# Patient Record
Sex: MALE | Race: BLACK OR AFRICAN AMERICAN | Employment: OTHER | URBAN - METROPOLITAN AREA
[De-identification: names, ages, dates, MRNs, and addresses within clinical notes are randomized per-mention and may not be internally consistent; named-entity substitution may affect disease eponyms.]

---

## 2017-12-05 ENCOUNTER — HOSPITAL ENCOUNTER (EMERGENCY)
Age: 77
Discharge: HOME | End: 2017-12-05
Attending: EMERGENCY MEDICINE
Payer: COMMERCIAL

## 2017-12-05 VITALS
RESPIRATION RATE: 16 BRPM | OXYGEN SATURATION: 98 % | HEIGHT: 69 IN | WEIGHT: 172 LBS | SYSTOLIC BLOOD PRESSURE: 157 MMHG | TEMPERATURE: 98 F | HEART RATE: 90 BPM | DIASTOLIC BLOOD PRESSURE: 75 MMHG | BODY MASS INDEX: 25.48 KG/M2
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
## 2017-12-05 DIAGNOSIS — S39.012A LOW BACK STRAIN, INITIAL ENCOUNTER: Primary | ICD-10-CM

## 2017-12-05 PROCEDURE — 99283 EMERGENCY DEPT VISIT LOW MDM: CPT

## 2017-12-05 RX ORDER — LISINOPRIL 20 MG/1
TABLET ORAL DAILY
Status: ON HOLD | COMMUNITY

## 2017-12-05 RX ORDER — ASPIRIN 81 MG/1
TABLET ORAL DAILY
Status: ON HOLD | COMMUNITY

## 2017-12-05 RX ORDER — METFORMIN HYDROCHLORIDE 1000 MG/1
1000 TABLET ORAL 2 TIMES DAILY WITH MEALS
Status: ON HOLD | COMMUNITY

## 2017-12-05 RX ORDER — SIMVASTATIN 20 MG/1
TABLET, FILM COATED ORAL
Status: ON HOLD | COMMUNITY
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
## 2017-12-05 NOTE — ED NOTES
Pt discharged home stable and ambulatory. Pain level at discharge 0  . Pt discharged with son at bedside  Reviewed discharged instructions with verbalized understanding.    Patient armband removed and shredded
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
## 2017-12-05 NOTE — LETTER
NOTIFICATION RETURN TO WORK / SCHOOL 
 
12/5/2017 11:38 AM 
 
Mr. Terence Kemp [address] [address] To Whom It May Concern: 
 
Terence Kemp is currently under the care of [facility] EMERGENCY DEPT. He will return to work/school on: 12/7/17 If there are questions or concerns please have the patient contact our office.  
 
 
 
Sincerely,

## 2017-12-05 NOTE — DISCHARGE INSTRUCTIONS
Back Strain: Care Instructions  Your Care Instructions    Back strain happens when you overstretch, or pull, a muscle in your back. You may hurt your back in an accident or when you exercise or lift something. Most back pain will get better with rest and time. You can take care of yourself at home to help your back heal.  Follow-up care is a key part of your treatment and safety. Be sure to make and go to all appointments, and call your doctor if you are having problems. It's also a good idea to know your test results and keep a list of the medicines you take. How can you care for yourself at home? · Try to stay as active as you can, but stop or reduce any activity that causes pain. · Put ice or a cold pack on the sore muscle for 10 to 20 minutes at a time to stop swelling. Try this every 1 to 2 hours for 3 days (when you are awake) or until the swelling goes down. Put a thin cloth between the ice pack and your skin. · After 2 or 3 days, apply a heating pad on low or a warm cloth to your back. Some doctors suggest that you go back and forth between hot and cold treatments. · Take pain medicines exactly as directed. ¨ If the doctor gave you a prescription medicine for pain, take it as prescribed. ¨ If you are not taking a prescription pain medicine, ask your doctor if you can take an over-the-counter medicine. · Try sleeping on your side with a pillow between your legs. Or put a pillow under your knees when you lie on your back. These measures can ease pain in your lower back. · Return to your usual level of activity slowly. When should you call for help? Call 911 anytime you think you may need emergency care. For example, call if:  ? · You are unable to move a leg at all. ?Call your doctor now or seek immediate medical care if:  ? · You have new or worse symptoms in your legs, belly, or buttocks. Symptoms may include:  ¨ Numbness or tingling. ¨ Weakness. ¨ Pain.    ? · You lose bladder or bowel control. ? Watch closely for changes in your health, and be sure to contact your doctor if you are not getting better as expected. Where can you learn more? Go to http://eric-lana.info/. Enter C815 in the search box to learn more about \"Back Strain: Care Instructions. \"  Current as of: March 21, 2017  Content Version: 11.4  © 3345-7075 [employer]. Care instructions adapted under license by [employer] (which disclaims liability or warranty for this information). If you have questions about a medical condition or this instruction, always ask your healthcare professional. [place] [address] any warranty or liability for your use of this information.

## 2022-01-01 ENCOUNTER — ANESTHESIA (OUTPATIENT)
Dept: ICU | Age: 82
DRG: 871 | End: 2022-01-01
Payer: MEDICARE

## 2022-01-01 ENCOUNTER — APPOINTMENT (OUTPATIENT)
Dept: GENERAL RADIOLOGY | Age: 82
DRG: 871 | End: 2022-01-01
Attending: INTERNAL MEDICINE
Payer: MEDICARE

## 2022-01-01 ENCOUNTER — APPOINTMENT (OUTPATIENT)
Dept: VASCULAR SURGERY | Age: 82
DRG: 871 | End: 2022-01-01
Attending: INTERNAL MEDICINE
Payer: MEDICARE

## 2022-01-01 ENCOUNTER — APPOINTMENT (OUTPATIENT)
Dept: CT IMAGING | Age: 82
DRG: 871 | End: 2022-01-01
Attending: INTERNAL MEDICINE
Payer: MEDICARE

## 2022-01-01 ENCOUNTER — APPOINTMENT (OUTPATIENT)
Dept: GENERAL RADIOLOGY | Age: 82
DRG: 871 | End: 2022-01-01
Attending: EMERGENCY MEDICINE
Payer: MEDICARE

## 2022-01-01 ENCOUNTER — APPOINTMENT (OUTPATIENT)
Dept: NUCLEAR MEDICINE | Age: 82
DRG: 871 | End: 2022-01-01
Attending: INTERNAL MEDICINE
Payer: MEDICARE

## 2022-01-01 ENCOUNTER — APPOINTMENT (OUTPATIENT)
Dept: NON INVASIVE DIAGNOSTICS | Age: 82
DRG: 871 | End: 2022-01-01
Attending: INTERNAL MEDICINE
Payer: MEDICARE

## 2022-01-01 ENCOUNTER — ANESTHESIA EVENT (OUTPATIENT)
Dept: ICU | Age: 82
DRG: 871 | End: 2022-01-01
Payer: MEDICARE

## 2022-01-01 ENCOUNTER — HOSPITAL ENCOUNTER (INPATIENT)
Age: 82
LOS: 5 days | DRG: 871 | End: 2022-11-07
Attending: EMERGENCY MEDICINE | Admitting: INTERNAL MEDICINE
Payer: MEDICARE

## 2022-01-01 ENCOUNTER — APPOINTMENT (OUTPATIENT)
Dept: GENERAL RADIOLOGY | Age: 82
DRG: 871 | End: 2022-01-01
Attending: FAMILY MEDICINE
Payer: MEDICARE

## 2022-01-01 VITALS
BODY MASS INDEX: 22.53 KG/M2 | WEIGHT: 143.52 LBS | SYSTOLIC BLOOD PRESSURE: 120 MMHG | TEMPERATURE: 97.3 F | OXYGEN SATURATION: 42 % | DIASTOLIC BLOOD PRESSURE: 72 MMHG | HEIGHT: 67 IN

## 2022-01-01 DIAGNOSIS — N17.9 AKI (ACUTE KIDNEY INJURY) (HCC): ICD-10-CM

## 2022-01-01 DIAGNOSIS — N17.9 SEPSIS WITH ACUTE RENAL FAILURE AND SEPTIC SHOCK, DUE TO UNSPECIFIED ORGANISM, UNSPECIFIED ACUTE RENAL FAILURE TYPE (HCC): ICD-10-CM

## 2022-01-01 DIAGNOSIS — U09.9 COVID-19 LONG HAULER: ICD-10-CM

## 2022-01-01 DIAGNOSIS — R09.02 HYPOXIA: Primary | ICD-10-CM

## 2022-01-01 DIAGNOSIS — E87.6 HYPOKALEMIA: ICD-10-CM

## 2022-01-01 DIAGNOSIS — I95.9 ACUTE HYPOTENSION: ICD-10-CM

## 2022-01-01 DIAGNOSIS — R65.21 SEPSIS WITH ACUTE RENAL FAILURE AND SEPTIC SHOCK, DUE TO UNSPECIFIED ORGANISM, UNSPECIFIED ACUTE RENAL FAILURE TYPE (HCC): ICD-10-CM

## 2022-01-01 DIAGNOSIS — A41.9 SEPSIS WITH ACUTE RENAL FAILURE AND SEPTIC SHOCK, DUE TO UNSPECIFIED ORGANISM, UNSPECIFIED ACUTE RENAL FAILURE TYPE (HCC): ICD-10-CM

## 2022-01-01 DIAGNOSIS — J18.9 PNEUMONIA OF BOTH LUNGS DUE TO INFECTIOUS ORGANISM, UNSPECIFIED PART OF LUNG: ICD-10-CM

## 2022-01-01 LAB
ABO + RH BLD: NORMAL
ALBUMIN SERPL-MCNC: 1.3 G/DL (ref 3.4–5)
ALBUMIN SERPL-MCNC: 1.5 G/DL (ref 3.4–5)
ALBUMIN SERPL-MCNC: 1.6 G/DL (ref 3.4–5)
ALBUMIN/GLOB SERPL: 0.3 {RATIO} (ref 0.8–1.7)
ALBUMIN/GLOB SERPL: 0.4 {RATIO} (ref 0.8–1.7)
ALP SERPL-CCNC: 131 U/L (ref 45–117)
ALP SERPL-CCNC: 152 U/L (ref 45–117)
ALP SERPL-CCNC: 163 U/L (ref 45–117)
ALP SERPL-CCNC: 171 U/L (ref 45–117)
ALP SERPL-CCNC: 175 U/L (ref 45–117)
ALP SERPL-CCNC: 250 U/L (ref 45–117)
ALT SERPL-CCNC: 32 U/L (ref 16–61)
ALT SERPL-CCNC: 35 U/L (ref 16–61)
ALT SERPL-CCNC: 36 U/L (ref 16–61)
ALT SERPL-CCNC: 37 U/L (ref 16–61)
ALT SERPL-CCNC: 46 U/L (ref 16–61)
ALT SERPL-CCNC: 51 U/L (ref 16–61)
ANION GAP SERPL CALC-SCNC: 11 MMOL/L (ref 3–18)
ANION GAP SERPL CALC-SCNC: 13 MMOL/L (ref 3–18)
ANION GAP SERPL CALC-SCNC: 15 MMOL/L (ref 3–18)
ANION GAP SERPL CALC-SCNC: 9 MMOL/L (ref 3–18)
APPEARANCE UR: CLEAR
ARTERIAL PATENCY WRIST A: POSITIVE
AST SERPL-CCNC: 151 U/L (ref 10–38)
AST SERPL-CCNC: 59 U/L (ref 10–38)
AST SERPL-CCNC: 60 U/L (ref 10–38)
AST SERPL-CCNC: 61 U/L (ref 10–38)
AST SERPL-CCNC: 74 U/L (ref 10–38)
AST SERPL-CCNC: 78 U/L (ref 10–38)
ATRIAL RATE: 92 BPM
BACTERIA SPEC CULT: NORMAL
BACTERIA SPEC CULT: NORMAL
BACTERIA URNS QL MICRO: ABNORMAL /HPF
BASE DEFICIT BLD-SCNC: 1.1 MMOL/L
BASE DEFICIT BLD-SCNC: 11.1 MMOL/L
BASE DEFICIT BLD-SCNC: 12.4 MMOL/L
BASE DEFICIT BLD-SCNC: 20.5 MMOL/L
BASOPHILS # BLD: 0 K/UL (ref 0–0.1)
BASOPHILS # BLD: 0.1 K/UL (ref 0–0.1)
BASOPHILS NFR BLD: 0 % (ref 0–2)
BASOPHILS NFR BLD: 1 % (ref 0–2)
BDY SITE: ABNORMAL
BILIRUB SERPL-MCNC: 0.6 MG/DL (ref 0.2–1)
BILIRUB SERPL-MCNC: 0.7 MG/DL (ref 0.2–1)
BILIRUB SERPL-MCNC: 0.9 MG/DL (ref 0.2–1)
BILIRUB SERPL-MCNC: 1.2 MG/DL (ref 0.2–1)
BILIRUB SERPL-MCNC: 1.6 MG/DL (ref 0.2–1)
BILIRUB SERPL-MCNC: 2.5 MG/DL (ref 0.2–1)
BILIRUB UR QL: NEGATIVE
BLD PROD TYP BPU: NORMAL
BLOOD GROUP ANTIBODIES SERPL: NORMAL
BODY TEMPERATURE: 96.4
BODY TEMPERATURE: 97.4
BODY TEMPERATURE: 98
BODY TEMPERATURE: 98.1
BPU ID: NORMAL
BUN SERPL-MCNC: 28 MG/DL (ref 7–18)
BUN SERPL-MCNC: 32 MG/DL (ref 7–18)
BUN SERPL-MCNC: 38 MG/DL (ref 7–18)
BUN SERPL-MCNC: 42 MG/DL (ref 7–18)
BUN SERPL-MCNC: 61 MG/DL (ref 7–18)
BUN SERPL-MCNC: 82 MG/DL (ref 7–18)
BUN/CREAT SERPL: 17 (ref 12–20)
BUN/CREAT SERPL: 19 (ref 12–20)
BUN/CREAT SERPL: 21 (ref 12–20)
BUN/CREAT SERPL: 22 (ref 12–20)
BUN/CREAT SERPL: 24 (ref 12–20)
BUN/CREAT SERPL: 24 (ref 12–20)
CA-I SERPL-SCNC: 1.04 MMOL/L (ref 1.12–1.32)
CA-I SERPL-SCNC: 1.05 MMOL/L (ref 1.12–1.32)
CA-I SERPL-SCNC: 1.08 MMOL/L (ref 1.12–1.32)
CA-I SERPL-SCNC: 1.17 MMOL/L (ref 1.12–1.32)
CA-I SERPL-SCNC: 1.18 MMOL/L (ref 1.12–1.32)
CA-I SERPL-SCNC: NORMAL MMOL/L (ref 1.12–1.32)
CALCIUM SERPL-MCNC: 7.1 MG/DL (ref 8.5–10.1)
CALCIUM SERPL-MCNC: 7.5 MG/DL (ref 8.5–10.1)
CALCIUM SERPL-MCNC: 7.6 MG/DL (ref 8.5–10.1)
CALCIUM SERPL-MCNC: 7.7 MG/DL (ref 8.5–10.1)
CALCIUM SERPL-MCNC: 7.7 MG/DL (ref 8.5–10.1)
CALCIUM SERPL-MCNC: 8 MG/DL (ref 8.5–10.1)
CALCULATED P AXIS, ECG09: 65 DEGREES
CALCULATED R AXIS, ECG10: 41 DEGREES
CALCULATED T AXIS, ECG11: 77 DEGREES
CALLED TO:,BCALL1: NORMAL
CHLORIDE SERPL-SCNC: 112 MMOL/L (ref 100–111)
CHLORIDE SERPL-SCNC: 116 MMOL/L (ref 100–111)
CHLORIDE SERPL-SCNC: 121 MMOL/L (ref 100–111)
CHLORIDE SERPL-SCNC: 122 MMOL/L (ref 100–111)
CHLORIDE SERPL-SCNC: 123 MMOL/L (ref 100–111)
CHLORIDE SERPL-SCNC: 124 MMOL/L (ref 100–111)
CO2 SERPL-SCNC: 12 MMOL/L (ref 21–32)
CO2 SERPL-SCNC: 13 MMOL/L (ref 21–32)
CO2 SERPL-SCNC: 13 MMOL/L (ref 21–32)
CO2 SERPL-SCNC: 15 MMOL/L (ref 21–32)
CO2 SERPL-SCNC: 23 MMOL/L (ref 21–32)
CO2 SERPL-SCNC: 8 MMOL/L (ref 21–32)
COLOR UR: YELLOW
CREAT SERPL-MCNC: 1.63 MG/DL (ref 0.6–1.3)
CREAT SERPL-MCNC: 1.67 MG/DL (ref 0.6–1.3)
CREAT SERPL-MCNC: 1.79 MG/DL (ref 0.6–1.3)
CREAT SERPL-MCNC: 1.95 MG/DL (ref 0.6–1.3)
CREAT SERPL-MCNC: 2.58 MG/DL (ref 0.6–1.3)
CREAT SERPL-MCNC: 3.42 MG/DL (ref 0.6–1.3)
CROSSMATCH RESULT,%XM: NORMAL
D DIMER PPP FEU-MCNC: 14.9 UG/ML(FEU)
DIAGNOSIS, 93000: NORMAL
DIFFERENTIAL METHOD BLD: ABNORMAL
ECHO AO ROOT DIAM: 2.8 CM
ECHO AO ROOT INDEX: 1.64 CM/M2
ECHO AV AREA PEAK VELOCITY: 2.6 CM2
ECHO AV AREA VTI: 2.8 CM2
ECHO AV AREA/BSA PEAK VELOCITY: 1.5 CM2/M2
ECHO AV AREA/BSA VTI: 1.6 CM2/M2
ECHO AV MEAN GRADIENT: 5 MMHG
ECHO AV MEAN VELOCITY: 1.1 M/S
ECHO AV PEAK GRADIENT: 7 MMHG
ECHO AV PEAK VELOCITY: 1.3 M/S
ECHO AV VELOCITY RATIO: 0.92
ECHO AV VTI: 23 CM
ECHO LA VOL 4C: 26 ML (ref 18–58)
ECHO LA VOLUME INDEX A4C: 15 ML/M2 (ref 16–34)
ECHO LV E' LATERAL VELOCITY: 8 CM/S
ECHO LV E' SEPTAL VELOCITY: 6 CM/S
ECHO LV EDV A2C: 26 ML
ECHO LV EDV A4C: 30 ML
ECHO LV EDV BP: 29 ML (ref 67–155)
ECHO LV EDV INDEX A4C: 18 ML/M2
ECHO LV EDV INDEX BP: 17 ML/M2
ECHO LV EDV NDEX A2C: 15 ML/M2
ECHO LV EJECTION FRACTION A2C: 62 %
ECHO LV EJECTION FRACTION A4C: 65 %
ECHO LV EJECTION FRACTION BIPLANE: 65 % (ref 55–100)
ECHO LV ESV A2C: 10 ML
ECHO LV ESV A4C: 10 ML
ECHO LV ESV BP: 10 ML (ref 22–58)
ECHO LV ESV INDEX A2C: 6 ML/M2
ECHO LV ESV INDEX A4C: 6 ML/M2
ECHO LV ESV INDEX BP: 6 ML/M2
ECHO LV FRACTIONAL SHORTENING: 28 % (ref 28–44)
ECHO LV INTERNAL DIMENSION DIASTOLE INDEX: 2.28 CM/M2
ECHO LV INTERNAL DIMENSION DIASTOLIC: 3.9 CM (ref 4.2–5.9)
ECHO LV INTERNAL DIMENSION SYSTOLIC INDEX: 1.64 CM/M2
ECHO LV INTERNAL DIMENSION SYSTOLIC: 2.8 CM
ECHO LV IVSD: 0.9 CM (ref 0.6–1)
ECHO LV MASS 2D: 113.6 G (ref 88–224)
ECHO LV MASS INDEX 2D: 66.4 G/M2 (ref 49–115)
ECHO LV POSTERIOR WALL DIASTOLIC: 1 CM (ref 0.6–1)
ECHO LV RELATIVE WALL THICKNESS RATIO: 0.51
ECHO LVOT AREA: 2.8 CM2
ECHO LVOT AV VTI INDEX: 1.01
ECHO LVOT DIAM: 1.9 CM
ECHO LVOT MEAN GRADIENT: 4 MMHG
ECHO LVOT PEAK GRADIENT: 6 MMHG
ECHO LVOT PEAK VELOCITY: 1.2 M/S
ECHO LVOT STROKE VOLUME INDEX: 38.4 ML/M2
ECHO LVOT SV: 65.7 ML
ECHO LVOT VTI: 23.2 CM
ECHO MV A VELOCITY: 0.94 M/S
ECHO MV E DECELERATION TIME (DT): 177.5 MS
ECHO MV E VELOCITY: 0.57 M/S
ECHO MV E/A RATIO: 0.61
ECHO MV E/E' LATERAL: 7.13
ECHO MV E/E' RATIO (AVERAGED): 8.31
ECHO MV E/E' SEPTAL: 9.5
ECHO PV ACCELERATION TIME (AT): 91.3 MS
ECHO PV MAX VELOCITY: 0.9 M/S
ECHO PV PEAK GRADIENT: 3 MMHG
ECHO RV TAPSE: 1.6 CM (ref 1.7–?)
EOSINOPHIL # BLD: 0 K/UL (ref 0–0.4)
EOSINOPHIL # BLD: 0.1 K/UL (ref 0–0.4)
EOSINOPHIL NFR BLD: 0 % (ref 0–5)
EOSINOPHIL NFR BLD: 1 % (ref 0–5)
EPITH CASTS URNS QL MICRO: ABNORMAL /LPF (ref 0–5)
ERYTHROCYTE [DISTWIDTH] IN BLOOD BY AUTOMATED COUNT: 19.9 % (ref 11.6–14.5)
ERYTHROCYTE [DISTWIDTH] IN BLOOD BY AUTOMATED COUNT: 20.7 % (ref 11.6–14.5)
ERYTHROCYTE [DISTWIDTH] IN BLOOD BY AUTOMATED COUNT: 20.7 % (ref 11.6–14.5)
ERYTHROCYTE [DISTWIDTH] IN BLOOD BY AUTOMATED COUNT: 20.9 % (ref 11.6–14.5)
ERYTHROCYTE [DISTWIDTH] IN BLOOD BY AUTOMATED COUNT: 26.1 % (ref 11.6–14.5)
ERYTHROCYTE [DISTWIDTH] IN BLOOD BY AUTOMATED COUNT: 27 % (ref 11.6–14.5)
EST. AVERAGE GLUCOSE BLD GHB EST-MCNC: 171 MG/DL
EST. AVERAGE GLUCOSE BLD GHB EST-MCNC: 177 MG/DL
FERRITIN SERPL-MCNC: 3753 NG/ML (ref 8–388)
FIBRINOGEN PPP-MCNC: 757 MG/DL (ref 210–451)
FLUAV RNA SPEC QL NAA+PROBE: NOT DETECTED
FLUBV RNA SPEC QL NAA+PROBE: NOT DETECTED
FOLATE SERPL-MCNC: 7.1 NG/ML (ref 3.1–17.5)
GAS FLOW.O2 O2 DELIVERY SYS: ABNORMAL L/MIN
GAS FLOW.O2 SETTING OXYMISER: 24 BPM
GAS FLOW.O2 SETTING OXYMISER: 24 BPM
GAS FLOW.O2 SETTING OXYMISER: 38 BPM
GLOBULIN SER CALC-MCNC: 3.8 G/DL (ref 2–4)
GLOBULIN SER CALC-MCNC: 4.2 G/DL (ref 2–4)
GLOBULIN SER CALC-MCNC: 4.2 G/DL (ref 2–4)
GLOBULIN SER CALC-MCNC: 4.5 G/DL (ref 2–4)
GLOBULIN SER CALC-MCNC: 4.5 G/DL (ref 2–4)
GLOBULIN SER CALC-MCNC: 5.3 G/DL (ref 2–4)
GLUCOSE BLD STRIP.AUTO-MCNC: 104 MG/DL (ref 70–110)
GLUCOSE BLD STRIP.AUTO-MCNC: 114 MG/DL (ref 70–110)
GLUCOSE BLD STRIP.AUTO-MCNC: 122 MG/DL (ref 70–110)
GLUCOSE BLD STRIP.AUTO-MCNC: 131 MG/DL (ref 70–110)
GLUCOSE BLD STRIP.AUTO-MCNC: 145 MG/DL (ref 70–110)
GLUCOSE BLD STRIP.AUTO-MCNC: 147 MG/DL (ref 70–110)
GLUCOSE BLD STRIP.AUTO-MCNC: 158 MG/DL (ref 70–110)
GLUCOSE BLD STRIP.AUTO-MCNC: 160 MG/DL (ref 70–110)
GLUCOSE BLD STRIP.AUTO-MCNC: 161 MG/DL (ref 70–110)
GLUCOSE BLD STRIP.AUTO-MCNC: 164 MG/DL (ref 70–110)
GLUCOSE BLD STRIP.AUTO-MCNC: 165 MG/DL (ref 70–110)
GLUCOSE BLD STRIP.AUTO-MCNC: 178 MG/DL (ref 70–110)
GLUCOSE BLD STRIP.AUTO-MCNC: 178 MG/DL (ref 70–110)
GLUCOSE BLD STRIP.AUTO-MCNC: 185 MG/DL (ref 70–110)
GLUCOSE BLD STRIP.AUTO-MCNC: 190 MG/DL (ref 70–110)
GLUCOSE BLD STRIP.AUTO-MCNC: 190 MG/DL (ref 70–110)
GLUCOSE BLD STRIP.AUTO-MCNC: 195 MG/DL (ref 70–110)
GLUCOSE BLD STRIP.AUTO-MCNC: 212 MG/DL (ref 70–110)
GLUCOSE BLD STRIP.AUTO-MCNC: 220 MG/DL (ref 70–110)
GLUCOSE BLD STRIP.AUTO-MCNC: 229 MG/DL (ref 70–110)
GLUCOSE BLD STRIP.AUTO-MCNC: 232 MG/DL (ref 70–110)
GLUCOSE BLD STRIP.AUTO-MCNC: 233 MG/DL (ref 70–110)
GLUCOSE BLD STRIP.AUTO-MCNC: 241 MG/DL (ref 70–110)
GLUCOSE BLD STRIP.AUTO-MCNC: 244 MG/DL (ref 70–110)
GLUCOSE BLD STRIP.AUTO-MCNC: 251 MG/DL (ref 70–110)
GLUCOSE BLD STRIP.AUTO-MCNC: 256 MG/DL (ref 70–110)
GLUCOSE BLD STRIP.AUTO-MCNC: 310 MG/DL (ref 70–110)
GLUCOSE BLD STRIP.AUTO-MCNC: 314 MG/DL (ref 70–110)
GLUCOSE BLD STRIP.AUTO-MCNC: 316 MG/DL (ref 70–110)
GLUCOSE BLD STRIP.AUTO-MCNC: 332 MG/DL (ref 70–110)
GLUCOSE BLD STRIP.AUTO-MCNC: 356 MG/DL (ref 70–110)
GLUCOSE BLD STRIP.AUTO-MCNC: 368 MG/DL (ref 70–110)
GLUCOSE BLD STRIP.AUTO-MCNC: 401 MG/DL (ref 70–110)
GLUCOSE BLD STRIP.AUTO-MCNC: 56 MG/DL (ref 70–110)
GLUCOSE BLD STRIP.AUTO-MCNC: 59 MG/DL (ref 70–110)
GLUCOSE BLD STRIP.AUTO-MCNC: 60 MG/DL (ref 70–110)
GLUCOSE BLD STRIP.AUTO-MCNC: 64 MG/DL (ref 70–110)
GLUCOSE BLD STRIP.AUTO-MCNC: 65 MG/DL (ref 70–110)
GLUCOSE BLD STRIP.AUTO-MCNC: 78 MG/DL (ref 70–110)
GLUCOSE BLD STRIP.AUTO-MCNC: 87 MG/DL (ref 70–110)
GLUCOSE BLD STRIP.AUTO-MCNC: 97 MG/DL (ref 70–110)
GLUCOSE SERPL-MCNC: 140 MG/DL (ref 74–99)
GLUCOSE SERPL-MCNC: 194 MG/DL (ref 74–99)
GLUCOSE SERPL-MCNC: 262 MG/DL (ref 74–99)
GLUCOSE SERPL-MCNC: 300 MG/DL (ref 74–99)
GLUCOSE SERPL-MCNC: 73 MG/DL (ref 74–99)
GLUCOSE SERPL-MCNC: 83 MG/DL (ref 74–99)
GLUCOSE UR STRIP.AUTO-MCNC: 250 MG/DL
GRAM STN SPEC: NORMAL
GRAM STN SPEC: NORMAL
GRAN CASTS URNS QL MICRO: ABNORMAL /LPF
HAPTOGLOB SERPL-MCNC: 301 MG/DL (ref 30–200)
HBA1C MFR BLD: 7.6 % (ref 4.2–5.6)
HBA1C MFR BLD: 7.8 % (ref 4.2–5.6)
HCO3 BLD-SCNC: 12.2 MMOL/L (ref 22–26)
HCO3 BLD-SCNC: 14.3 MMOL/L (ref 22–26)
HCO3 BLD-SCNC: 22 MMOL/L (ref 22–26)
HCO3 BLD-SCNC: 5.7 MMOL/L (ref 22–26)
HCT VFR BLD AUTO: 19.1 % (ref 36–48)
HCT VFR BLD AUTO: 19.3 % (ref 36–48)
HCT VFR BLD AUTO: 19.8 % (ref 36–48)
HCT VFR BLD AUTO: 23 % (ref 36–48)
HCT VFR BLD AUTO: 23.8 % (ref 36–48)
HCT VFR BLD AUTO: 24.3 % (ref 36–48)
HCT VFR BLD AUTO: 24.5 % (ref 36–48)
HCT VFR BLD AUTO: 25.7 % (ref 36–48)
HEMOCCULT STL QL: POSITIVE
HGB BLD-MCNC: 6.5 G/DL (ref 13–16)
HGB BLD-MCNC: 6.6 G/DL (ref 13–16)
HGB BLD-MCNC: 6.7 G/DL (ref 13–16)
HGB BLD-MCNC: 7.8 G/DL (ref 13–16)
HGB BLD-MCNC: 8.2 G/DL (ref 13–16)
HGB BLD-MCNC: 8.3 G/DL (ref 13–16)
HGB BLD-MCNC: 8.4 G/DL (ref 13–16)
HGB BLD-MCNC: 8.5 G/DL (ref 13–16)
HGB UR QL STRIP: ABNORMAL
HISTORY CHECKED?,CKHIST: NORMAL
HISTORY CHECKED?,CKHIST: NORMAL
IMM GRANULOCYTES # BLD AUTO: 0 K/UL
IMM GRANULOCYTES # BLD AUTO: 0 K/UL (ref 0–0.04)
IMM GRANULOCYTES # BLD AUTO: 0.1 K/UL (ref 0–0.04)
IMM GRANULOCYTES # BLD AUTO: 0.1 K/UL (ref 0–0.04)
IMM GRANULOCYTES # BLD AUTO: 0.2 K/UL (ref 0–0.04)
IMM GRANULOCYTES # BLD AUTO: 0.5 K/UL (ref 0–0.04)
IMM GRANULOCYTES NFR BLD AUTO: 0 %
IMM GRANULOCYTES NFR BLD AUTO: 0 % (ref 0–0.5)
IMM GRANULOCYTES NFR BLD AUTO: 2 % (ref 0–0.5)
IMM GRANULOCYTES NFR BLD AUTO: 4 % (ref 0–0.5)
IRON SATN MFR SERPL: 22 % (ref 20–50)
IRON SERPL-MCNC: 26 UG/DL (ref 50–175)
KETONES UR QL STRIP.AUTO: NEGATIVE MG/DL
L PNEUMO AG UR QL IA: NEGATIVE
LACTATE BLD-SCNC: 1.49 MMOL/L (ref 0.4–2)
LACTATE BLD-SCNC: 4.08 MMOL/L (ref 0.4–2)
LACTATE SERPL-SCNC: 3.1 MMOL/L (ref 0.4–2)
LACTATE SERPL-SCNC: 3.1 MMOL/L (ref 0.4–2)
LACTATE SERPL-SCNC: 3.5 MMOL/L (ref 0.4–2)
LACTATE SERPL-SCNC: 4 MMOL/L (ref 0.4–2)
LACTATE SERPL-SCNC: 6.2 MMOL/L (ref 0.4–2)
LACTATE SERPL-SCNC: 6.4 MMOL/L (ref 0.4–2)
LDH SERPL L TO P-CCNC: 401 U/L (ref 81–234)
LDH SERPL L TO P-CCNC: 479 U/L (ref 81–234)
LEUKOCYTE ESTERASE UR QL STRIP.AUTO: NEGATIVE
LYMPHOCYTES # BLD: 0.1 K/UL (ref 0.9–3.6)
LYMPHOCYTES # BLD: 0.3 K/UL (ref 0.9–3.6)
LYMPHOCYTES # BLD: 0.4 K/UL (ref 0.9–3.6)
LYMPHOCYTES # BLD: 0.4 K/UL (ref 0.9–3.6)
LYMPHOCYTES # BLD: 0.5 K/UL (ref 0.9–3.6)
LYMPHOCYTES # BLD: 0.6 K/UL (ref 0.9–3.6)
LYMPHOCYTES NFR BLD: 1 % (ref 21–52)
LYMPHOCYTES NFR BLD: 10 % (ref 21–52)
LYMPHOCYTES NFR BLD: 12 % (ref 21–52)
LYMPHOCYTES NFR BLD: 4 % (ref 21–52)
LYMPHOCYTES NFR BLD: 4 % (ref 21–52)
LYMPHOCYTES NFR BLD: 5 % (ref 21–52)
MAGNESIUM SERPL-MCNC: 1.6 MG/DL (ref 1.6–2.6)
MAGNESIUM SERPL-MCNC: 1.8 MG/DL (ref 1.6–2.6)
MAGNESIUM SERPL-MCNC: 1.8 MG/DL (ref 1.6–2.6)
MAGNESIUM SERPL-MCNC: 2 MG/DL (ref 1.6–2.6)
MAGNESIUM SERPL-MCNC: 2.1 MG/DL (ref 1.6–2.6)
MAGNESIUM SERPL-MCNC: 2.2 MG/DL (ref 1.6–2.6)
MAGNESIUM SERPL-MCNC: 2.2 MG/DL (ref 1.6–2.6)
MAGNESIUM SERPL-MCNC: 2.4 MG/DL (ref 1.6–2.6)
MCH RBC QN AUTO: 28.6 PG (ref 24–34)
MCH RBC QN AUTO: 29 PG (ref 24–34)
MCH RBC QN AUTO: 30.1 PG (ref 24–34)
MCH RBC QN AUTO: 30.1 PG (ref 24–34)
MCH RBC QN AUTO: 30.4 PG (ref 24–34)
MCH RBC QN AUTO: 30.4 PG (ref 24–34)
MCHC RBC AUTO-ENTMCNC: 33.1 G/DL (ref 31–37)
MCHC RBC AUTO-ENTMCNC: 33.3 G/DL (ref 31–37)
MCHC RBC AUTO-ENTMCNC: 33.9 G/DL (ref 31–37)
MCHC RBC AUTO-ENTMCNC: 34.2 G/DL (ref 31–37)
MCHC RBC AUTO-ENTMCNC: 34.5 G/DL (ref 31–37)
MCHC RBC AUTO-ENTMCNC: 34.7 G/DL (ref 31–37)
MCV RBC AUTO: 82.5 FL (ref 78–100)
MCV RBC AUTO: 87.5 FL (ref 78–100)
MCV RBC AUTO: 87.7 FL (ref 78–100)
MCV RBC AUTO: 89 FL (ref 78–100)
MCV RBC AUTO: 89.5 FL (ref 78–100)
MCV RBC AUTO: 90.4 FL (ref 78–100)
METAMYELOCYTES NFR BLD MANUAL: 12 %
METAMYELOCYTES NFR BLD MANUAL: 2 %
MIXED CELL CASTS URNS QL MICRO: ABNORMAL /LPF
MONOCYTES # BLD: 0.3 K/UL (ref 0.05–1.2)
MONOCYTES # BLD: 0.4 K/UL (ref 0.05–1.2)
MONOCYTES # BLD: 0.4 K/UL (ref 0.05–1.2)
MONOCYTES # BLD: 0.6 K/UL (ref 0.05–1.2)
MONOCYTES # BLD: 0.6 K/UL (ref 0.05–1.2)
MONOCYTES # BLD: 1.4 K/UL (ref 0.05–1.2)
MONOCYTES NFR BLD: 11 % (ref 3–10)
MONOCYTES NFR BLD: 3 % (ref 3–10)
MONOCYTES NFR BLD: 7 % (ref 3–10)
MONOCYTES NFR BLD: 7 % (ref 3–10)
MONOCYTES NFR BLD: 8 % (ref 3–10)
MONOCYTES NFR BLD: 9 % (ref 3–10)
NEUTS BAND NFR BLD MANUAL: 11 %
NEUTS BAND NFR BLD MANUAL: 4 % (ref 0–5)
NEUTS SEG # BLD: 10.9 K/UL (ref 1.8–8)
NEUTS SEG # BLD: 12.5 K/UL (ref 1.8–8)
NEUTS SEG # BLD: 2.6 K/UL (ref 1.8–8)
NEUTS SEG # BLD: 3.8 K/UL (ref 1.8–8)
NEUTS SEG # BLD: 5.6 K/UL (ref 1.8–8)
NEUTS SEG # BLD: 7.5 K/UL (ref 1.8–8)
NEUTS SEG NFR BLD: 58 % (ref 40–73)
NEUTS SEG NFR BLD: 77 % (ref 40–73)
NEUTS SEG NFR BLD: 83 % (ref 40–73)
NEUTS SEG NFR BLD: 83 % (ref 40–73)
NEUTS SEG NFR BLD: 86 % (ref 40–73)
NEUTS SEG NFR BLD: 87 % (ref 40–73)
NITRITE UR QL STRIP.AUTO: NEGATIVE
NRBC # BLD: 0.11 K/UL (ref 0–0.01)
NRBC # BLD: 0.12 K/UL (ref 0–0.01)
NRBC # BLD: 0.17 K/UL (ref 0–0.01)
NRBC # BLD: 0.27 K/UL (ref 0–0.01)
NRBC # BLD: 1.5 K/UL (ref 0–0.01)
NRBC # BLD: 5.62 K/UL (ref 0–0.01)
NRBC BLD-RTO: 11.6 PER 100 WBC
NRBC BLD-RTO: 2.4 PER 100 WBC
NRBC BLD-RTO: 2.5 PER 100 WBC
NRBC BLD-RTO: 3 PER 100 WBC
NRBC BLD-RTO: 3.1 PER 100 WBC
NRBC BLD-RTO: 41 PER 100 WBC
O2/TOTAL GAS SETTING VFR VENT: 100 %
O2/TOTAL GAS SETTING VFR VENT: 100 %
O2/TOTAL GAS SETTING VFR VENT: 2 %
O2/TOTAL GAS SETTING VFR VENT: 75 %
P-R INTERVAL, ECG05: 130 MS
PCO2 BLD: 15 MMHG (ref 35–45)
PCO2 BLD: 21.8 MMHG (ref 35–45)
PCO2 BLD: 28 MMHG (ref 35–45)
PCO2 BLD: 28 MMHG (ref 35–45)
PEEP RESPIRATORY: 5 CMH2O
PEEP RESPIRATORY: 5 CMH2O
PERIPHERAL SMEAR,PSM: NORMAL
PH BLD: 7.19 [PH] (ref 7.35–7.45)
PH BLD: 7.31 [PH] (ref 7.35–7.45)
PH BLD: 7.35 [PH] (ref 7.35–7.45)
PH BLD: 7.5 [PH] (ref 7.35–7.45)
PH UR STRIP: 5.5 [PH] (ref 5–8)
PHOSPHATE SERPL-MCNC: 1.5 MG/DL (ref 2.5–4.9)
PHOSPHATE SERPL-MCNC: 2.8 MG/DL (ref 2.5–4.9)
PHOSPHATE SERPL-MCNC: 2.8 MG/DL (ref 2.5–4.9)
PHOSPHATE SERPL-MCNC: 4.8 MG/DL (ref 2.5–4.9)
PHOSPHATE SERPL-MCNC: 5.4 MG/DL (ref 2.5–4.9)
PIP ISTAT,IPIP: 20
PIP ISTAT,IPIP: 22
PLATELET # BLD AUTO: 48 K/UL (ref 135–420)
PLATELET # BLD AUTO: 49 K/UL (ref 135–420)
PLATELET # BLD AUTO: 55 K/UL (ref 135–420)
PLATELET # BLD AUTO: 59 K/UL (ref 135–420)
PLATELET # BLD AUTO: 60 K/UL (ref 135–420)
PLATELET # BLD AUTO: 65 K/UL (ref 135–420)
PLATELET COMMENTS,PCOM: ABNORMAL
PO2 BLD: 199 MMHG (ref 80–100)
PO2 BLD: 72 MMHG (ref 80–100)
PO2 BLD: 83 MMHG (ref 80–100)
PO2 BLD: 87 MMHG (ref 80–100)
POTASSIUM SERPL-SCNC: 3.2 MMOL/L (ref 3.5–5.5)
POTASSIUM SERPL-SCNC: 4 MMOL/L (ref 3.5–5.5)
POTASSIUM SERPL-SCNC: 4.5 MMOL/L (ref 3.5–5.5)
POTASSIUM SERPL-SCNC: 4.6 MMOL/L (ref 3.5–5.5)
POTASSIUM SERPL-SCNC: 4.7 MMOL/L (ref 3.5–5.5)
POTASSIUM SERPL-SCNC: 5.3 MMOL/L (ref 3.5–5.5)
POTASSIUM SERPL-SCNC: 5.6 MMOL/L (ref 3.5–5.5)
PROCALCITONIN SERPL-MCNC: 1.6 NG/ML
PROCALCITONIN SERPL-MCNC: 2.65 NG/ML
PROMYELOCYTES NFR BLD MANUAL: 2 %
PROT SERPL-MCNC: 5.4 G/DL (ref 6.4–8.2)
PROT SERPL-MCNC: 5.5 G/DL (ref 6.4–8.2)
PROT SERPL-MCNC: 5.5 G/DL (ref 6.4–8.2)
PROT SERPL-MCNC: 5.8 G/DL (ref 6.4–8.2)
PROT SERPL-MCNC: 5.8 G/DL (ref 6.4–8.2)
PROT SERPL-MCNC: 6.8 G/DL (ref 6.4–8.2)
PROT UR STRIP-MCNC: 100 MG/DL
Q-T INTERVAL, ECG07: 396 MS
QRS DURATION, ECG06: 94 MS
QTC CALCULATION (BEZET), ECG08: 489 MS
RBC # BLD AUTO: 2.19 M/UL (ref 4.35–5.65)
RBC # BLD AUTO: 2.34 M/UL (ref 4.35–5.65)
RBC # BLD AUTO: 2.57 M/UL (ref 4.35–5.65)
RBC # BLD AUTO: 2.72 M/UL (ref 4.35–5.65)
RBC # BLD AUTO: 2.73 M/UL (ref 4.35–5.65)
RBC # BLD AUTO: 2.93 M/UL (ref 4.35–5.65)
RBC #/AREA URNS HPF: ABNORMAL /HPF (ref 0–5)
RBC MORPH BLD: ABNORMAL
RETICS/RBC NFR AUTO: 1.3 % (ref 0.5–2.5)
S PNEUM AG UR QL: NEGATIVE
SAO2 % BLD: 93.9 % (ref 92–97)
SAO2 % BLD: 95 % (ref 92–97)
SAO2 % BLD: 96.2 % (ref 92–97)
SAO2 % BLD: 99.8 % (ref 92–97)
SARS-COV-2, COV2: DETECTED
SERVICE CMNT-IMP: ABNORMAL
SERVICE CMNT-IMP: NORMAL
SERVICE CMNT-IMP: NORMAL
SODIUM SERPL-SCNC: 142 MMOL/L (ref 136–145)
SODIUM SERPL-SCNC: 144 MMOL/L (ref 136–145)
SODIUM SERPL-SCNC: 144 MMOL/L (ref 136–145)
SODIUM SERPL-SCNC: 146 MMOL/L (ref 136–145)
SP GR UR REFRACTOMETRY: 1.02 (ref 1–1.03)
SPECIMEN EXP DATE BLD: NORMAL
SPECIMEN TYPE: ABNORMAL
STATUS OF UNIT,%ST: NORMAL
TIBC SERPL-MCNC: 116 UG/DL (ref 250–450)
TROPONIN-HIGH SENSITIVITY: 21 NG/L (ref 0–78)
TROPONIN-HIGH SENSITIVITY: 24 NG/L (ref 0–78)
UNIT DIVISION, %UDIV: 0
UROBILINOGEN UR QL STRIP.AUTO: 0.2 EU/DL (ref 0.2–1)
VANCOMYCIN SERPL-MCNC: 14.7 UG/ML (ref 5–40)
VENTILATION MODE VENT: ABNORMAL
VENTILATION MODE VENT: ABNORMAL
VENTRICULAR RATE, ECG03: 92 BPM
VIT B12 SERPL-MCNC: >2000 PG/ML (ref 211–911)
VT SETTING VENT: 450 ML
VT SETTING VENT: 450 ML
WBC # BLD AUTO: 13 K/UL (ref 4.6–13.2)
WBC # BLD AUTO: 13.7 K/UL (ref 4.6–13.2)
WBC # BLD AUTO: 3.7 K/UL (ref 4.6–13.2)
WBC # BLD AUTO: 5 K/UL (ref 4.6–13.2)
WBC # BLD AUTO: 6.7 K/UL (ref 4.6–13.2)
WBC # BLD AUTO: 8.8 K/UL (ref 4.6–13.2)
WBC MORPH BLD: ABNORMAL
WBC URNS QL MICRO: ABNORMAL /HPF (ref 0–5)

## 2022-01-01 PROCEDURE — 74011250636 HC RX REV CODE- 250/636: Performed by: INTERNAL MEDICINE

## 2022-01-01 PROCEDURE — 84145 PROCALCITONIN (PCT): CPT

## 2022-01-01 PROCEDURE — 87449 NOS EACH ORGANISM AG IA: CPT

## 2022-01-01 PROCEDURE — 85025 COMPLETE CBC W/AUTO DIFF WBC: CPT

## 2022-01-01 PROCEDURE — 82962 GLUCOSE BLOOD TEST: CPT

## 2022-01-01 PROCEDURE — 87040 BLOOD CULTURE FOR BACTERIA: CPT

## 2022-01-01 PROCEDURE — 77010033711 HC HIGH FLOW OXYGEN

## 2022-01-01 PROCEDURE — 36415 COLL VENOUS BLD VENIPUNCTURE: CPT

## 2022-01-01 PROCEDURE — 74018 RADEX ABDOMEN 1 VIEW: CPT

## 2022-01-01 PROCEDURE — 71045 X-RAY EXAM CHEST 1 VIEW: CPT

## 2022-01-01 PROCEDURE — 74011000250 HC RX REV CODE- 250: Performed by: INTERNAL MEDICINE

## 2022-01-01 PROCEDURE — 84132 ASSAY OF SERUM POTASSIUM: CPT

## 2022-01-01 PROCEDURE — 0BH17EZ INSERTION OF ENDOTRACHEAL AIRWAY INTO TRACHEA, VIA NATURAL OR ARTIFICIAL OPENING: ICD-10-PCS | Performed by: SPECIALIST

## 2022-01-01 PROCEDURE — 36600 WITHDRAWAL OF ARTERIAL BLOOD: CPT

## 2022-01-01 PROCEDURE — 74011000250 HC RX REV CODE- 250: Performed by: FAMILY MEDICINE

## 2022-01-01 PROCEDURE — 80053 COMPREHEN METABOLIC PANEL: CPT

## 2022-01-01 PROCEDURE — 82330 ASSAY OF CALCIUM: CPT

## 2022-01-01 PROCEDURE — 83735 ASSAY OF MAGNESIUM: CPT

## 2022-01-01 PROCEDURE — 74011000258 HC RX REV CODE- 258: Performed by: EMERGENCY MEDICINE

## 2022-01-01 PROCEDURE — 74011250636 HC RX REV CODE- 250/636: Performed by: EMERGENCY MEDICINE

## 2022-01-01 PROCEDURE — 82272 OCCULT BLD FECES 1-3 TESTS: CPT

## 2022-01-01 PROCEDURE — 74011636637 HC RX REV CODE- 636/637: Performed by: INTERNAL MEDICINE

## 2022-01-01 PROCEDURE — 87070 CULTURE OTHR SPECIMN AEROBIC: CPT

## 2022-01-01 PROCEDURE — 36430 TRANSFUSION BLD/BLD COMPNT: CPT

## 2022-01-01 PROCEDURE — 99285 EMERGENCY DEPT VISIT HI MDM: CPT

## 2022-01-01 PROCEDURE — 77010033678 HC OXYGEN DAILY

## 2022-01-01 PROCEDURE — 93005 ELECTROCARDIOGRAM TRACING: CPT

## 2022-01-01 PROCEDURE — 84484 ASSAY OF TROPONIN QUANT: CPT

## 2022-01-01 PROCEDURE — 94003 VENT MGMT INPAT SUBQ DAY: CPT

## 2022-01-01 PROCEDURE — 74011250637 HC RX REV CODE- 250/637: Performed by: HOSPITALIST

## 2022-01-01 PROCEDURE — 74011000258 HC RX REV CODE- 258: Performed by: FAMILY MEDICINE

## 2022-01-01 PROCEDURE — 83615 LACTATE (LD) (LDH) ENZYME: CPT

## 2022-01-01 PROCEDURE — 99356 PR PROLONGED SVC I/P OR OBS SETTING 1ST HOUR: CPT | Performed by: NURSE PRACTITIONER

## 2022-01-01 PROCEDURE — 86923 COMPATIBILITY TEST ELECTRIC: CPT

## 2022-01-01 PROCEDURE — 96361 HYDRATE IV INFUSION ADD-ON: CPT

## 2022-01-01 PROCEDURE — 83605 ASSAY OF LACTIC ACID: CPT

## 2022-01-01 PROCEDURE — C9113 INJ PANTOPRAZOLE SODIUM, VIA: HCPCS | Performed by: INTERNAL MEDICINE

## 2022-01-01 PROCEDURE — 81001 URINALYSIS AUTO W/SCOPE: CPT

## 2022-01-01 PROCEDURE — 75810000455 HC PLCMT CENT VENOUS CATH LVL 2 5182

## 2022-01-01 PROCEDURE — 83540 ASSAY OF IRON: CPT

## 2022-01-01 PROCEDURE — 5A1945Z RESPIRATORY VENTILATION, 24-96 CONSECUTIVE HOURS: ICD-10-PCS | Performed by: INTERNAL MEDICINE

## 2022-01-01 PROCEDURE — C9399 UNCLASSIFIED DRUGS OR BIOLOG: HCPCS | Performed by: INTERNAL MEDICINE

## 2022-01-01 PROCEDURE — 74011250636 HC RX REV CODE- 250/636: Performed by: PHYSICIAN ASSISTANT

## 2022-01-01 PROCEDURE — 94002 VENT MGMT INPAT INIT DAY: CPT

## 2022-01-01 PROCEDURE — 84100 ASSAY OF PHOSPHORUS: CPT

## 2022-01-01 PROCEDURE — 99223 1ST HOSP IP/OBS HIGH 75: CPT | Performed by: NURSE PRACTITIONER

## 2022-01-01 PROCEDURE — 96375 TX/PRO/DX INJ NEW DRUG ADDON: CPT

## 2022-01-01 PROCEDURE — 74011250637 HC RX REV CODE- 250/637: Performed by: INTERNAL MEDICINE

## 2022-01-01 PROCEDURE — 85045 AUTOMATED RETICULOCYTE COUNT: CPT

## 2022-01-01 PROCEDURE — 51702 INSERT TEMP BLADDER CATH: CPT

## 2022-01-01 PROCEDURE — 82803 BLOOD GASES ANY COMBINATION: CPT

## 2022-01-01 PROCEDURE — P9016 RBC LEUKOCYTES REDUCED: HCPCS

## 2022-01-01 PROCEDURE — 74011000250 HC RX REV CODE- 250: Performed by: EMERGENCY MEDICINE

## 2022-01-01 PROCEDURE — 83036 HEMOGLOBIN GLYCOSYLATED A1C: CPT

## 2022-01-01 PROCEDURE — 86900 BLOOD TYPING SEROLOGIC ABO: CPT

## 2022-01-01 PROCEDURE — 74011000258 HC RX REV CODE- 258: Performed by: INTERNAL MEDICINE

## 2022-01-01 PROCEDURE — 93970 EXTREMITY STUDY: CPT

## 2022-01-01 PROCEDURE — 85379 FIBRIN DEGRADATION QUANT: CPT

## 2022-01-01 PROCEDURE — 65610000006 HC RM INTENSIVE CARE

## 2022-01-01 PROCEDURE — 85384 FIBRINOGEN ACTIVITY: CPT

## 2022-01-01 PROCEDURE — 74011000258 HC RX REV CODE- 258: Performed by: PHYSICIAN ASSISTANT

## 2022-01-01 PROCEDURE — 87086 URINE CULTURE/COLONY COUNT: CPT

## 2022-01-01 PROCEDURE — 87636 SARSCOV2 & INF A&B AMP PRB: CPT

## 2022-01-01 PROCEDURE — 99233 SBSQ HOSP IP/OBS HIGH 50: CPT | Performed by: NURSE PRACTITIONER

## 2022-01-01 PROCEDURE — 93306 TTE W/DOPPLER COMPLETE: CPT

## 2022-01-01 PROCEDURE — 74011250636 HC RX REV CODE- 250/636: Performed by: FAMILY MEDICINE

## 2022-01-01 PROCEDURE — 71250 CT THORAX DX C-: CPT

## 2022-01-01 PROCEDURE — 80202 ASSAY OF VANCOMYCIN: CPT

## 2022-01-01 PROCEDURE — 83010 ASSAY OF HAPTOGLOBIN QUANT: CPT

## 2022-01-01 PROCEDURE — 82607 VITAMIN B-12: CPT

## 2022-01-01 PROCEDURE — 82728 ASSAY OF FERRITIN: CPT

## 2022-01-01 PROCEDURE — 96365 THER/PROPH/DIAG IV INF INIT: CPT

## 2022-01-01 PROCEDURE — 85018 HEMOGLOBIN: CPT

## 2022-01-01 RX ORDER — DEXTROSE MONOHYDRATE 100 MG/ML
0-250 INJECTION, SOLUTION INTRAVENOUS AS NEEDED
Status: DISCONTINUED | OUTPATIENT
Start: 2022-01-01 | End: 2022-01-01 | Stop reason: SDUPTHER

## 2022-01-01 RX ORDER — MAGNESIUM SULFATE 100 %
4 CRYSTALS MISCELLANEOUS AS NEEDED
Status: DISCONTINUED | OUTPATIENT
Start: 2022-01-01 | End: 2022-01-01

## 2022-01-01 RX ORDER — INSULIN LISPRO 100 [IU]/ML
INJECTION, SOLUTION INTRAVENOUS; SUBCUTANEOUS EVERY 6 HOURS
Status: DISCONTINUED | OUTPATIENT
Start: 2022-01-01 | End: 2022-01-01

## 2022-01-01 RX ORDER — SODIUM CHLORIDE 9 MG/ML
250 INJECTION, SOLUTION INTRAVENOUS AS NEEDED
Status: DISCONTINUED | OUTPATIENT
Start: 2022-01-01 | End: 2022-01-01

## 2022-01-01 RX ORDER — INSULIN LISPRO 100 [IU]/ML
INJECTION, SOLUTION INTRAVENOUS; SUBCUTANEOUS EVERY 6 HOURS
Status: DISCONTINUED | OUTPATIENT
Start: 2022-01-01 | End: 2022-01-01 | Stop reason: SDUPTHER

## 2022-01-01 RX ORDER — MAGNESIUM SULFATE 100 %
4 CRYSTALS MISCELLANEOUS AS NEEDED
Status: DISCONTINUED | OUTPATIENT
Start: 2022-01-01 | End: 2022-01-01 | Stop reason: SDUPTHER

## 2022-01-01 RX ORDER — SCOLOPAMINE TRANSDERMAL SYSTEM 1 MG/1
1 PATCH, EXTENDED RELEASE TRANSDERMAL
Status: DISCONTINUED | OUTPATIENT
Start: 2022-01-01 | End: 2022-11-08 | Stop reason: HOSPADM

## 2022-01-01 RX ORDER — POTASSIUM CHLORIDE 7.45 MG/ML
10 INJECTION INTRAVENOUS
Status: COMPLETED | OUTPATIENT
Start: 2022-01-01 | End: 2022-01-01

## 2022-01-01 RX ORDER — DEXTROSE MONOHYDRATE 100 MG/ML
0-250 INJECTION, SOLUTION INTRAVENOUS AS NEEDED
Status: DISCONTINUED | OUTPATIENT
Start: 2022-01-01 | End: 2022-01-01

## 2022-01-01 RX ORDER — CHLORHEXIDINE GLUCONATE 1.2 MG/ML
10 RINSE ORAL EVERY 12 HOURS
Status: DISCONTINUED | OUTPATIENT
Start: 2022-01-01 | End: 2022-01-01

## 2022-01-01 RX ORDER — NOREPINEPHRINE BITARTRATE/D5W 8 MG/250ML
.5-3 PLASTIC BAG, INJECTION (ML) INTRAVENOUS
Status: DISCONTINUED | OUTPATIENT
Start: 2022-01-01 | End: 2022-01-01

## 2022-01-01 RX ORDER — SODIUM CHLORIDE, SODIUM LACTATE, POTASSIUM CHLORIDE, CALCIUM CHLORIDE 600; 310; 30; 20 MG/100ML; MG/100ML; MG/100ML; MG/100ML
50 INJECTION, SOLUTION INTRAVENOUS CONTINUOUS
Status: DISCONTINUED | OUTPATIENT
Start: 2022-01-01 | End: 2022-01-01

## 2022-01-01 RX ORDER — MAGNESIUM SULFATE 1 G/100ML
1 INJECTION INTRAVENOUS ONCE
Status: COMPLETED | OUTPATIENT
Start: 2022-01-01 | End: 2022-01-01

## 2022-01-01 RX ORDER — SODIUM CHLORIDE 0.9 % (FLUSH) 0.9 %
5-10 SYRINGE (ML) INJECTION AS NEEDED
Status: DISCONTINUED | OUTPATIENT
Start: 2022-01-01 | End: 2022-01-01

## 2022-01-01 RX ORDER — MORPHINE SULFATE 2 MG/ML
2 INJECTION, SOLUTION INTRAMUSCULAR; INTRAVENOUS
Status: DISCONTINUED | OUTPATIENT
Start: 2022-01-01 | End: 2022-11-08 | Stop reason: HOSPADM

## 2022-01-01 RX ORDER — INSULIN GLARGINE 100 [IU]/ML
0.2 INJECTION, SOLUTION SUBCUTANEOUS
Status: DISCONTINUED | OUTPATIENT
Start: 2022-01-01 | End: 2022-01-01

## 2022-01-01 RX ORDER — SODIUM CHLORIDE 9 MG/ML
75 INJECTION, SOLUTION INTRAVENOUS CONTINUOUS
Status: DISCONTINUED | OUTPATIENT
Start: 2022-01-01 | End: 2022-01-01

## 2022-01-01 RX ORDER — HEPARIN SODIUM 5000 [USP'U]/ML
5000 INJECTION, SOLUTION INTRAVENOUS; SUBCUTANEOUS EVERY 8 HOURS
Status: DISCONTINUED | OUTPATIENT
Start: 2022-01-01 | End: 2022-01-01

## 2022-01-01 RX ORDER — LIDOCAINE HYDROCHLORIDE 20 MG/ML
INJECTION, SOLUTION EPIDURAL; INFILTRATION; INTRACAUDAL; PERINEURAL AS NEEDED
Status: SHIPPED | OUTPATIENT
Start: 2022-01-01

## 2022-01-01 RX ORDER — ONDANSETRON 4 MG/1
4 TABLET, ORALLY DISINTEGRATING ORAL
Status: DISCONTINUED | OUTPATIENT
Start: 2022-01-01 | End: 2022-11-08 | Stop reason: HOSPADM

## 2022-01-01 RX ORDER — DEXAMETHASONE SODIUM PHOSPHATE 4 MG/ML
6 INJECTION, SOLUTION INTRA-ARTICULAR; INTRALESIONAL; INTRAMUSCULAR; INTRAVENOUS; SOFT TISSUE ONCE
Status: COMPLETED | OUTPATIENT
Start: 2022-01-01 | End: 2022-01-01

## 2022-01-01 RX ORDER — AMOXICILLIN 250 MG
2 CAPSULE ORAL
Status: DISCONTINUED | OUTPATIENT
Start: 2022-01-01 | End: 2022-11-08 | Stop reason: HOSPADM

## 2022-01-01 RX ORDER — SODIUM CHLORIDE, SODIUM LACTATE, POTASSIUM CHLORIDE, CALCIUM CHLORIDE 600; 310; 30; 20 MG/100ML; MG/100ML; MG/100ML; MG/100ML
100 INJECTION, SOLUTION INTRAVENOUS CONTINUOUS
Status: DISCONTINUED | OUTPATIENT
Start: 2022-01-01 | End: 2022-01-01

## 2022-01-01 RX ORDER — INSULIN LISPRO 100 [IU]/ML
INJECTION, SOLUTION INTRAVENOUS; SUBCUTANEOUS
Status: DISCONTINUED | OUTPATIENT
Start: 2022-01-01 | End: 2022-01-01

## 2022-01-01 RX ORDER — CALCIUM GLUCONATE 20 MG/ML
2 INJECTION, SOLUTION INTRAVENOUS ONCE
Status: COMPLETED | OUTPATIENT
Start: 2022-01-01 | End: 2022-01-01

## 2022-01-01 RX ORDER — DEXTROSE MONOHYDRATE AND SODIUM CHLORIDE 5; .9 G/100ML; G/100ML
125 INJECTION, SOLUTION INTRAVENOUS CONTINUOUS
Status: DISCONTINUED | OUTPATIENT
Start: 2022-01-01 | End: 2022-01-01

## 2022-01-01 RX ORDER — INSULIN LISPRO 100 [IU]/ML
15 INJECTION, SOLUTION INTRAVENOUS; SUBCUTANEOUS ONCE
Status: COMPLETED | OUTPATIENT
Start: 2022-01-01 | End: 2022-01-01

## 2022-01-01 RX ORDER — MAGNESIUM SULFATE HEPTAHYDRATE 40 MG/ML
2 INJECTION, SOLUTION INTRAVENOUS ONCE
Status: COMPLETED | OUTPATIENT
Start: 2022-01-01 | End: 2022-01-01

## 2022-01-01 RX ORDER — LEVOFLOXACIN 5 MG/ML
750 INJECTION, SOLUTION INTRAVENOUS
Status: DISCONTINUED | OUTPATIENT
Start: 2022-01-01 | End: 2022-01-01

## 2022-01-01 RX ORDER — LEVOFLOXACIN 5 MG/ML
750 INJECTION, SOLUTION INTRAVENOUS EVERY 24 HOURS
Status: DISCONTINUED | OUTPATIENT
Start: 2022-01-01 | End: 2022-01-01 | Stop reason: DRUGHIGH

## 2022-01-01 RX ORDER — NOREPINEPHRINE BITARTRATE/D5W 8 MG/250ML
.5-16 PLASTIC BAG, INJECTION (ML) INTRAVENOUS
Status: DISCONTINUED | OUTPATIENT
Start: 2022-01-01 | End: 2022-01-01

## 2022-01-01 RX ORDER — PROPOFOL 10 MG/ML
INJECTION, EMULSION INTRAVENOUS AS NEEDED
Status: SHIPPED | OUTPATIENT
Start: 2022-01-01

## 2022-01-01 RX ORDER — SUCCINYLCHOLINE CHLORIDE 100 MG/5ML
SYRINGE (ML) INTRAVENOUS AS NEEDED
Status: SHIPPED | OUTPATIENT
Start: 2022-01-01

## 2022-01-01 RX ORDER — LEVOFLOXACIN 5 MG/ML
500 INJECTION, SOLUTION INTRAVENOUS
Status: DISCONTINUED | OUTPATIENT
Start: 2022-01-01 | End: 2022-01-01

## 2022-01-01 RX ORDER — LANOLIN ALCOHOL/MO/W.PET/CERES
1 CREAM (GRAM) TOPICAL
Status: DISCONTINUED | OUTPATIENT
Start: 2022-01-01 | End: 2022-11-08 | Stop reason: HOSPADM

## 2022-01-01 RX ORDER — PHENYLEPHRINE HCL IN 0.9% NACL 1 MG/10 ML
SYRINGE (ML) INTRAVENOUS AS NEEDED
Status: SHIPPED | OUTPATIENT
Start: 2022-01-01

## 2022-01-01 RX ORDER — SODIUM BICARBONATE 84 MG/ML
150 INJECTION, SOLUTION INTRAVENOUS ONCE
Status: COMPLETED | OUTPATIENT
Start: 2022-01-01 | End: 2022-01-01

## 2022-01-01 RX ORDER — INSULIN GLARGINE 100 [IU]/ML
0.3 INJECTION, SOLUTION SUBCUTANEOUS
Status: DISCONTINUED | OUTPATIENT
Start: 2022-01-01 | End: 2022-01-01

## 2022-01-01 RX ORDER — MIDAZOLAM IN 0.9 % SOD.CHLORID 1 MG/ML
0-10 PLASTIC BAG, INJECTION (ML) INTRAVENOUS
Status: DISCONTINUED | OUTPATIENT
Start: 2022-01-01 | End: 2022-11-08 | Stop reason: HOSPADM

## 2022-01-01 RX ORDER — DEXAMETHASONE SODIUM PHOSPHATE 4 MG/ML
6 INJECTION, SOLUTION INTRA-ARTICULAR; INTRALESIONAL; INTRAMUSCULAR; INTRAVENOUS; SOFT TISSUE EVERY 24 HOURS
Status: DISCONTINUED | OUTPATIENT
Start: 2022-01-01 | End: 2022-11-08 | Stop reason: HOSPADM

## 2022-01-01 RX ORDER — FENTANYL CITRATE 50 UG/ML
25-100 INJECTION, SOLUTION INTRAMUSCULAR; INTRAVENOUS
Status: DISCONTINUED | OUTPATIENT
Start: 2022-01-01 | End: 2022-11-08 | Stop reason: HOSPADM

## 2022-01-01 RX ORDER — VANCOMYCIN HYDROCHLORIDE
1250 ONCE
Status: COMPLETED | OUTPATIENT
Start: 2022-01-01 | End: 2022-01-01

## 2022-01-01 RX ADMIN — SODIUM CHLORIDE, PRESERVATIVE FREE 40 MG: 5 INJECTION INTRAVENOUS at 22:17

## 2022-01-01 RX ADMIN — NOREPINEPHRINE BITARTRATE 12 MCG/MIN: 8 INJECTION, SOLUTION INTRAVENOUS at 05:43

## 2022-01-01 RX ADMIN — PIPERACILLIN AND TAZOBACTAM 4.5 G: 4; .5 INJECTION, POWDER, FOR SOLUTION INTRAVENOUS at 19:56

## 2022-01-01 RX ADMIN — VANCOMYCIN HYDROCHLORIDE 1250 MG: 10 INJECTION, POWDER, LYOPHILIZED, FOR SOLUTION INTRAVENOUS at 15:01

## 2022-01-01 RX ADMIN — SODIUM BICARBONATE: 84 INJECTION, SOLUTION INTRAVENOUS at 05:15

## 2022-01-01 RX ADMIN — CHLORHEXIDINE GLUCONATE 10 ML: 1.2 RINSE BUCCAL at 10:03

## 2022-01-01 RX ADMIN — NOREPINEPHRINE BITARTRATE 7 MCG/MIN: 8 INJECTION, SOLUTION INTRAVENOUS at 01:50

## 2022-01-01 RX ADMIN — SODIUM CHLORIDE, PRESERVATIVE FREE 40 MG: 5 INJECTION INTRAVENOUS at 09:11

## 2022-01-01 RX ADMIN — MAGNESIUM SULFATE HEPTAHYDRATE 1 G: 1 INJECTION, SOLUTION INTRAVENOUS at 06:49

## 2022-01-01 RX ADMIN — CHLORHEXIDINE GLUCONATE 10 ML: 1.2 RINSE BUCCAL at 21:16

## 2022-01-01 RX ADMIN — POTASSIUM CHLORIDE 10 MEQ: 7.46 INJECTION, SOLUTION INTRAVENOUS at 16:28

## 2022-01-01 RX ADMIN — Medication 4 UNITS: at 20:47

## 2022-01-01 RX ADMIN — SODIUM CHLORIDE, PRESERVATIVE FREE 40 MG: 5 INJECTION INTRAVENOUS at 21:15

## 2022-01-01 RX ADMIN — INSULIN GLARGINE 12 UNITS: 100 INJECTION, SOLUTION SUBCUTANEOUS at 22:17

## 2022-01-01 RX ADMIN — SODIUM BICARBONATE: 84 INJECTION, SOLUTION INTRAVENOUS at 18:06

## 2022-01-01 RX ADMIN — Medication 2 UNITS: at 09:22

## 2022-01-01 RX ADMIN — SODIUM CHLORIDE, POTASSIUM CHLORIDE, SODIUM LACTATE AND CALCIUM CHLORIDE 100 ML/HR: 600; 310; 30; 20 INJECTION, SOLUTION INTRAVENOUS at 18:21

## 2022-01-01 RX ADMIN — Medication 2 UNITS: at 17:56

## 2022-01-01 RX ADMIN — SODIUM PHOSPHATE, MONOBASIC, MONOHYDRATE 9 MMOL: 276; 142 INJECTION, SOLUTION INTRAVENOUS at 06:41

## 2022-01-01 RX ADMIN — Medication 100 MG: at 05:19

## 2022-01-01 RX ADMIN — ALBUMIN HUMAN 25 G: 0.25 SOLUTION INTRAVENOUS at 09:54

## 2022-01-01 RX ADMIN — DEXAMETHASONE SODIUM PHOSPHATE 6 MG: 4 INJECTION, SOLUTION INTRAMUSCULAR; INTRAVENOUS at 09:12

## 2022-01-01 RX ADMIN — MAGNESIUM SULFATE HEPTAHYDRATE 1 G: 1 INJECTION, SOLUTION INTRAVENOUS at 17:55

## 2022-01-01 RX ADMIN — FENTANYL CITRATE 50 MCG: 0.05 INJECTION, SOLUTION INTRAMUSCULAR; INTRAVENOUS at 06:14

## 2022-01-01 RX ADMIN — NOREPINEPHRINE BITARTRATE 4 MCG/MIN: 8 INJECTION, SOLUTION INTRAVENOUS at 05:02

## 2022-01-01 RX ADMIN — NOREPINEPHRINE BITARTRATE 9 MCG/MIN: 8 INJECTION, SOLUTION INTRAVENOUS at 17:14

## 2022-01-01 RX ADMIN — SODIUM CHLORIDE, PRESERVATIVE FREE 40 MG: 5 INJECTION INTRAVENOUS at 08:05

## 2022-01-01 RX ADMIN — FERROUS SULFATE TAB 325 MG (65 MG ELEMENTAL FE) 325 MG: 325 (65 FE) TAB at 08:48

## 2022-01-01 RX ADMIN — NOREPINEPHRINE BITARTRATE 23 MCG/MIN: 8 INJECTION, SOLUTION INTRAVENOUS at 17:15

## 2022-01-01 RX ADMIN — Medication 150 MCG: at 05:25

## 2022-01-01 RX ADMIN — SODIUM BICARBONATE 150 MEQ: 84 INJECTION, SOLUTION INTRAVENOUS at 05:14

## 2022-01-01 RX ADMIN — NOREPINEPHRINE BITARTRATE 7 MCG/MIN: 8 INJECTION, SOLUTION INTRAVENOUS at 05:39

## 2022-01-01 RX ADMIN — POTASSIUM CHLORIDE 10 MEQ: 7.46 INJECTION, SOLUTION INTRAVENOUS at 18:27

## 2022-01-01 RX ADMIN — POTASSIUM CHLORIDE 10 MEQ: 7.46 INJECTION, SOLUTION INTRAVENOUS at 18:16

## 2022-01-01 RX ADMIN — PIPERACILLIN AND TAZOBACTAM 4.5 G: 4; .5 INJECTION, POWDER, FOR SOLUTION INTRAVENOUS at 12:14

## 2022-01-01 RX ADMIN — POTASSIUM CHLORIDE 10 MEQ: 7.46 INJECTION, SOLUTION INTRAVENOUS at 19:56

## 2022-01-01 RX ADMIN — PIPERACILLIN AND TAZOBACTAM 3.38 G: 3; .375 INJECTION, POWDER, FOR SOLUTION INTRAVENOUS at 14:16

## 2022-01-01 RX ADMIN — NOREPINEPHRINE BITARTRATE 21 MCG/MIN: 8 INJECTION, SOLUTION INTRAVENOUS at 22:40

## 2022-01-01 RX ADMIN — Medication 6 UNITS: at 16:51

## 2022-01-01 RX ADMIN — INSULIN LISPRO 10 UNITS: 100 INJECTION, SOLUTION INTRAVENOUS; SUBCUTANEOUS at 16:21

## 2022-01-01 RX ADMIN — MIDAZOLAM 8 MG/HR: 5 INJECTION INTRAMUSCULAR; INTRAVENOUS at 02:46

## 2022-01-01 RX ADMIN — DEXTROSE MONOHYDRATE 250 ML: 100 INJECTION, SOLUTION INTRAVENOUS at 22:00

## 2022-01-01 RX ADMIN — PIPERACILLIN AND TAZOBACTAM 3.38 G: 3; .375 INJECTION, POWDER, FOR SOLUTION INTRAVENOUS at 04:19

## 2022-01-01 RX ADMIN — VASOPRESSIN: 20 INJECTION, SOLUTION INTRAVENOUS at 03:59

## 2022-01-01 RX ADMIN — PROPOFOL 130 MG: 10 INJECTION, EMULSION INTRAVENOUS at 05:19

## 2022-01-01 RX ADMIN — MORPHINE SULFATE 2 MG: 2 INJECTION, SOLUTION INTRAMUSCULAR; INTRAVENOUS at 18:56

## 2022-01-01 RX ADMIN — Medication 2 UNITS: at 22:16

## 2022-01-01 RX ADMIN — Medication 2 UNITS: at 14:00

## 2022-01-01 RX ADMIN — DEXTROSE MONOHYDRATE 250 ML: 100 INJECTION, SOLUTION INTRAVENOUS at 00:45

## 2022-01-01 RX ADMIN — LEVOFLOXACIN 750 MG: 5 INJECTION, SOLUTION INTRAVENOUS at 12:42

## 2022-01-01 RX ADMIN — Medication 8 UNITS: at 21:33

## 2022-01-01 RX ADMIN — INSULIN GLARGINE 12 UNITS: 100 INJECTION, SOLUTION SUBCUTANEOUS at 14:16

## 2022-01-01 RX ADMIN — NOREPINEPHRINE BITARTRATE 2 MCG/MIN: 8 INJECTION, SOLUTION INTRAVENOUS at 05:27

## 2022-01-01 RX ADMIN — MIDAZOLAM 2 MG/HR: 5 INJECTION INTRAMUSCULAR; INTRAVENOUS at 05:57

## 2022-01-01 RX ADMIN — PIPERACILLIN AND TAZOBACTAM 4.5 G: 4; .5 INJECTION, POWDER, FOR SOLUTION INTRAVENOUS at 04:48

## 2022-01-01 RX ADMIN — SODIUM CHLORIDE, PRESERVATIVE FREE 40 MG: 5 INJECTION INTRAVENOUS at 08:41

## 2022-01-01 RX ADMIN — SODIUM CHLORIDE, POTASSIUM CHLORIDE, SODIUM LACTATE AND CALCIUM CHLORIDE 50 ML/HR: 600; 310; 30; 20 INJECTION, SOLUTION INTRAVENOUS at 15:50

## 2022-01-01 RX ADMIN — CALCIUM GLUCONATE 2 G: 20 INJECTION, SOLUTION INTRAVENOUS at 07:21

## 2022-01-01 RX ADMIN — Medication 4 UNITS: at 22:10

## 2022-01-01 RX ADMIN — Medication 8 UNITS: at 16:30

## 2022-01-01 RX ADMIN — Medication 2 UNITS: at 11:30

## 2022-01-01 RX ADMIN — DEXAMETHASONE SODIUM PHOSPHATE 6 MG: 4 INJECTION, SOLUTION INTRAMUSCULAR; INTRAVENOUS at 08:41

## 2022-01-01 RX ADMIN — SODIUM CHLORIDE, PRESERVATIVE FREE 40 MG: 5 INJECTION INTRAVENOUS at 20:42

## 2022-01-01 RX ADMIN — Medication 6 UNITS: at 17:55

## 2022-01-01 RX ADMIN — PIPERACILLIN AND TAZOBACTAM 3.38 G: 3; .375 INJECTION, POWDER, FOR SOLUTION INTRAVENOUS at 19:51

## 2022-01-01 RX ADMIN — CHLORHEXIDINE GLUCONATE 10 ML: 1.2 RINSE BUCCAL at 09:11

## 2022-01-01 RX ADMIN — SODIUM CHLORIDE, PRESERVATIVE FREE 40 MG: 5 INJECTION INTRAVENOUS at 21:43

## 2022-01-01 RX ADMIN — DEXAMETHASONE SODIUM PHOSPHATE 6 MG: 4 INJECTION, SOLUTION INTRAMUSCULAR; INTRAVENOUS at 19:22

## 2022-01-01 RX ADMIN — NOREPINEPHRINE BITARTRATE 4 MCG/MIN: 8 INJECTION, SOLUTION INTRAVENOUS at 13:48

## 2022-01-01 RX ADMIN — Medication 6 UNITS: at 09:11

## 2022-01-01 RX ADMIN — MIDAZOLAM 8 MG/HR: 5 INJECTION INTRAMUSCULAR; INTRAVENOUS at 14:24

## 2022-01-01 RX ADMIN — SODIUM BICARBONATE: 84 INJECTION, SOLUTION INTRAVENOUS at 05:36

## 2022-01-01 RX ADMIN — Medication 2 UNITS: at 08:50

## 2022-01-01 RX ADMIN — MIDAZOLAM 7 MG/HR: 5 INJECTION INTRAMUSCULAR; INTRAVENOUS at 17:12

## 2022-01-01 RX ADMIN — LEVOFLOXACIN 500 MG: 5 INJECTION, SOLUTION INTRAVENOUS at 13:20

## 2022-01-01 RX ADMIN — MAGNESIUM SULFATE HEPTAHYDRATE 2 G: 40 INJECTION, SOLUTION INTRAVENOUS at 16:33

## 2022-01-01 RX ADMIN — LEVOFLOXACIN 750 MG: 750 INJECTION, SOLUTION INTRAVENOUS at 13:37

## 2022-01-01 RX ADMIN — SODIUM CHLORIDE 836 ML: 900 INJECTION, SOLUTION INTRAVENOUS at 12:39

## 2022-01-01 RX ADMIN — LIDOCAINE HYDROCHLORIDE 60 MG: 20 INJECTION, SOLUTION EPIDURAL; INFILTRATION; INTRACAUDAL; PERINEURAL at 05:19

## 2022-01-01 RX ADMIN — SODIUM CHLORIDE, PRESERVATIVE FREE 40 MG: 5 INJECTION INTRAVENOUS at 08:48

## 2022-01-01 RX ADMIN — DEXTROSE MONOHYDRATE 250 ML: 100 INJECTION, SOLUTION INTRAVENOUS at 05:38

## 2022-01-01 RX ADMIN — FERROUS SULFATE TAB 325 MG (65 MG ELEMENTAL FE) 325 MG: 325 (65 FE) TAB at 09:12

## 2022-01-01 RX ADMIN — SODIUM CHLORIDE, PRESERVATIVE FREE 40 MG: 5 INJECTION INTRAVENOUS at 09:01

## 2022-01-01 RX ADMIN — INSULIN GLARGINE 12 UNITS: 100 INJECTION, SOLUTION SUBCUTANEOUS at 21:16

## 2022-01-01 RX ADMIN — FERROUS SULFATE TAB 325 MG (65 MG ELEMENTAL FE) 325 MG: 325 (65 FE) TAB at 08:41

## 2022-01-01 RX ADMIN — Medication 150 MCG: at 05:22

## 2022-01-01 RX ADMIN — INSULIN LISPRO 15 UNITS: 100 INJECTION, SOLUTION INTRAVENOUS; SUBCUTANEOUS at 18:26

## 2022-01-01 RX ADMIN — NOREPINEPHRINE BITARTRATE 8 MCG/MIN: 8 INJECTION, SOLUTION INTRAVENOUS at 05:33

## 2022-01-01 RX ADMIN — DEXAMETHASONE SODIUM PHOSPHATE 6 MG: 4 INJECTION, SOLUTION INTRAMUSCULAR; INTRAVENOUS at 08:05

## 2022-01-01 RX ADMIN — DEXAMETHASONE SODIUM PHOSPHATE 6 MG: 4 INJECTION, SOLUTION INTRAMUSCULAR; INTRAVENOUS at 08:48

## 2022-01-01 RX ADMIN — FENTANYL CITRATE 100 MCG: 0.05 INJECTION, SOLUTION INTRAMUSCULAR; INTRAVENOUS at 22:15

## 2022-01-01 RX ADMIN — Medication 4 UNITS: at 12:02

## 2022-01-01 RX ADMIN — Medication 3 UNITS: at 13:24

## 2022-01-01 RX ADMIN — INSULIN GLARGINE 12 UNITS: 100 INJECTION, SOLUTION SUBCUTANEOUS at 20:47

## 2022-01-01 RX ADMIN — SODIUM CHLORIDE, PRESERVATIVE FREE 40 MG: 5 INJECTION INTRAVENOUS at 22:36

## 2022-01-01 RX ADMIN — SODIUM CHLORIDE 75 ML/HR: 9 INJECTION, SOLUTION INTRAVENOUS at 09:22

## 2022-01-01 RX ADMIN — CALCIUM GLUCONATE 2 G: 20 INJECTION, SOLUTION INTRAVENOUS at 05:46

## 2022-01-01 RX ADMIN — NOREPINEPHRINE BITARTRATE 23 MCG/MIN: 8 INJECTION, SOLUTION INTRAVENOUS at 18:59

## 2022-01-01 RX ADMIN — SODIUM CHLORIDE 1000 ML: 9 INJECTION, SOLUTION INTRAVENOUS at 12:14

## 2022-08-27 ENCOUNTER — HOSPITAL ENCOUNTER (EMERGENCY)
Age: 82
Discharge: HOME | End: 2022-08-27
Attending: EMERGENCY MEDICINE
Payer: MEDICARE

## 2022-08-27 ENCOUNTER — APPOINTMENT (OUTPATIENT)
Dept: CT IMAGING | Age: 82
End: 2022-08-27
Attending: EMERGENCY MEDICINE
Payer: MEDICARE

## 2022-08-27 ENCOUNTER — APPOINTMENT (OUTPATIENT)
Dept: GENERAL RADIOLOGY | Age: 82
End: 2022-08-27
Attending: EMERGENCY MEDICINE
Payer: MEDICARE

## 2022-08-27 VITALS
BODY MASS INDEX: 21.07 KG/M2 | HEART RATE: 85 BPM | DIASTOLIC BLOOD PRESSURE: 66 MMHG | SYSTOLIC BLOOD PRESSURE: 119 MMHG | TEMPERATURE: 99.5 F | RESPIRATION RATE: 26 BRPM | WEIGHT: 139 LBS | OXYGEN SATURATION: 100 % | HEIGHT: 68 IN
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
## 2022-08-27 LAB
ABO + RH BLD: NORMAL
ALBUMIN SERPL-MCNC: 2.9 G/DL (ref 3.4–5)
ALBUMIN/GLOB SERPL: 0.7 {RATIO} (ref 0.8–1.7)
ALP SERPL-CCNC: 84 U/L (ref 45–117)
ALT SERPL-CCNC: 49 U/L (ref 16–61)
ANION GAP SERPL CALC-SCNC: 11 MMOL/L (ref 3–18)
AST SERPL-CCNC: 55 U/L (ref 10–38)
BASOPHILS # BLD: 0 K/UL (ref 0–0.1)
BASOPHILS NFR BLD: 0 % (ref 0–2)
BILIRUB SERPL-MCNC: 0.5 MG/DL (ref 0.2–1)
BLOOD GROUP ANTIBODIES SERPL: NORMAL
BNP SERPL-MCNC: 103 PG/ML (ref 0–1800)
BUN SERPL-MCNC: 50 MG/DL (ref 7–18)
BUN/CREAT SERPL: 23 (ref 12–20)
CALCIUM SERPL-MCNC: 9 MG/DL (ref 8.5–10.1)
CHLORIDE SERPL-SCNC: 109 MMOL/L (ref 100–111)
CO2 SERPL-SCNC: 19 MMOL/L (ref 21–32)
CREAT SERPL-MCNC: 2.14 MG/DL (ref 0.6–1.3)
DIFFERENTIAL METHOD BLD: ABNORMAL
EOSINOPHIL # BLD: 0 K/UL (ref 0–0.4)
EOSINOPHIL NFR BLD: 1 % (ref 0–5)
ERYTHROCYTE [DISTWIDTH] IN BLOOD BY AUTOMATED COUNT: 13.5 % (ref 11.6–14.5)
ETHANOL SERPL-MCNC: <3 MG/DL (ref 0–3)
GLOBULIN SER CALC-MCNC: 4 G/DL (ref 2–4)
GLUCOSE SERPL-MCNC: 144 MG/DL (ref 74–99)
HCT VFR BLD AUTO: 31.8 % (ref 36–48)
HGB BLD-MCNC: 10.4 G/DL (ref 13–16)
IMM GRANULOCYTES # BLD AUTO: 0 K/UL
IMM GRANULOCYTES NFR BLD AUTO: 0 %
INR PPP: 1.2 (ref 0.8–1.2)
LACTATE SERPL-SCNC: 4.4 MMOL/L (ref 0.4–2)
LYMPHOCYTES # BLD: 1.6 K/UL (ref 0.9–3.6)
LYMPHOCYTES NFR BLD: 76 % (ref 21–52)
MAGNESIUM SERPL-MCNC: 1.9 MG/DL (ref 1.6–2.6)
MCH RBC QN AUTO: 33.9 PG (ref 24–34)
MCHC RBC AUTO-ENTMCNC: 32.7 G/DL (ref 31–37)
MCV RBC AUTO: 103.6 FL (ref 78–100)
MONOCYTES # BLD: 0 K/UL (ref 0.05–1.2)
MONOCYTES NFR BLD: 1 % (ref 3–10)
NEUTS SEG # BLD: 0.4 K/UL (ref 1.8–8)
NEUTS SEG NFR BLD: 22 % (ref 40–73)
NRBC # BLD: 0.03 K/UL (ref 0–0.01)
PLATELET # BLD AUTO: 207 K/UL (ref 135–420)
PMV BLD AUTO: 12 FL (ref 9.2–11.8)
POTASSIUM SERPL-SCNC: 5.9 MMOL/L (ref 3.5–5.5)
PROT SERPL-MCNC: 6.9 G/DL (ref 6.4–8.2)
PROTHROMBIN TIME: 15.2 SEC (ref 11.5–15.2)
RBC # BLD AUTO: 3.07 M/UL (ref 4.35–5.65)
RBC MORPH BLD: ABNORMAL
SODIUM SERPL-SCNC: 139 MMOL/L (ref 136–145)
SPECIMEN EXP DATE BLD: NORMAL
TROPONIN-HIGH SENSITIVITY: 26 NG/L (ref 0–78)
WBC # BLD AUTO: 2 K/UL (ref 4.6–13.2)

## 2022-08-27 PROCEDURE — 74011000636 HC RX REV CODE- 636: Performed by: EMERGENCY MEDICINE

## 2022-08-27 PROCEDURE — 84484 ASSAY OF TROPONIN QUANT: CPT

## 2022-08-27 PROCEDURE — 74011250636 HC RX REV CODE- 250/636: Performed by: EMERGENCY MEDICINE

## 2022-08-27 PROCEDURE — 85610 PROTHROMBIN TIME: CPT

## 2022-08-27 PROCEDURE — 80053 COMPREHEN METABOLIC PANEL: CPT

## 2022-08-27 PROCEDURE — 72125 CT NECK SPINE W/O DYE: CPT

## 2022-08-27 PROCEDURE — 85025 COMPLETE CBC W/AUTO DIFF WBC: CPT

## 2022-08-27 PROCEDURE — 82077 ASSAY SPEC XCP UR&BREATH IA: CPT

## 2022-08-27 PROCEDURE — 86900 BLOOD TYPING SEROLOGIC ABO: CPT

## 2022-08-27 PROCEDURE — 83735 ASSAY OF MAGNESIUM: CPT

## 2022-08-27 PROCEDURE — 83880 ASSAY OF NATRIURETIC PEPTIDE: CPT

## 2022-08-27 PROCEDURE — 71045 X-RAY EXAM CHEST 1 VIEW: CPT

## 2022-08-27 PROCEDURE — 83605 ASSAY OF LACTIC ACID: CPT

## 2022-08-27 PROCEDURE — 74177 CT ABD & PELVIS W/CONTRAST: CPT

## 2022-08-27 PROCEDURE — 70450 CT HEAD/BRAIN W/O DYE: CPT

## 2022-08-27 PROCEDURE — 99285 EMERGENCY DEPT VISIT HI MDM: CPT

## 2022-08-27 PROCEDURE — 93005 ELECTROCARDIOGRAM TRACING: CPT

## 2022-08-27 RX ADMIN — SODIUM CHLORIDE 1262 ML: 9 INJECTION, SOLUTION INTRAVENOUS at 18:32

## 2022-08-27 RX ADMIN — SODIUM CHLORIDE 1000 ML: 9 INJECTION, SOLUTION INTRAVENOUS at 17:38

## 2022-08-27 RX ADMIN — IOPAMIDOL 100 ML: 612 INJECTION, SOLUTION INTRAVENOUS at 17:59
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
## 2022-08-28 NOTE — DISCHARGE INSTRUCTIONS
Use Tylenol or ibuprofen over-the-counter for pain. Ice can also be used. Return to the ED for worsening symptoms or for other concerns. Follow-up with your primary care doctor or 1 of ours.
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
## 2022-09-02 LAB
ATRIAL RATE: 124 BPM
CALCULATED P AXIS, ECG09: 68 DEGREES
CALCULATED R AXIS, ECG10: 60 DEGREES
CALCULATED T AXIS, ECG11: 80 DEGREES
DIAGNOSIS, 93000: NORMAL
P-R INTERVAL, ECG05: 120 MS
Q-T INTERVAL, ECG07: 292 MS
QRS DURATION, ECG06: 64 MS
QTC CALCULATION (BEZET), ECG08: 419 MS
VENTRICULAR RATE, ECG03: 124 BPM

## 2022-11-02 PROBLEM — T68.XXXA HYPOTHERMIA: Status: ACTIVE | Noted: 2022-11-02

## 2022-11-02 PROBLEM — R65.21 SEPTIC SHOCK (HCC): Status: ACTIVE | Noted: 2022-01-01

## 2022-11-02 PROBLEM — A41.9 SEPTIC SHOCK (HCC): Status: ACTIVE | Noted: 2022-01-01

## 2022-11-02 PROBLEM — N17.9 AKI (ACUTE KIDNEY INJURY) (HCC): Status: ACTIVE | Noted: 2022-01-01

## 2022-11-02 PROBLEM — J18.9 PNEUMONIA: Status: ACTIVE | Noted: 2022-01-01

## 2022-11-02 PROBLEM — E87.6 HYPOKALEMIA: Status: ACTIVE | Noted: 2022-01-01

## 2022-11-02 NOTE — PROGRESS NOTES
Palliative Medicine    Thank you for the consult to the Palliative Medicine team. Chart review in progress. We will see Mindinéstor Aiden as quickly as our schedule allows.     Samuel Russell, RN, MSN  Palliative Medicine  [phone]

## 2022-11-02 NOTE — ED NOTES
TRANSFER - OUT REPORT:    Verbal report given to Jelena (name) on Ale Loaiza  being transferred to 103(unit) for routine progression of care       Report consisted of patients Situation, Background, Assessment and   Recommendations(SBAR). Information from the following report(s) SBAR, Kardex and ED Summary was reviewed with the receiving nurse. Lines:   Single Lumen Venous Catheter 11/02/22 Right Subclavian (Active)   Central Line Being Utilized Yes 11/02/22 1148   Site Assessment Clean, dry, & intact 11/02/22 1148   Date of Last Dressing Change 11/02/22 11/02/22 1148   Dressing Status Clean, dry, & intact 11/02/22 1148   Dressing Type Transparent 11/02/22 1148   Positive Blood Return (Medial Site) Yes 11/02/22 1148        Opportunity for questions and clarification was provided.       Patient transported with:   Monitor

## 2022-11-02 NOTE — ED PROVIDER NOTES
EMERGENCY DEPARTMENT HISTORY AND PHYSICAL EXAM    Date: 11/2/2022  Patient Name: Mary Bernardo    History of Presenting Illness     Chief Complaint   Patient presents with    Shortness of Breath       History Provided By: Patient, Patient's Wife, and EMS     History Lennox Hurry):   11:40 AM  Mary Bernardo is a [de-identified] y.o. male with a PMHX of hypertension, prostate cancer, colon cancer, COVID-pneumonia (10 days ago)  who presents to the emergency department (room 3) EMS C/O shortness of breath onset over the last 2 days but worse today. Associated sxs include hypoxia, weakness. Pt denies any other sxs or complaints. Patient and daughter state that he was admitted to [facility] for 1 day approximately 10 days ago. He was diagnosed with COVID-pneumonia at that time and discharged home. He did complete a course of antibiotics but finished approximately 2 days ago. Patient has home health and this morning the nurse found him to be struggling to breathe. He was hypoxic on room air at home. Patient does not have home oxygen. EMS was called. Family also noted that the patient has been weak today. Chief Complaint: Shortness of breath  Onset: 2 days ago  Timing:  Acute  Context: Symptoms started spontaneously, symptoms have progressively worsened since onset  Location: Lungs  Quality:  Short of air  Severity: Severe  Modifying Factors: Nothing makes it better, or worse. Associated Symptoms:  Hypoxia, weakness    PCP: Ibrahima Langston MD     Past History         Past Medical History:  Past Medical History:   Diagnosis Date    Endocrine disease     Hypertension        Past Surgical History:  History reviewed. No pertinent surgical history. Family History:  History reviewed. No pertinent family history.   Reviewed and non-contributory    Social History:       Medications:  Current Facility-Administered Medications   Medication Dose Route Frequency Provider Last Rate Last Admin    sodium chloride (NS) flush 5-10 mL  5-10 mL IntraVENous PRN Luke Corea MD        Vancomycin - Pharmacy to Dose  1 Each Other Rx Dosing/Monitoring Luke Corea MD        NOREPINephrine (LEVOPHED) 8 mg in 5% dextrose 250mL (32 mcg/mL) infusion  0.5-16 mcg/min IntraVENous TITRATE Imelda Alfaro MD 15 mL/hr at 11/02/22 1818 8 mcg/min at 11/02/22 1818    [START ON 11/4/2022] levoFLOXacin (LEVAQUIN) 750 mg in D5W IVPB  750 mg IntraVENous Q48H Luke Corea MD        piperacillin-tazobactam (ZOSYN) 4.5 g in 0.9% sodium chloride (MBP/ADV) 100 mL MBP  4.5 g IntraVENous Q8H Luke Corea  mL/hr at 11/02/22 1956 4.5 g at 11/02/22 1956    [START ON 11/3/2022] Vancomycin Random level due 11/3/22 at 14:00  1 Each Other ONCE Imelda Alfaro MD        ELECTROLYTE REPLACEMENT PROTOCOL - Potassium Renal Dosing  1 Each Other PRN Girish BRICENO MD        ELECTROLYTE REPLACEMENT PROTOCOL - Magnesium   1 Each Other PRN Girish BRICENO MD        ELECTROLYTE REPLACEMENT PROTOCOL  - Phosphorus Renal Dosing  1 Each Other PRN Girish BRICENO MD        ELECTROLYTE REPLACEMENT PROTOCOL - Calcium   1 Each Other PRN Kylee Rivas MD        [Held by provider] insulin glargine (LANTUS) injection 12 Units  0.2 Units/kg SubCUTAneous QHS Reed Muniz MD        insulin lispro (HUMALOG) injection   SubCUTAneous AC&HS Dennis Gonzalez MD        glucose chewable tablet 16 g  4 Tablet Oral PRN Dennis Gonzalez MD        glucagon (GLUCAGEN) injection 1 mg  1 mg IntraMUSCular PRN Dennis Gonzalez MD        dextrose 10% infusion 0-250 mL  0-250 mL IntraVENous PRN Dennis Gonzalez MD        pantoprazole (PROTONIX) 40 mg in 0.9% sodium chloride 10 mL injection  40 mg IntraVENous Q12H Dennsi Gonzalez MD   40 mg at 11/02/22 2236    [Held by provider] heparin (porcine) injection 5,000 Units  5,000 Units SubCUTAneous Q8H Dennis Gonzalez MD        dextrose 5% and 0.9% NaCl infusion  75 mL/hr IntraVENous CONTINUOUS Khang Shafer MD 75 mL/hr at 11/02/22 2243 75 mL/hr at 11/02/22 2243       Allergies:  No Known Allergies    Social Determinants of Health:  Social Determinants of Health     Tobacco Use: Not on file   Alcohol Use: Not on file   Financial Resource Strain: Not on file   Food Insecurity: Not on file   Transportation Needs: Not on file   Physical Activity: Not on file   Stress: Not on file   Social Connections: Not on file   Intimate Partner Violence: Not on file   Depression: Not on file   Housing Stability: Not on file       Review of Systems      Review of Systems   Constitutional:  Negative for chills and fever. HENT:  Negative for rhinorrhea and sore throat. Eyes:  Negative for pain and visual disturbance. Respiratory:  Positive for shortness of breath. Negative for chest tightness and wheezing. Cardiovascular:  Negative for chest pain and palpitations. Gastrointestinal:  Negative for abdominal pain, diarrhea, nausea and vomiting. Musculoskeletal:  Negative for arthralgias and myalgias. Skin:  Negative for rash and wound. Neurological:  Positive for weakness. Negative for speech difficulty, light-headedness and headaches. Psychiatric/Behavioral:  Negative for agitation and confusion. All other systems reviewed and are negative. Physical Exam     Vitals:    11/02/22 1830 11/02/22 1900 11/02/22 1945 11/02/22 2000   BP: (!) 113/49 126/60 96/73 (!) 121/56   Pulse:  100 (!) 108 (!) 115   Resp: 21 20 28 (!) 34   Temp: (!) 96.4 °F (35.8 °C) 96.8 °F (36 °C) 97.2 °F (36.2 °C) 97.2 °F (36.2 °C)   SpO2: 97% 99% 99% 100%   Weight:       Height:           Physical Exam  Vitals and nursing note reviewed. Constitutional:       General: He is not in acute distress. Appearance: Normal appearance. He is underweight. He is not ill-appearing. HENT:      Head: Normocephalic and atraumatic. Nose: Nose normal. No rhinorrhea.       Mouth/Throat:      Mouth: Mucous membranes are moist.      Pharynx: No oropharyngeal exudate or posterior oropharyngeal erythema. Eyes:      Extraocular Movements: Extraocular movements intact. Conjunctiva/sclera: Conjunctivae normal.      Pupils: Pupils are equal, round, and reactive to light. Cardiovascular:      Rate and Rhythm: Normal rate and regular rhythm. Heart sounds: No murmur heard. No friction rub. No gallop. Pulmonary:      Effort: Pulmonary effort is normal. No respiratory distress. Breath sounds: Normal breath sounds. No wheezing, rhonchi or rales. Abdominal:      General: Bowel sounds are normal.      Palpations: Abdomen is soft. Tenderness: There is no abdominal tenderness. There is no guarding or rebound. Musculoskeletal:         General: No swelling, tenderness or deformity. Normal range of motion. Cervical back: Normal range of motion and neck supple. No rigidity. Lymphadenopathy:      Cervical: No cervical adenopathy. Skin:     General: Skin is warm and dry. Findings: No rash. Neurological:      General: No focal deficit present. Mental Status: He is alert and oriented to person, place, and time.    Psychiatric:         Mood and Affect: Mood normal.         Behavior: Behavior normal.       Diagnostic Study Results     Labs -  Recent Results (from the past 12 hour(s))   TROPONIN-HIGH SENSITIVITY    Collection Time: 11/02/22 12:00 PM   Result Value Ref Range    Troponin-High Sensitivity 21 0 - 78 ng/L   METABOLIC PANEL, COMPREHENSIVE    Collection Time: 11/02/22 12:00 PM   Result Value Ref Range    Sodium 142 136 - 145 mmol/L    Potassium 3.2 (L) 3.5 - 5.5 mmol/L    Chloride 116 (H) 100 - 111 mmol/L    CO2 13 (L) 21 - 32 mmol/L    Anion gap 13 3.0 - 18 mmol/L    Glucose 300 (H) 74 - 99 mg/dL    BUN 38 (H) 7.0 - 18 MG/DL    Creatinine 1.79 (H) 0.6 - 1.3 MG/DL    BUN/Creatinine ratio 21 (H) 12 - 20      eGFR 38 (L) >60 ml/min/1.73m2    Calcium 7.5 (L) 8.5 - 10.1 MG/DL    Bilirubin, total 0.7 0.2 - 1.0 MG/DL    ALT (SGPT) 51 16 - 61 U/L    AST (SGOT) 78 (H) 10 - 38 U/L    Alk. phosphatase 163 (H) 45 - 117 U/L    Protein, total 5.4 (L) 6.4 - 8.2 g/dL    Albumin 1.6 (L) 3.4 - 5.0 g/dL    Globulin 3.8 2.0 - 4.0 g/dL    A-G Ratio 0.4 (L) 0.8 - 1.7     CBC WITH AUTOMATED DIFF    Collection Time: 11/02/22 12:00 PM   Result Value Ref Range    WBC 3.7 (L) 4.6 - 13.2 K/uL    RBC 2.73 (L) 4.35 - 5.65 M/uL    HGB 8.3 (L) 13.0 - 16.0 g/dL    HCT 24.3 (L) 36.0 - 48.0 %    MCV 89.0 78.0 - 100.0 FL    MCH 30.4 24.0 - 34.0 PG    MCHC 34.2 31.0 - 37.0 g/dL    RDW 20.7 (H) 11.6 - 14.5 %    PLATELET 60 (L) 112 - 420 K/uL    NRBC 3.0 (H) 0  WBC    ABSOLUTE NRBC 0.11 (H) 0.00 - 0.01 K/uL    NEUTROPHILS 58 40 - 73 %    BAND NEUTROPHILS 11 %    LYMPHOCYTES 10 (L) 21 - 52 %    MONOCYTES 7 3 - 10 %    EOSINOPHILS 0 0 - 5 %    BASOPHILS 0 0 - 2 %    METAMYELOCYTES 12 %    PROMYELOCYTES 2 %    IMMATURE GRANULOCYTES 0 0.0 - 0.5 %    ABS. NEUTROPHILS 2.6 1.8 - 8.0 K/UL    ABS. LYMPHOCYTES 0.4 (L) 0.9 - 3.6 K/UL    ABS. MONOCYTES 0.3 0.05 - 1.2 K/UL    ABS. EOSINOPHILS 0.0 0.0 - 0.4 K/UL    ABS. BASOPHILS 0.0 0.0 - 0.1 K/UL    ABS. IMM.  GRANS. 0.0 0.00 - 0.04 K/UL    DF MANUAL      RBC COMMENTS NICHO CELLS  1+        RBC COMMENTS ANISOCYTOSIS  2+        RBC COMMENTS OVALOCYTES  2+        RBC COMMENTS ELLLIPTOCYES 1+    MAGNESIUM    Collection Time: 11/02/22 12:00 PM   Result Value Ref Range    Magnesium 1.6 1.6 - 2.6 mg/dL   D DIMER    Collection Time: 11/02/22 12:00 PM   Result Value Ref Range    D DIMER 14.90 (H) <0.46 ug/ml(FEU)   HEMOGLOBIN A1C WITH EAG    Collection Time: 11/02/22 12:00 PM   Result Value Ref Range    Hemoglobin A1c 7.6 (H) 4.2 - 5.6 %    Est. average glucose 171 mg/dL   POC LACTIC ACID    Collection Time: 11/02/22 12:09 PM   Result Value Ref Range    Lactic Acid (POC) 4.08 (HH) 0.40 - 2.00 mmol/L   GLUCOSE, POC    Collection Time: 11/02/22 12:25 PM   Result Value Ref Range    Glucose (POC) 314 (H) 70 - 110 mg/dL   GLUCOSE, POC    Collection Time: 11/02/22 12:26 PM   Result Value Ref Range    Glucose (POC) 316 (H) 70 - 110 mg/dL   BLOOD GAS, ARTERIAL POC    Collection Time: 11/02/22  1:12 PM   Result Value Ref Range    Device: NASAL CANNULA      FIO2 (POC) 2 %    pH (POC) 7.35 7.35 - 7.45      pCO2 (POC) 21.8 (L) 35.0 - 45.0 MMHG    pO2 (POC) 72 (L) 80 - 100 MMHG    HCO3 (POC) 12.2 (L) 22 - 26 MMOL/L    sO2 (POC) 95.0 92 - 97 %    Base deficit (POC) 12.4 mmol/L    Allens test (POC) Positive      Site LEFT RADIAL      Patient temp. 96.4      Specimen type (POC) ARTERIAL      Performed by Francisco Javier Garner    COVID-19 WITH INFLUENZA A/B    Collection Time: 11/02/22  1:46 PM   Result Value Ref Range    SARS-CoV-2 by PCR Detected (AA) NOTD      Influenza A by PCR Not detected NOTD      Influenza B by PCR Not detected NOTD     POC LACTIC ACID    Collection Time: 11/02/22  2:21 PM   Result Value Ref Range    Lactic Acid (POC) 1.49 0.40 - 2.00 mmol/L   GLUCOSE, POC    Collection Time: 11/02/22  2:21 PM   Result Value Ref Range    Glucose (POC) 368 (H) 70 - 110 mg/dL   URINALYSIS W/ RFLX MICROSCOPIC    Collection Time: 11/02/22  2:40 PM   Result Value Ref Range    Color YELLOW      Appearance CLEAR      Specific gravity 1.020 1.005 - 1.030      pH (UA) 5.5 5.0 - 8.0      Protein 100 (A) NEG mg/dL    Glucose 250 (A) NEG mg/dL    Ketone Negative NEG mg/dL    Bilirubin Negative NEG      Blood TRACE (A) NEG      Urobilinogen 0.2 0.2 - 1.0 EU/dL    Nitrites Negative NEG      Leukocyte Esterase Negative NEG     URINE MICROSCOPIC ONLY    Collection Time: 11/02/22  2:40 PM   Result Value Ref Range    WBC 0 to 3 0 - 5 /hpf    RBC 0 to 3 0 - 5 /hpf    Epithelial cells 1+ 0 - 5 /lpf    Bacteria FEW (A) NEG /hpf    Granular cast 0 to 3 NEG /lpf    Mixed Cell Cast 0 to 3 NEG /LPF   GLUCOSE, POC    Collection Time: 11/02/22  3:39 PM   Result Value Ref Range    Glucose (POC) 401 (HH) 70 - 110 mg/dL   GLUCOSE, POC    Collection Time: 11/02/22 4:53 PM   Result Value Ref Range    Glucose (POC) 131 (H) 70 - 110 mg/dL   GLUCOSE, POC    Collection Time: 11/02/22  5:39 PM   Result Value Ref Range    Glucose (POC) 356 (H) 70 - 110 mg/dL   TROPONIN-HIGH SENSITIVITY    Collection Time: 11/02/22  7:32 PM   Result Value Ref Range    Troponin-High Sensitivity 24 0 - 78 ng/L   GLUCOSE, POC    Collection Time: 11/02/22  9:56 PM   Result Value Ref Range    Glucose (POC) 65 (L) 70 - 110 mg/dL   GLUCOSE, POC    Collection Time: 11/02/22 10:08 PM   Result Value Ref Range    Glucose (POC) 56 (L) 70 - 110 mg/dL   GLUCOSE, POC    Collection Time: 11/02/22 10:50 PM   Result Value Ref Range    Glucose (POC) 165 (H) 70 - 110 mg/dL       Radiologic Studies -   DUPLEX LOWER EXT VENOUS BILAT   Final Result      CT CHEST ABD PELV WO CONT   Final Result      Extensive groundglass opacities throughout the lungs, may reflect atypical   infection or edema superimposed on or as pneumonia. No acute intra-abdominal abnormality. Trace ascites. XR CHEST PORT   Final Result      Bilateral interstitial and parenchymal opacities. NM LUNG PERFUSION W VENT    (Results Pending)   XR CHEST PORT    (Results Pending)     CT Results  (Last 48 hours)                 11/02/22 1530  CT CHEST ABD PELV WO CONT Final result    Impression:      Extensive groundglass opacities throughout the lungs, may reflect atypical   infection or edema superimposed on or as pneumonia. No acute intra-abdominal abnormality. Trace ascites. Narrative:  EXAM: CT chest, abdomen, and pelvis       INDICATION: Shortness of breath. COMPARISON: 8/27/2022       TECHNIQUE: Axial CT imaging of the chest, abdomen, and pelvis was performed   after IV contrast administration. Multiplanar reformats were generated.        One or more dose reduction techniques were used on this CT: automated exposure   control, adjustment of the mAs and/or kVp according to patient size, and   iterative reconstruction techniques. The specific techniques used on this CT   exam have been documented in the patient's electronic medical record. Digital   Imaging and Communications in Medicine (DICOM) format image data are available   to nonaffiliated external healthcare facilities or entities on a secure, media   free, reciprocally searchable basis with patient authorization for at least a   12-month period after this study. _______________       FINDINGS:       CHEST:       MEDIASTINUM: Heart size is normal. Small pericardial effusion. Normal caliber   aorta with vascular calcifications       LYMPH NODES: No pathologically enlarged mediastinal or hilar lymph nodes. PLEURA: No pleural effusion or pneumothorax. LUNGS/AIRWAY: Extensive bilateral parenchymal groundglass locations with   angulation. No evidence of honeycombing. OTHER: None. **       ABDOMEN/PELVIS:       Lack of intravenous contrast limits evaluation of abdominal viscera. LIVER, BILIARY: Liver is unremarkable. No abnormal biliary dilation. Gallbladder   is unremarkable. PANCREAS: Unremarkable. SPLEEN: Unremarkable. ADRENALS: Unremarkable. KIDNEYS: No hydronephrosis or renal calcification. Oliveira catheter within the   urinary bladder. LYMPH NODES: No pathologically enlarged lymph nodes. GASTROINTESTINAL TRACT: Prior right colectomy with right ileocolic anastomosis. No abnormal bowel dilation or wall thickening. PELVIC ORGANS: Unremarkable. VASCULATURE: Vascular calcifications. OSSEOUS: Numerous sclerotic foci throughout the axial skeleton possibly   representing bone islands. No area of erosion or aggressive-appearing bone   destruction. OTHER: Trace ascites. _______________                 CXR Results  (Last 48 hours)                 11/02/22 1236  XR CHEST PORT Final result    Impression:      Bilateral interstitial and parenchymal opacities. Narrative:  EXAM: XR CHEST PORT       CLINICAL INDICATION/HISTORY: meets SIRS criteria   -Additional: None       COMPARISON: 8/27/2022       TECHNIQUE: Frontal view of the chest       _______________       FINDINGS:       HEART AND MEDIASTINUM: Right chest wall port tip at the cavoatrial junction. Normal cardiac size and mediastinal contours. LUNGS AND PLEURAL SPACES: Bilateral interstitial and parenchymal opacities. No   focal pneumothorax or pleural effusion.        BONY THORAX AND SOFT TISSUES: No acute osseous abnormality       _______________                   Medications given in the ED-  Medications   sodium chloride (NS) flush 5-10 mL (has no administration in time range)   Vancomycin - Pharmacy to Dose (has no administration in time range)   NOREPINephrine (LEVOPHED) 8 mg in 5% dextrose 250mL (32 mcg/mL) infusion (8 mcg/min IntraVENous Rate Change 11/2/22 1818)   levoFLOXacin (LEVAQUIN) 750 mg in D5W IVPB (has no administration in time range)   piperacillin-tazobactam (ZOSYN) 4.5 g in 0.9% sodium chloride (MBP/ADV) 100 mL MBP (4.5 g IntraVENous New Bag 11/2/22 1956)   Vancomycin Random level due 11/3/22 at 14:00 (has no administration in time range)   ELECTROLYTE REPLACEMENT PROTOCOL - Potassium Renal Dosing (has no administration in time range)   ELECTROLYTE REPLACEMENT PROTOCOL - Magnesium  (has no administration in time range)   ELECTROLYTE REPLACEMENT PROTOCOL  - Phosphorus Renal Dosing (has no administration in time range)   ELECTROLYTE REPLACEMENT PROTOCOL - Calcium  (has no administration in time range)   insulin glargine (LANTUS) injection 12 Units ( SubCUTAneous Automatically Held 11/16/22 2200)   insulin lispro (HUMALOG) injection (0 Units SubCUTAneous Held 11/2/22 2200)   glucose chewable tablet 16 g (has no administration in time range)   glucagon (GLUCAGEN) injection 1 mg (has no administration in time range)   dextrose 10% infusion 0-250 mL (has no administration in time range) pantoprazole (PROTONIX) 40 mg in 0.9% sodium chloride 10 mL injection (40 mg IntraVENous Given 11/2/22 2236)   heparin (porcine) injection 5,000 Units ( SubCUTAneous Automatically Held 11/16/22 2000)   dextrose 5% and 0.9% NaCl infusion (75 mL/hr IntraVENous New Bag 11/2/22 2243)   sodium chloride 0.9 % bolus infusion 1,000 mL (0 mL IntraVENous IV Completed 11/2/22 1434)     Followed by   sodium chloride 0.9 % bolus infusion 836 mL (836 mL IntraVENous Bolus 11/2/22 1239)   vancomycin (VANCOCIN) 1250 mg in  ml infusion (1,250 mg IntraVENous New Bag 11/2/22 1501)   potassium chloride 10 mEq in 100 ml IVPB (10 mEq IntraVENous New Bag 11/2/22 1956)   magnesium sulfate 2 g/50 ml IVPB (premix or compounded) (2 g IntraVENous New Bag 11/2/22 1633)   insulin lispro (HUMALOG) injection 15 Units (15 Units SubCUTAneous Given 11/2/22 1826)       Procedures     Procedures    ED Course     I Raza Dumont MD) am the first provider for this patient. I reviewed the vital signs, available nursing notes, past medical history, past surgical history, family history and social history. Records Reviewed: Nursing Notes    Cardiac Monitor:  Rate: 92 bpm  Rhythm: sinus rhythm    Pulse Oximetry Analysis - 89% on RA; improving to 94% on 4 L/min by nasal cannula    EKG interpretation: (Preliminary)  Rhythm: Sinus rhythm. Rate: 92 bpm; no STEMI, QT/QTc: 396/489 ms  EKG read by Raza Dumont MD at 11:30 AM    11:40 AM Initial assessment performed. The patients presenting problems have been discussed, and they are in agreement with the care plan formulated and outlined with them. I have encouraged them to ask questions as they arise throughout their visit. 12:12 PM  Fluids WERE started started for SEPSIS/suspected SEPSIS.    12:12 PM  Antibiotics were started. 12:12 PM  Code Sepsis called.     SEPSIS REASSESSMENT NOTE:   2:15 PM    Visit Vitals  BP (!) 121/56   Pulse (!) 115   Temp 97.2 °F (36.2 °C)   Resp (!) 34   Ht 5' 7\" (1.702 m)   Wt 62 kg (136 lb 11 oz)   SpO2 100%   BMI 21.41 kg/m²       Iliana Jennings MD, has seen and reassessed the patient for SEPSISas follows:    Capillary Refill:normal/brisk  Cardiopulmonary Evaluation:   Lung Sounds: crackles   Cardiac Sounds: rapid rate, regular rhythm  Peripheral Pulses: present  Skin Exam: cool, turgor good      ED Course as of 11/02/22 2256   Wed Nov 02, 2022   1415 Discussed with Dr. Gabriel Boyd for ICU admission [JM]   [phone] Discussed with Dr. Patricia Gallagher for ICU admission, current access is a Port-A-Cath and a midline. Dr. Ruel Hatch will add additional lines if necessary. [JM]      ED Course User Index  [JM] Jaxon Ibarra MD         Medical Decision Making     Provider Notes (Medical Decision Making):   DDX: Hypoxia, electrolyte disorder, sepsis, pneumonia, COVID    Discussion:  [de-identified] y.o. male with acute worsening of subacute COVID-19. 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 He also has bilateral COVID-pneumonia, meets sepsis criteria and an CLAUS likely as a sequela of his COVID-pneumonia. Additionally the patient is hypoxic and requiring high flow oxygen. Discussed with intensivist and hospitalist for admission to the ICU. Family understands and agrees with the need for admission. Critical Care Time:   I have spent 99 minutes of critical care time involved in lab review, consultations with specialist, family decision-making, and documentation. This time does not include time spent on separately billable procedures. During this entire length of time I was immediately available to the patient. Critical Care: The reason for providing this level of medical care for this critically ill patient was due a critical illness that impaired one or more vital organ systems such that there was a high probability of imminent or life threatening deterioration in the patients condition.  This care involved high complexity decision making to assess, manipulate, and support vital system functions, to treat this degreee vital organ system failure and to prevent further life threatening deterioration of the patients condition. Treatment of hypoxia with high flow oxygen, assessment and treatment for sepsis from bilateral COVID-pneumonia with septic shock and CLAUS. Liyah Joshi MD    Diagnosis and Disposition     Critical Care Time: Minutes    Core Measures:  For Hospitalized Patients:    1. Hospitalization Decision Time:  The decision to hospitalize the patient was made by Nusrat Hairston MD, MD at 12:01 PM on 11/2/2022    2. Aspirin: Aspirin was not given because the patient did not present with a stroke at the time of their Emergency Department evaluation    2:38 PM Patient is being admitted to the hospital by Dr. Amina Jarrett. The results of their tests and reasons for their admission have been discussed with them and/or available family. They convey agreement and understanding for the need to be admitted and for their admission diagnosis. CONDITIONS ON ADMISSION:  Sepsis is present at the time of admission. Deep Vein Thrombosis is not present at the time of admission. Thrombosis is not present at the time of admission. Urinary Tract Infection is not present at the time of admission. Pneumonia is present at the time of admission. MRSA is not present at the time of admission. Wound infection is not present at the time of admission. Pressure Ulcer is not present at the time of admission. CLINICAL IMPRESSION:    1. Hypoxia    2. Hypokalemia    3. Sepsis with acute renal failure and septic shock, due to unspecified organism, unspecified acute renal failure type ([address]    4. Pneumonia of both lungs due to infectious organism, unspecified part of lung    5. CLAUS (acute kidney injury) ([address]    6. COVID-19 [address] [facility] [address], MD am the primary clinician of record.     Davey Disclaimer     Please note that this dictation was completed with [employer], the computer voice recognition software. Quite often unanticipated grammatical, syntax, homophones, and other interpretive errors are inadvertently transcribed by the computer software. Please disregard these errors. Please excuse any errors that have escaped final proofreading.     Rebecca Smith MD

## 2022-11-02 NOTE — ED NOTES
Patients MAP continuing to trend less than 65. MD made aware.  No new orders received at this time    Will notified MD of any changes to patient mental status , general appearance or if MAP continues to trend down ware      Mid-line requested for 2nd access Paramedic Jorden at the bedside at this time

## 2022-11-02 NOTE — CONSULTS
[facility]  Pulmonary, Critical Care, and Sleep Medicine    Name: Israel Rosenberg MRN: [id number]   : 1940 Hospital: [facility] [place]    Date: 2022  Room: [address] [place] [address] Note                                              Consult requesting physician: Dr. Girish Black  Reason for Consult: Acute hypoxic respiratory failure    IMPRESSION:   Acute hypoxic respiratory failure - J96.01  Septic shock - A41.9, R65.21  COVID19 pneumonia - U07.1, J12.82  Immunocompromised status - D84.9  Leukopenia - D72.819  Anemia - D64.9  Thrombocytopenia - D69.6  CLAUS on CKD - N17.9  Hypokalemia  DM - uncontrolled with hyperglycemia  Patient Active Problem List   Diagnosis Code    Hypokalemia E87.6    Hypothermia T68. 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 On HFNC via salter NC  CXR and CT chest show multilobar GGO  Keep SPO2 >=92%. Wean O2 flow as tolerated for goal SPO2  HOB 30 degree elevation all the time. Aggressive pulmonary toileting. Aspiration precautions. Incentive spirometry encouraged  Self proning when stable  Protecting airway  CXR for follow up in AM    CVS: actively titrate vasopressor for goal SBP > 100 mm Hg or MAP > 65 mm Hg  IVF: LR 1t 100 mL/hr  ECHO ordered   Normal cardiac troponin     ID: COVID19 pneumonia  Check Procalcitonin, LD  Lactic acid normal  Patient is out the window for Remdesivir or Paxlovid or monoclonal antibodies  Bl cx 22: in process  Check urine culture  Antibiotics: Zosyn, Levofloxacin, vancomycin - pharmacy to adjust dose per renal function  Sepsis bundle and protocol followed. Fluid resuscitation as needed. Follow cultures. Deescalate antibiotic when appropriate.     Hematology/Oncology: monitor CBC  Known to have chronic anemia  Thrombocytopenia likely related to chemorx  Any hematology consult as per clinical course    Renal: monitor renal function, lytes, UO  Replace K+ and recheck  Nephrology evaluation as per clinical course    GI/: PPI for GI prophylaxis  Diabetic diet     Endocrine: Monitor FSBS. SSI and Lantus    Neurology: AAO x 3, generalized weak - no sensory or motor neurological deficit in limited exam  Recent mobility issues from generalized weakness    Pain/Sedation: avoid sedative, opiate, hypnotics if possible    Skin/Wound: as per nursing care    Electrolytes: Replace electrolytes per ICU electrolyte replacement protocol. Prophylaxis: DVT Prophylaxis: SCD. GI Prophylaxis: PPI. Restraints: none    Lines/Tubes: PIV, midline  Has mediport  Oliveira: 11/2/22 (Medically necessary for strict input/output monitoring in critically ill patient, will remove it when not needed. Oliveira bundle followed). Advance Directive/Palliative Care: Consulted. Full code per GD. Will defer respective systems problem management to primary and other respective consultant and follow patient in ICU with primary and other medical team.  Further recommendations will be based on the patient's response to recommended treatment and results of the investigation ordered. Quality Care: PPI, DVT prophylaxis, HOB elevated, Infection control all reviewed and addressed. Care of plan d/w RN, RT, MDR, hospitalist team.  D/w patient, family-GD at listed phone # (answered all questions to satisfaction). High complexity decision making was performed during the evaluation of this patient at high risk for decompensation with multiple organ involvement. Total critical care time spent rendering care exclusive of procedures/family discussion/coordination of care: 60 minutes.         Subjective/History of Present Illness:     Patient is a [de-identified] y.o. 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 Vic Samayoa, anemia, HTN, HLD, CKD 3, DM presented to [facility] ER with persistent dyspnea, cough and chronic diarrhea. Patient is poor historian hence information received from GD. Per GD he is not feeling well \"for a while\", patient last month noted to have recurrent episodes of hypotension requiring his PCP to hold off, Magnesium was low; received hydration, blood transfusion and Magnesium through oncology infusion center. He developed cough, dyspnea, fatigue abt 3 weeks ago. He was taken to [facility] and dx with COVID19 infection and pneumonia per GD. There were few family members with [address] but no direct contact with patient per GD. After ER visit he was treated with steroid, antibiotics w/o improvement and was brought to [facility] ER. In ER, patient noted to be hypotensive, hypothermic. Labs showed elevated lactic acid requiring to start fluid, antibiotics and sepsis work up. He required Levophed support and admitted to ICU. The patient denies fever, chills, night sweats, chest pain, wheezing or hemoptysis, palpitations, syncope, orthopnea, edema or paroxysmal nocturnal dyspnea, HA, neck stiffness, photophobia, sore throat, sinus pain or discharge, dysuria, nausea, vomiting, abdominal pain, rash, adenopathy, leg swelling or calf tenderness, urethral discharge, bleeding anywhere. I/O last 24 hrs: No intake or output data in the 24 hours ending 11/02/22 1552      History taken from patient, EMR     Review of Systems:  A comprehensive review of systems was negative except for that written in the HPI. No Known Allergies   Past Medical History:   Diagnosis Date    Endocrine disease     Hypertension       History reviewed. No pertinent surgical history. Social History     Tobacco Use    Smoking status: Not on file    Smokeless tobacco: Not on file   Substance Use Topics    Alcohol use: Not on file      History reviewed. No pertinent family history. Prior to Admission medications    Medication Sig Start Date End Date Taking? Authorizing Provider   simvastatin (ZOCOR) 20 mg tablet Take  by mouth nightly. Ibrahima Langston MD   lisinopril (PRINIVIL, ZESTRIL) 20 mg tablet Take  by mouth daily. Ibrahima Langston MD   aspirin delayed-release 81 mg tablet Take  by mouth daily. Ibrahima Langston MD   metFORMIN (GLUCOPHAGE) 1,000 mg tablet Take 1,000 mg by mouth two (2) times daily (with meals). Ibrahima Langston MD     Current Facility-Administered Medications   Medication Dose Route Frequency    vancomycin (VANCOCIN) 1250 mg in  ml infusion  1,250 mg IntraVENous ONCE    Vancomycin - Pharmacy to Dose  1 Each Other Rx Dosing/Monitoring    NOREPINephrine (LEVOPHED) 8 mg in 5% dextrose 250mL (32 mcg/mL) infusion  0.5-16 mcg/min IntraVENous TITRATE    [START ON 2022] levoFLOXacin (LEVAQUIN) 750 mg in D5W IVPB  750 mg IntraVENous Q48H    piperacillin-tazobactam (ZOSYN) 4.5 g in 0.9% sodium chloride (MBP/ADV) 100 mL MBP  4.5 g IntraVENous Q8H    [START ON 11/3/2022] Vancomycin Random level due 11/3/22 at 14:00  1 Each Other ONCE         Objective:   Vital Signs:    Visit Vitals  BP (!) 107/50   Pulse 90   Temp (!) 91.8 °F (33.2 °C)   Resp 28   Ht 5' 7\" (1.702 m)   Wt 62 kg (136 lb 11 oz)   SpO2 94%   BMI 21.41 kg/m²       O2 Device: Hi flow nasal cannula   O2 Flow Rate (L/min): 4 l/min   Temp (24hrs), Av.8 °F (34.3 °C), Min:91.3 °F (32.9 °C), Max:96.5 °F (35.8 °C)       Intake/Output:   Last shift:      No intake/output data recorded. Last 3 shifts: No intake/output data recorded.     No intake or output data in the 24 hours ending 22 1552    Last 3 Recorded Weights in this Encounter    22 1133 22 1435   Weight: 61.2 kg (135 lb) 62 kg (136 lb 11 oz)       Recent Labs     22  1312   PHI 7.35   PCO2I 21.8*   PO2I 72*   HCO3I 12.2*   FIO2I 2       Physical Exam: Proper isolation precaution in place and may limit exam     General/Neurology: Alert, Awake, O x3, no sensory or motor neurological deficit in limited exam  Head:   Normocephalic, without obvious abnormality, atraumatic. Eye:   EOM intact. No scleral icterus, no cyanosis. Nose:   No sinus tenderness  Throat:  Lips, mucosa, and tongue normal. No oral thrush. Neck:   Supple, symmetric. No lymphadenopathy. Trachea midline  Lung: Moderate air entry bilateral equal. Few basal scattered rhonchi. No wheezing. No stridors. No prolongded expiration. No accessory muscle use. Heart:   Regular rate & rhythm. S1 S2 present. No murmur. No JVD. Abdomen:  Soft. NT. ND. +BS. No masses. Extremities:  No pedal edema. No cyanosis. No clubbing. Pulses: 2+ and symmetric in DP. Capillary refill: normal  Lymphatic:  No cervical or supraclavicular palpable lymphadenopathy. Musculoskeletal: No joint swelling. No tenderness. Skin:   Color, texture, turgor fair      Data:       Recent Results (from the past 24 hour(s))   TROPONIN-HIGH SENSITIVITY    Collection Time: 11/02/22 12:00 PM   Result Value Ref Range    Troponin-High Sensitivity 21 0 - 78 ng/L   METABOLIC PANEL, COMPREHENSIVE    Collection Time: 11/02/22 12:00 PM   Result Value Ref Range    Sodium 142 136 - 145 mmol/L    Potassium 3.2 (L) 3.5 - 5.5 mmol/L    Chloride 116 (H) 100 - 111 mmol/L    CO2 13 (L) 21 - 32 mmol/L    Anion gap 13 3.0 - 18 mmol/L    Glucose 300 (H) 74 - 99 mg/dL    BUN 38 (H) 7.0 - 18 MG/DL    Creatinine 1.79 (H) 0.6 - 1.3 MG/DL    BUN/Creatinine ratio 21 (H) 12 - 20      eGFR 38 (L) >60 ml/min/1.73m2    Calcium 7.5 (L) 8.5 - 10.1 MG/DL    Bilirubin, total 0.7 0.2 - 1.0 MG/DL    ALT (SGPT) 51 16 - 61 U/L    AST (SGOT) 78 (H) 10 - 38 U/L    Alk.  phosphatase 163 (H) 45 - 117 U/L    Protein, total 5.4 (L) 6.4 - 8.2 g/dL    Albumin 1.6 (L) 3.4 - 5.0 g/dL    Globulin 3.8 2.0 - 4.0 g/dL    A-G Ratio 0.4 (L) 0.8 - 1.7     CBC WITH AUTOMATED DIFF    Collection Time: 11/02/22 12:00 PM   Result Value Ref Range    WBC 3.7 (L) 4.6 - 13.2 K/uL    RBC 2.73 (L) 4.35 - 5.65 M/uL    HGB 8.3 (L) 13.0 - 16.0 g/dL    HCT 24.3 (L) 36.0 - 48.0 %    MCV 89.0 78.0 - 100.0 FL    MCH 30.4 24.0 - 34.0 PG    MCHC 34.2 31.0 - 37.0 g/dL    RDW 20.7 (H) 11.6 - 14.5 %    PLATELET 60 (L) 130 - 420 K/uL    NRBC 3.0 (H) 0  WBC    ABSOLUTE NRBC 0.11 (H) 0.00 - 0.01 K/uL    NEUTROPHILS 58 40 - 73 %    BAND NEUTROPHILS 11 %    LYMPHOCYTES 10 (L) 21 - 52 %    MONOCYTES 7 3 - 10 %    EOSINOPHILS 0 0 - 5 %    BASOPHILS 0 0 - 2 %    METAMYELOCYTES 12 %    PROMYELOCYTES 2 %    IMMATURE GRANULOCYTES 0 0.0 - 0.5 %    ABS. NEUTROPHILS 2.6 1.8 - 8.0 K/UL    ABS. LYMPHOCYTES 0.4 (L) 0.9 - 3.6 K/UL    ABS. MONOCYTES 0.3 0.05 - 1.2 K/UL    ABS. EOSINOPHILS 0.0 0.0 - 0.4 K/UL    ABS. BASOPHILS 0.0 0.0 - 0.1 K/UL    ABS. IMM. GRANS. 0.0 0.00 - 0.04 K/UL    DF MANUAL      RBC COMMENTS NICHO CELLS  1+        RBC COMMENTS ANISOCYTOSIS  2+        RBC COMMENTS OVALOCYTES  2+        RBC COMMENTS ELLLIPTOCYES 1+    MAGNESIUM    Collection Time: 11/02/22 12:00 PM   Result Value Ref Range    Magnesium 1.6 1.6 - 2.6 mg/dL   D DIMER    Collection Time: 11/02/22 12:00 PM   Result Value Ref Range    D DIMER 14.90 (H) <0.46 ug/ml(FEU)   POC LACTIC ACID    Collection Time: 11/02/22 12:09 PM   Result Value Ref Range    Lactic Acid (POC) 4.08 (HH) 0.40 - 2.00 mmol/L   GLUCOSE, POC    Collection Time: 11/02/22 12:25 PM   Result Value Ref Range    Glucose (POC) 314 (H) 70 - 110 mg/dL   GLUCOSE, POC    Collection Time: 11/02/22 12:26 PM   Result Value Ref Range    Glucose (POC) 316 (H) 70 - 110 mg/dL   BLOOD GAS, ARTERIAL POC    Collection Time: 11/02/22  1:12 PM   Result Value Ref Range    Device: NASAL CANNULA      FIO2 (POC) 2 %    pH (POC) 7.35 7.35 - 7.45      pCO2 (POC) 21.8 (L) 35.0 - 45.0 MMHG    pO2 (POC) 72 (L) 80 - 100 MMHG    HCO3 (POC) 12.2 (L) 22 - 26 MMOL/L    sO2 (POC) 95.0 92 - 97 %    Base deficit (POC) 12.4 mmol/L    Allens test (POC) Positive      Site LEFT RADIAL      Patient temp. 96.4      Specimen type (POC) ARTERIAL      Performed by Valeria Alberto    COVID-19 WITH INFLUENZA A/B    Collection Time: 11/02/22  1:46 PM   Result Value Ref Range    SARS-CoV-2 by PCR Detected (AA) NOTD      Influenza A by PCR Not detected NOTD      Influenza B by PCR Not detected NOTD     POC LACTIC ACID    Collection Time: 11/02/22  2:21 PM   Result Value Ref Range    Lactic Acid (POC) 1.49 0.40 - 2.00 mmol/L   GLUCOSE, POC    Collection Time: 11/02/22  2:21 PM   Result Value Ref Range    Glucose (POC) 368 (H) 70 - 110 mg/dL   URINALYSIS W/ RFLX MICROSCOPIC    Collection Time: 11/02/22  2:40 PM   Result Value Ref Range    Color YELLOW      Appearance CLEAR      Specific gravity 1.020 1.005 - 1.030      pH (UA) 5.5 5.0 - 8.0      Protein 100 (A) NEG mg/dL    Glucose 250 (A) NEG mg/dL    Ketone Negative NEG mg/dL    Bilirubin Negative NEG      Blood TRACE (A) NEG      Urobilinogen 0.2 0.2 - 1.0 EU/dL    Nitrites Negative NEG      Leukocyte Esterase Negative NEG     URINE MICROSCOPIC ONLY    Collection Time: 11/02/22  2:40 PM   Result Value Ref Range    WBC 0 to 3 0 - 5 /hpf    RBC 0 to 3 0 - 5 /hpf    Epithelial cells 1+ 0 - 5 /lpf    Bacteria FEW (A) NEG /hpf    Granular cast 0 to 3 NEG /lpf    Mixed Cell Cast 0 to 3 NEG /LPF   GLUCOSE, POC    Collection Time: 11/02/22  3:39 PM   Result Value Ref Range    Glucose (POC) 401 (HH) 70 - 110 mg/dL         Chemistry Recent Labs     11/02/22  1200   *      K 3.2*   *   CO2 13*   BUN 38*   CREA 1.79*   CA 7.5*   MG 1.6   AGAP 13   BUCR 21*   *   TP 5.4*   ALB 1.6*   GLOB 3.8   AGRAT 0.4*        Lactic Acid Lactic acid   Date Value Ref Range Status   08/27/2022 4.4 (HH) 0.4 - 2.0 MMOL/L Final     Comment:     CALLED TO AND CORRECTLY REPEATED BY:  SINDY MOORE ER ON 8/27/22 AT 1813 TO TAD       No results for input(s): LAC in the last 72 hours.      Liver Enzymes Protein, total   Date Value Ref Range Status   11/02/2022 5.4 (L) 6.4 - 8.2 g/dL Final     Albumin   Date Value Ref Range Status   11/02/2022 1.6 (L) 3.4 - 5.0 g/dL Final     Globulin   Date Value Ref Range Status   11/02/2022 3.8 2.0 - 4.0 g/dL Final     A-G Ratio   Date Value Ref Range Status   11/02/2022 0.4 (L) 0.8 - 1.7   Final     Alk. phosphatase   Date Value Ref Range Status   11/02/2022 163 (H) 45 - 117 U/L Final     Recent Labs     11/02/22  1200   TP 5.4*   ALB 1.6*   GLOB 3.8   AGRAT 0.4*   *        CBC w/Diff Recent Labs     11/02/22  1200   WBC 3.7*   RBC 2.73*   HGB 8.3*   HCT 24.3*   PLT 60*   GRANS 58   LYMPH 10*   EOS 0        Cardiac Enzymes No results found for: CPK, CK, CKMMB, CKMB, RCK3, CKMBT, CKNDX, CKND1, KIMBERLY, TROPT, TROIQ, BLANCO, TROPT, TNIPOC, BNP, BNPP     BNP No results found for: BNP, BNPP, XBNPT     Coagulation No results for input(s): PTP, INR, APTT, INREXT in the last 72 hours. Thyroid  No results found for: T4, T3U, TSH, TSHEXT    No results found for: T4     Urinalysis Lab Results   Component Value Date/Time    Color YELLOW 11/02/2022 02:40 PM    Appearance CLEAR 11/02/2022 02:40 PM    Specific gravity 1.020 11/02/2022 02:40 PM    pH (UA) 5.5 11/02/2022 02:40 PM    Protein 100 (A) 11/02/2022 02:40 PM    Glucose 250 (A) 11/02/2022 02:40 PM    Ketone Negative 11/02/2022 02:40 PM    Bilirubin Negative 11/02/2022 02:40 PM    Urobilinogen 0.2 11/02/2022 02:40 PM    Nitrites Negative 11/02/2022 02:40 PM    Leukocyte Esterase Negative 11/02/2022 02:40 PM    Epithelial cells 1+ 11/02/2022 02:40 PM    Bacteria FEW (A) 11/02/2022 02:40 PM    WBC 0 to 3 11/02/2022 02:40 PM    RBC 0 to 3 11/02/2022 02:40 PM        Micro  No results for input(s): SDES, CULT in the last 72 hours. No results for input(s): CULT in the last 72 hours. Culture data during this hospitalization.    All Micro Results       Procedure Component Value Units Date/Time    COVID-19 WITH INFLUENZA A/B [[id number]]  (Abnormal) Collected: 11/02/22 1346    Order Status: Completed Specimen: Nasopharyngeal Updated: 11/02/22 1448     SARS-CoV-2 by PCR Detected        Comment: Positive results are indicative of the presence of SARS-CoV-2. Clinical correlation with patient history and other diagnostic information is necessary to determine patient infection status. Positive results do not rule out bacterial infection or co-infection with other pathogens. CALLED TO AND CORRECTLY REPEATED BY:  ANJEL CALLAHAN ED 11/02/22 AT 1448 BY ANI          Influenza A by PCR Not detected        Influenza B by PCR Not detected        Comment: NOTE: Influenza A and Influenza B negative results should be considered presumptive in samples that have a positive SARS-CoV-2 result. Consider re-testing with an alternate FDA-approved test if clinically indicated. Testing was performed using gracy [employer]ize SARS-CoV-2 and Influenza A/B nucleic acid assay. This test is a multiplex Real-Time Reverse Transcriptase Polymerase Chain Reaction (RT-PCR) based in vitro diagnostic test intended for the qualitative detection of nucleic acids from SARS-CoV-2, Influenza A, and Influenza B in nasopharyngeal for use under the FDA's Emergency Use Authorization(EAU) only. Fact sheet for Patients: FindDrives.pl  Fact sheet for Healthcare Providers: FindDrives.pl         COVID-19 RAPID TEST [[id number]]     Order Status: Sent     CULTURE, BLOOD [[id number]] Collected: 11/02/22 1207    Order Status: Sent Specimen: Blood Updated: 11/02/22 1225    CULTURE, BLOOD [[id number]] Collected: 11/02/22 1207    Order Status: Sent Specimen: Blood Updated: 11/02/22 1225               CT CHEST ABD PELV WO CONT    Result Date: 11/2/2022  EXAM: CT chest, abdomen, and pelvis INDICATION: Shortness of breath. COMPARISON: 8/27/2022 TECHNIQUE: Axial CT imaging of the chest, abdomen, and pelvis was performed after IV contrast administration. Multiplanar reformats were generated.  One or more dose reduction techniques were used on this CT: automated exposure control, adjustment of the mAs and/or kVp according to patient size, and iterative reconstruction techniques. The specific techniques used on this CT exam have been documented in the patient's electronic medical record. Digital Imaging and Communications in Medicine (DICOM) format image data are available to nonaffiliated external healthcare facilities or entities on a secure, media free, reciprocally searchable basis with patient authorization for at least a 12-month period after this study. _______________ FINDINGS: CHEST: MEDIASTINUM: Heart size is normal. Small pericardial effusion. Normal caliber aorta with vascular calcifications LYMPH NODES: No pathologically enlarged mediastinal or hilar lymph nodes. PLEURA: No pleural effusion or pneumothorax. LUNGS/AIRWAY: Extensive bilateral parenchymal groundglass locations with angulation. No evidence of honeycombing. OTHER: None. ** ABDOMEN/PELVIS: Lack of intravenous contrast limits evaluation of abdominal viscera. LIVER, BILIARY: Liver is unremarkable. No abnormal biliary dilation. Gallbladder is unremarkable. PANCREAS: Unremarkable. SPLEEN: Unremarkable. ADRENALS: Unremarkable. KIDNEYS: No hydronephrosis or renal calcification. Oliveira catheter within the urinary bladder. LYMPH NODES: No pathologically enlarged lymph nodes. GASTROINTESTINAL TRACT: Prior right colectomy with right ileocolic anastomosis. No abnormal bowel dilation or wall thickening. PELVIC ORGANS: Unremarkable. VASCULATURE: Vascular calcifications. OSSEOUS: Numerous sclerotic foci throughout the axial skeleton possibly representing bone islands. No area of erosion or aggressive-appearing bone destruction. OTHER: Trace ascites. _______________     Extensive groundglass opacities throughout the lungs, may reflect atypical infection or edema superimposed on or as pneumonia. No acute intra-abdominal abnormality. Trace ascites.     XR CHEST PORT    Result Date: 11/2/2022  EXAM: XR CHEST PORT CLINICAL INDICATION/HISTORY: meets SIRS criteria -Additional: None COMPARISON: 8/27/2022 TECHNIQUE: Frontal view of the chest _______________ FINDINGS: HEART AND MEDIASTINUM: Right chest wall port tip at the cavoatrial junction. Normal cardiac size and mediastinal contours. LUNGS AND PLEURAL SPACES: Bilateral interstitial and parenchymal opacities. No focal pneumothorax or pleural effusion. BONY THORAX AND SOFT TISSUES: No acute osseous abnormality _______________     Bilateral interstitial and parenchymal opacities. Images report reviewed by me:  CT (Most Recent) (CT chest reviewed by me) Results from Hospital Encounter encounter on 11/02/22    CT CHEST ABD PELV WO CONT    Narrative  EXAM: CT chest, abdomen, and pelvis    INDICATION: Shortness of breath. COMPARISON: 8/27/2022    TECHNIQUE: Axial CT imaging of the chest, abdomen, and pelvis was performed  after IV contrast administration. Multiplanar reformats were generated. One or more dose reduction techniques were used on this CT: automated exposure  control, adjustment of the mAs and/or kVp according to patient size, and  iterative reconstruction techniques. The specific techniques used on this CT  exam have been documented in the patient's electronic medical record. Digital  Imaging and Communications in Medicine (DICOM) format image data are available  to nonaffiliated external healthcare facilities or entities on a secure, media  free, reciprocally searchable basis with patient authorization for at least a  12-month period after this study. _______________    FINDINGS:    CHEST:    MEDIASTINUM: Heart size is normal. Small pericardial effusion. Normal caliber  aorta with vascular calcifications    LYMPH NODES: No pathologically enlarged mediastinal or hilar lymph nodes. PLEURA: No pleural effusion or pneumothorax. LUNGS/AIRWAY: Extensive bilateral parenchymal groundglass locations with  angulation.  No evidence of honeycombing. OTHER: None. **    ABDOMEN/PELVIS:    Lack of intravenous contrast limits evaluation of abdominal viscera. LIVER, BILIARY: Liver is unremarkable. No abnormal biliary dilation. Gallbladder  is unremarkable. PANCREAS: Unremarkable. SPLEEN: Unremarkable. ADRENALS: Unremarkable. KIDNEYS: No hydronephrosis or renal calcification. Oliveira catheter within the  urinary bladder. LYMPH NODES: No pathologically enlarged lymph nodes. GASTROINTESTINAL TRACT: Prior right colectomy with right ileocolic anastomosis. No abnormal bowel dilation or wall thickening. PELVIC ORGANS: Unremarkable. VASCULATURE: Vascular calcifications. OSSEOUS: Numerous sclerotic foci throughout the axial skeleton possibly  representing bone islands. No area of erosion or aggressive-appearing bone  destruction. OTHER: Trace ascites. _______________    Impression  Extensive groundglass opacities throughout the lungs, may reflect atypical  infection or edema superimposed on or as pneumonia. No acute intra-abdominal abnormality. Trace ascites. CXR reviewed by me:  XR (Most Recent). CXR  reviewed by me and compared with previous CXR Results from Hospital Encounter encounter on 11/02/22    XR CHEST PORT    Narrative  EXAM: XR CHEST PORT    CLINICAL INDICATION/HISTORY: meets SIRS criteria  -Additional: None    COMPARISON: 8/27/2022    TECHNIQUE: Frontal view of the chest    _______________    FINDINGS:    HEART AND MEDIASTINUM: Right chest wall port tip at the cavoatrial junction. Normal cardiac size and mediastinal contours. LUNGS AND PLEURAL SPACES: Bilateral interstitial and parenchymal opacities. No  focal pneumothorax or pleural effusion. BONY THORAX AND SOFT TISSUES: No acute osseous abnormality    _______________    Impression  Bilateral interstitial and parenchymal opacities.          ·Please note: Voice-recognition software may have been used to generate this report, which may have resulted in some phonetic-based errors in grammar and contents. Even though attempts were made to correct all the mistakes, some may have been missed, and remained in the body of the document.       Stefani Wood MD  11/2/2022

## 2022-11-02 NOTE — Clinical Note
Status[de-identified] INPATIENT [101]   Type of Bed: Intensive Care [6]   Cardiac Monitoring Required?: Yes   Inpatient Hospitalization Certified Necessary for the Following Reasons: 2.  Patient admitted for Inpatient Only Procedure (Surgical)   Admitting Diagnosis: Pneumonia [107192]   Admitting Diagnosis: Septic shock [facility]) [[id number]]   Admitting Diagnosis: CLAUS (acute kidney injury) [facility]) [[id number]]   Admitting Diagnosis: Hypothermia [706313]   Admitting Diagnosis: Hypokalemia [432506]   Admitting Physician: Bonita Brown [[id number]]   Attending Physician: Bonita Brown [[id number]]   Estimated Length of Stay: 2 Midnights   Discharge Plan[de-identified] Home with Office Follow-up

## 2022-11-02 NOTE — ED NOTES
Pt has rectal temp of 91. 3. provider notified and aware. Bear hugger was increased up to 43 degrees C. More blankets were added on top of bear hugger at this time.

## 2022-11-02 NOTE — DIABETES MGMT
Diabetes/ Glycemic Control Plan of Care  Recommendations:   Lantus 12 units every 24 hours  (0.2 units/kg/day)    Assessment:  Consult received for this [de-identified] y.o. male with known h/o T2DM, admitted through ER with c/o SOB, diagnosed with Covid pneumonia 11 days ago. POC glucose 401 mg/dL. Patient treated with correctional Humalog in ICU. Patient manages at home with Lantus 22 units nightly + Humalog 15 units QAC + Trulicity weekly - RN confirmed with patient. Patient discussed with MD - elevated BG likely related to steroid therapy over the past week. Recommend starting with a weight-based dose of Lantus + correctional Humalog, titrating insulin based on blood sugar response. DX:   1. Hypoxia        2. Hypokalemia        3. Sepsis with acute renal failure and septic shock, due to unspecified organism, unspecified acute renal failure type ([facility] [address]        4. Pneumonia of both lungs due to infectious organism, unspecified part of lung        5. CLAUS (acute kidney injury) ([facility] [address]        6. COVID-19 long hauler           Fasting/ Morning blood glucose:   Lab Results   Component Value Date/Time    Glucose 300 (H) 11/02/2022 12:00 PM    Glucose (POC) 401 (HH) 11/02/2022 03:39 PM     IV Fluids containing dextrose: NOREPINephrine (LEVOPHED) 8 mg in 5% dextrose 250mL (32 mcg/mL) infusion  Steroids:  none    Blood glucose values: Within target range (70-180mg/dL):  NO  Current insulin orders:    Corrective Humalog Q6 hours - normal sensitivity scale  Total Daily Dose previous 24 hours = 10 units since admission    Plan/Goals:   Blood glucose will be within target of 70 - 180 mg/dl within 72 hours  Reinforce dietary and medication compliance at home.        Education:  [] Refer to Diabetes Education Record                       [x] Education not indicated at this time       Janice Pelaez RN, BSN, Josr Paez  Professional   Glycemic Control Team   Phone:  [phone]  Tues - Thurs 8:30 - 4:30

## 2022-11-02 NOTE — PROGRESS NOTES
Pharmacy Renal Dosing Services:       Zosyn and Levofloxacin were automatically dose-adjusted per [facility] P[facility]T [facility] Protocol, with respect to renal function. Consult provided for this   [de-identified] y.o. , male , for the indication of HCAP. Dose adjusted to:  Zosyn 4.5 grams IV q8h  (each dose over 30 minutes)   Dose adjusted to:  Levofloxacin 750 mg IV q48h    Pt Weight:   Wt Readings from Last 1 Encounters:   11/02/22 62 kg (136 lb 11 oz)     Previous Regimen    Zosyn 4.5 grams IV q6h     Levofloxacin 750 mg IV q24h   Serum Creatinine Lab Results   Component Value Date/Time    Creatinine 1.79 (H) 11/02/2022 12:00 PM       Creatinine Clearance Estimated Creatinine Clearance: 28.9 mL/min (A) (based on SCr of 1.79 mg/dL (H)). BUN Lab Results   Component Value Date/Time    BUN 38 (H) 11/02/2022 12:00 PM         Pharmacy to continue to monitor patient daily. Will make dosage adjustments based upon changing renal function.   Signed Alethea Li information:  [phone]

## 2022-11-02 NOTE — ED NOTES
Bear hugger applied at this time. Skin probe repeat temp stated it was 95.4. Provider notified and aware. Per provider to place warmer on pt. Jorden medic and Jaz Mcconnell medic at the pts bedside at this time trying to place a midline. Will get rectal temp once they are finished.

## 2022-11-02 NOTE — PROGRESS NOTES
[address] [facility] Pharmacokinetic Monitoring Service - Vancomycin     Phil Wolfe is a [de-identified] y.o. male starting on vancomycin therapy for HCAP. Pharmacy consulted by Dr. Durga Kim for monitoring and adjustment. Target Concentration: Dosing based on anticipated concentration <15 mg/L due to renal impairment/insufficiency    Additional Antimicrobials:   Zosyn / levofloxacin    Pertinent Laboratory Values: Wt Readings from Last 1 Encounters:   11/02/22 61.2 kg (135 lb)     Temp Readings from Last 1 Encounters:   11/02/22 (!) 96.5 °F (35.8 °C)     No components found for: PROCAL  Estimated Creatinine Clearance: 28.5 mL/min (A) (based on SCr of 1.79 mg/dL (H)). Recent Labs     11/02/22  1200   WBC 3.7*       Pertinent Cultures:  Culture Date Source Results   11/2/22 blood In process       Plan:  Concentration-guided dosing due to renal impairment/insufficiency  Start Vancomycin 1250 mg IV once on 11/2/22 at ~ 12:15  Renal labs as indicated   Vancomycin Random level to be ordered ~ 24 hours after dose first dose.   Pharmacy will continue to monitor patient and adjust therapy as indicated    Thank you for the consult,  NIKI Rojas  11/2/2022 1:38 PM

## 2022-11-02 NOTE — H&P
History & Physical    Patient: Sandip Leon MRN: [id number]  CSN: [id number]    YOB: 1940  Age: [de-identified] y.o. Sex: male      DOA: 11/2/2022    Chief Complaint:   Chief Complaint   Patient presents with    Shortness of Breath          HPI:     Sandip Leon is a [de-identified] y.o.  male who with history of hypertension, metastatic prostate cancer, colon cancer status postresection presents to the emergency room with complaints of dyspnea over the last 2 days. Patient was recently hospitalized 11 days ago with COVID-pneumonia and was treated with steroids and had been doing in general well until 2 days ago. Over the last 2 days patient has been in generally weak and ambulatory and able to ambulate and perform daily duties. In the emergency room he was found to be hypotensive and severely hypoxic  And patient was also in rapid ventricular response A. Fib  He was started on sepsis bundle and Levophed peripherally with admission to the ICU. Past Medical History:   Diagnosis Date    Endocrine disease     Hypertension        History reviewed. No pertinent surgical history. History reviewed. No pertinent family history. Social History     Socioeconomic History    Marital status:        Prior to Admission medications    Medication Sig Start Date End Date Taking? Authorizing Provider   simvastatin (ZOCOR) 20 mg tablet Take  by mouth nightly. Ibrahima Langston MD   lisinopril (PRINIVIL, ZESTRIL) 20 mg tablet Take  by mouth daily. Ibrahima Langston MD   aspirin delayed-release 81 mg tablet Take  by mouth daily. Ibrahima Langston MD   metFORMIN (GLUCOPHAGE) 1,000 mg tablet Take 1,000 mg by mouth two (2) times daily (with meals). Ibrahima Langston MD       No Known Allergies      Review of Systems  GENERAL: Patient alert, awake and oriented times 3, able to communicate full sentences and not in distress. HEENT: No change in vision, no earache, tinnitus, sore throat or sinus congestion. NECK: No pain or stiffness. PULMONARY: +shortness of breath, cough or wheeze. Cardiovascular: no pnd or orthopnea, no CP  GASTROINTESTINAL: No abdominal pain, nausea, vomiting or diarrhea, melena or bright red blood per rectum. GENITOURINARY: No urinary frequency, urgency, hesitancy or dysuria. MUSCULOSKELETAL+ joint or muscle pain, no back pain, no recent trauma. DERMATOLOGIC: No rash, no itching, no lesions. ENDOCRINE: No polyuria, polydipsia, no heat or cold intolerance. No recent change in weight. +anemia or easy bruising or bleeding. NEUROLOGIC: No headache, seizures, numbness, tingling or weakness. Physical Exam:     Physical Exam:  Visit Vitals  BP (!) 107/49   Pulse 79   Temp (!) 91.3 °F (32.9 °C)   Resp 19   Ht 5' 7\" (1.702 m)   Wt 61.2 kg (135 lb)   SpO2 98%   BMI 21.14 kg/m²    O2 Flow Rate (L/min): 4 l/min O2 Device: Nasal cannula    Temp (24hrs), Av.4 °F (34.7 °C), Min:91.3 °F (32.9 °C), Max:96.5 °F (35.8 °C)    No intake/output data recorded. No intake/output data recorded. General:  Alert, cooperative, no distress, appears stated age. Head: Normocephalic, without obvious abnormality, atraumatic. Eyes:  Conjunctivae/corneas clear. PERRL, EOMs intact. Nose: Nares normal. No drainage or sinus tenderness. Neck: Supple, symmetrical, trachea midline, no adenopathy, thyroid: no enlargement, no carotid bruit and no JVD. Lungs:   Clear to auscultation bilaterally. Heart:  Regular rate and rhythm, S1, S2 normal.     Abdomen: Soft, non-tender. Bowel sounds normal.    Extremities: Extremities normal, atraumatic, no cyanosis or edema. Pulses: 2+ and symmetric all extremities. Skin:  No rashes or lesions   Neurologic: AAOx3, No focal motor or sensory deficit. Labs Reviewed: All lab results for the last 24 hours reviewed.    and EKG    Procedures/imaging: see electronic medical records for all procedures/Xrays and details which were not copied into this note but were reviewed prior to creation of Plan      Assessment/Plan     Active Problems:    Hypokalemia (11/2/2022)      Hypothermia (11/2/2022)      Pneumonia (11/2/2022)      CLAUS (acute kidney injury) ([facility] [place] [address] (11/2/2022)      Septic shock ([facility] [place] [address] (11/2/2022)    Back to mobile    DVT/GI Prophylaxis: Hep SQ    Cardiovascular  Septic shock  Trend troponins  Start Levophed titrate for MAP of 65  Stat EKG and echo  Patient is a full code palliative care consult    Respiratory:  Hypoxic respiratory failure  On sepsis antibiotics de-escalate as tolerated   I am concerned about pulmonary embolism   A VQ scan was ordered   Duplex scan of lower extremity ordered     Hematology:   Metastatic prostate cancer history of colon cancer   Thrombocytopenia   Hematology consult     :   CLAUS hold all hypertensive agents not on fluids     Endocrine:   Started on Lantus and sliding scale insulin hyperglycemia post steroids for 10 days from COVID hold steroid therapy     Infectious disease:   Respiratory viral panel pending   COVID is still positive   ID consult     Palliative care consult full code  De-escalate antibiotics out of window for any antiviral therapy or monoclonal antibody 1      Kim Davis MD  11/2/2022 2:28 PM

## 2022-11-02 NOTE — PROGRESS NOTES
Patient arrived to unit with ER RN from CT scan. Patient on non-rebreather and switched to 15 liters high flow nasal cannula, O2 SAT 99%. Levophed dose at 6mcg. Patient alert and oriented. Blood sugar 401 and Dr. Ryanne Dennis notified and orders provided and completed by RN. Patient resting quietly in bed. Family updated on patient's status and told no visitors allowed with isolation status. Family acknowledged and understood.

## 2022-11-02 NOTE — PROGRESS NOTES
Spoke with on-call Nuc Med about the STAT V/Q scan to be completed. Scan was ordered STAT at 1430. Nuc Med stated it can be done around [id number].

## 2022-11-02 NOTE — ROUTINE PROCESS
RRT CODE SEPSIS CALLED 1214  [facility]  RN Shahab Nunez    1ST LACTIC @ 2283 RESULTED 1212=4.08  BLOOD CULTURES AT 0372    PT WT. 61.2KG*[id number]=[id number] IVF--1ST BAG NS @ 1214  2ND BAG NS @ 1239  TOTAL 1836ML ENDED AT 1434    ABX ZOSYN IV @ 5483    2ND LACTIC PULLED AT 1421--1.49    LEVO STARTED AT 1348 AFTER:  SB=90 MAP 58  SB=88 MAP 52    PT ADMITTED TO ICU

## 2022-11-02 NOTE — ED TRIAGE NOTES
Pt came EMS. Pt was having SOB. Pt states he was dx with covid/pneumonia x 11 days ago.  Ran out of antibiotics x 2 days ago

## 2022-11-03 NOTE — PROGRESS NOTES
P[facility] Specialists  Pulmonary, Critical Care, and Sleep Medicine    Name: Keith Stiles MRN: [id number]   : 1940 Hospital: [facility] [place]    Date: 11/3/2022  Room: [address] [place] [address] Note                                              Consult requesting physician: Dr. Tori Quesada  Reason for Consult: Acute hypoxic respiratory failure    IMPRESSION:   Acute hypoxic respiratory failure - J96.01  Septic shock - A41.9, R65.21  COVID19 pneumonia - U07.1, J12.82  Immunocompromised status - D84.9  Leukopenia - D72.819  Anemia - D64.9  Thrombocytopenia - D69.6  LCAUS on CKD - N17.9  Hypokalemia  DM - uncontrolled with hyperglycemia  Patient Active Problem List   Diagnosis Code    Hypokalemia E87.6    Hypothermia T68. 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 Initially required HFNC via salter NC  CXR and CT chest showed multilobar GGO  Keep SPO2 >=92%. On O2 via NC at 4 Lpm. Wean O2 flow as tolerated for goal SPO2  HOB 30 degree elevation all the time. Aggressive pulmonary toileting. Aspiration precautions. Incentive spirometry encouraged  Self proning when stable  Protecting airway  CXR for follow up in AM 11/3/22 - stable    CVS: actively titrate vasopressor for goal SBP > 100 mm Hg or MAP > 65 mm Hg  Normal cardiac troponin   ECHO result pending    ID: COVID19 pneumonia  Elevated Procalcitonin, LD  Lactic acid normal  Patient is out the window for Remdesivir or Paxlovid or monoclonal antibodies  Bl cx 22: NGTD  Urine culture 22: NGTD  Antibiotics: Zosyn, Levofloxacin, vancomycin - pharmacy to adjust dose per renal function  Sepsis bundle and protocol followed. Fluid resuscitation as needed. Follow cultures.  Deescalate antibiotic when appropriate per ID. Hematology/Oncology: monitor CBC  Known to have chronic anemia  Transfused PRBC x 1 today to keep Hgb > 7 goal  Thrombocytopenia likely related to chemorx  Any hematology consult as per clinical course    Renal: monitor renal function, lytes, UO  S. Na+ elevated - avoid NS or 1/2 NS  Nephrology evaluation as per clinical course    GI/: PPI for GI prophylaxis  Diabetic diet as tolerated    Endocrine: Monitor FSBS. SSI and Lantus  Insulin stacking by staff may have resulted in episodes of hypoglycemia. Education provided to staff    Neurology: AAO x 3, generalized weak - no sensory or motor neurological deficit in limited exam  Recent mobility issues from generalized weakness    Pain/Sedation: avoid sedative, opiate, hypnotics if possible    Skin/Wound: as per nursing care    Electrolytes: Replace electrolytes per ICU electrolyte replacement protocol. Prophylaxis: DVT Prophylaxis: SCD. GI Prophylaxis: PPI. Restraints: none    Lines/Tubes: PIV, midline  Has mediport  Oliveira: 11/2/22 (Medically necessary for strict input/output monitoring in critically ill patient, will remove it when not needed. Oliveira bundle followed). Advance Directive/Palliative Care: Consulted. Full code per GD. Will defer respective systems problem management to primary and other respective consultant and follow patient in ICU with primary and other medical team.  Further recommendations will be based on the patient's response to recommended treatment and results of the investigation ordered. Quality Care: PPI, DVT prophylaxis, HOB elevated, Infection control all reviewed and addressed. Care of plan d/w RN, RT, MDR, hospitalist team.  D/w patient (answered all questions to satisfaction). High complexity decision making was performed during the evaluation of this patient at high risk for decompensation with multiple organ involvement.     Total critical care time spent rendering care exclusive of procedures/family discussion/coordination of care: 36 minutes. Subjective/History of Present Illness:     Patient is a [de-identified] y.o. 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 Cleopatra Levin, anemia, HTN, HLD, CKD 3, DM presented to [facility] ER with persistent dyspnea, cough and chronic diarrhea. Patient is poor historian hence information received from GD. Per GD he is not feeling well \"for a while\", patient last month noted to have recurrent episodes of hypotension requiring his PCP to hold off, Magnesium was low; received hydration, blood transfusion and Magnesium through oncology infusion center. He developed cough, dyspnea, fatigue abt 3 weeks ago. He was taken to [facility] and dx with COVID19 infection and pneumonia per GD. There were few family members with Chadd Pleitez but no direct contact with patient per GD. After ER visit he was treated with steroid, antibiotics w/o improvement and was brought to [facility] ER. In ER, patient noted to be hypotensive, hypothermic. Labs showed elevated lactic acid requiring to start fluid, antibiotics and sepsis work up. He required Levophed support and admitted to ICU. The patient denies fever, chills, night sweats, chest pain, wheezing or hemoptysis, palpitations, syncope, orthopnea, edema or paroxysmal nocturnal dyspnea, HA, neck stiffness, photophobia, sore throat, sinus pain or discharge, dysuria, nausea, vomiting, abdominal pain, rash, adenopathy, leg swelling or calf tenderness, urethral discharge, bleeding anywhere. 11/03/22:   Patient seen at bedside in ICU room #103  Reports improving dyspnea and now on nasal cannula at 4 L/min  The patient reports stable dry cough and denies phlegm, chest pain, fever, chills, wheezing or hemoptysis. Actively titrated on Levophed for blood pressure support  Patient denies palpitations, syncope, orthopnea, edema or paroxysmal nocturnal dyspnea.    Finished PRBC transfusion earlier today  No evidence for bleeding anywhere  Reports still low appetite  Fair urine output  [facility] was not contacted by staff on anything about patient overnight. I/O last 24 hrs: Intake/Output Summary (Last 24 hours) at 11/3/2022 1622  Last data filed at 11/3/2022 1307  Gross per 24 hour   Intake 3297.5 ml   Output 1420 ml   Net 1877.5 ml         History taken from patient, EMR     Review of Systems:  General ROS: negative for  - fever, chills, weight loss, fatigue and malaise  Cardiovascular ROS: negative for - chest pain, edema, murmur, orthopnea, palpitations or pnd  Gastrointestinal ROS: no nausea, vomiting, abdominal pain or black or bloody stools  Genito-Urinary ROS: no dysuria, trouble voiding, or hematuria  Dermatological ROS: negative for - pruritus, rash or skin lesion changes       No Known Allergies   Past Medical History:   Diagnosis Date    Endocrine disease     Hypertension       History reviewed. No pertinent surgical history. Social History     Tobacco Use    Smoking status: Not on file    Smokeless tobacco: Not on file   Substance Use Topics    Alcohol use: Not on file      History reviewed. No pertinent family history. Prior to Admission medications    Medication Sig Start Date End Date Taking? Authorizing Provider   simvastatin (ZOCOR) 20 mg tablet Take  by mouth nightly. Ibrahima Langston MD   lisinopril (PRINIVIL, ZESTRIL) 20 mg tablet Take  by mouth daily. Ibrahima Langston MD   aspirin delayed-release 81 mg tablet Take  by mouth daily. Ibrahima Langston MD   metFORMIN (GLUCOPHAGE) 1,000 mg tablet Take 1,000 mg by mouth two (2) times daily (with meals).     Ibrahima Langston MD     Current Facility-Administered Medications   Medication Dose Route Frequency    sodium chloride (23.4%) 0.9 % in dextrose 10% 1,040 mL infusion   IntraVENous CONTINUOUS    insulin lispro (HUMALOG) injection   SubCUTAneous AC&HS    insulin glargine (LANTUS) injection 12 Units  0.2 Units/kg SubCUTAneous QHS    [START ON 2022] Vancomycin Random level due 22 at 0400  1 Each Other ONCE    vancomycin (VANCOCIN) 750 mg in 0.9% sodium chloride 250 mL (VIAL-MATE)  750 mg IntraVENous Q24H    piperacillin-tazobactam (ZOSYN) 3.375 g in 0.9% sodium chloride (MBP/ADV) 100 mL MBP  3.375 g IntraVENous Q8H    [START ON 2022] levoFLOXacin (LEVAQUIN) 750 mg in D5W IVPB  750 mg IntraVENous Q48H    Vancomycin - Pharmacy to Dose  1 Each Other Rx Dosing/Monitoring    NOREPINephrine (LEVOPHED) 8 mg in 5% dextrose 250mL (32 mcg/mL) infusion  0.5-16 mcg/min IntraVENous TITRATE    pantoprazole (PROTONIX) 40 mg in 0.9% sodium chloride 10 mL injection  40 mg IntraVENous Q12H    [Held by provider] heparin (porcine) injection 5,000 Units  5,000 Units SubCUTAneous Q8H         Objective:   Vital Signs:    Visit Vitals  BP (!) 124/53   Pulse 100   Temp 97.5 °F (36.4 °C)   Resp (!) 32   Ht 5' 7\" (1.702 m)   Wt 61.2 kg (135 lb)   SpO2 99%   BMI 21.14 kg/m²       O2 Device: Nasal cannula   O2 Flow Rate (L/min): 4 l/min   Temp (24hrs), Av.8 °F (36 °C), Min:94.3 °F (34.6 °C), Max:98.2 °F (36.8 °C)       Intake/Output:   Last shift:      701 - 1900  In: 1026.1 [P.O.:250;  I.V.:476.1]  Out: 800 [Urine:800]    Last 3 shifts: 1901 -  07  In: 2295.7 [I.V.:2295.7]  Out: 820 [Urine:820]      Intake/Output Summary (Last 24 hours) at 11/3/2022 1622  Last data filed at 11/3/2022 1307  Gross per 24 hour   Intake 3297.5 ml   Output 1420 ml   Net 1877.5 ml       Last 3 Recorded Weights in this Encounter    22 1133 22 1435 22 0910   Weight: 61.2 kg (135 lb) 62 kg (136 lb 11 oz) 61.2 kg (135 lb)       Recent Labs     22  1312   PHI 7.35   PCO2I 21.8*   PO2I 72*   HCO3I 12.2*   FIO2I 2         Physical Exam: Proper isolation precaution in place and may limit exam     General: in no respiratory distress, cooperative, appears stated age, chronically ill looking, on O2 via NC  HEENT: PERRL, throat normal without erythema or exudate  Neck: No abnormally enlarged lymph nodes or thyroid, supple  Chest: normal  Lungs: moderate air entry, scattered basal rhonchi bilaterally, no tenderness/ rash  Heart: Regular rate and rhythm, S1S2 present, or without murmur or extra heart sounds  Abdomen: non-distended, bowel sounds normoactive, tympanic, abdomen is soft without significant tenderness, masses, organomegaly or guarding, rigidity, rebound  Extremity: no LE edema, no cyanosis; peripheral pulses 2+ and symmetric, capillary refill normal BL  Neuro: alert, oriented x 3, no defects noted in general exam.  Skin: Skin color, texture, turgor unchanged       Data:       Recent Results (from the past 24 hour(s))   GLUCOSE, POC    Collection Time: 11/02/22  4:53 PM   Result Value Ref Range    Glucose (POC) 131 (H) 70 - 110 mg/dL   GLUCOSE, POC    Collection Time: 11/02/22  5:39 PM   Result Value Ref Range    Glucose (POC) 356 (H) 70 - 110 mg/dL   PERIPHERAL SMEAR    Collection Time: 11/02/22  7:00 PM   Result Value Ref Range    PERIPHERAL SMEAR (NOTE)    TROPONIN-HIGH SENSITIVITY    Collection Time: 11/02/22  7:32 PM   Result Value Ref Range    Troponin-High Sensitivity 24 0 - 78 ng/L   GLUCOSE, POC    Collection Time: 11/02/22  9:56 PM   Result Value Ref Range    Glucose (POC) 65 (L) 70 - 110 mg/dL   GLUCOSE, POC    Collection Time: 11/02/22 10:08 PM   Result Value Ref Range    Glucose (POC) 56 (L) 70 - 110 mg/dL   GLUCOSE, POC    Collection Time: 11/02/22 10:50 PM   Result Value Ref Range    Glucose (POC) 165 (H) 70 - 110 mg/dL   GLUCOSE, POC    Collection Time: 11/02/22 11:19 PM   Result Value Ref Range    Glucose (POC) 104 70 - 110 mg/dL   GLUCOSE, POC    Collection Time: 11/03/22 12:33 AM   Result Value Ref Range    Glucose (POC) 59 (L) 70 - 110 mg/dL   GLUCOSE, POC    Collection Time: 11/03/22 12:35 AM   Result Value Ref Range    Glucose (POC) 64 (L) 70 - 110 mg/dL   GLUCOSE, POC    Collection Time: 11/03/22  1:20 AM Result Value Ref Range    Glucose (POC) 164 (H) 70 - 110 mg/dL   GLUCOSE, POC    Collection Time: 11/03/22  2:00 AM   Result Value Ref Range    Glucose (POC) 122 (H) 70 - 110 mg/dL   GLUCOSE, POC    Collection Time: 11/03/22  2:59 AM   Result Value Ref Range    Glucose (POC) 87 70 - 110 mg/dL   GLUCOSE, POC    Collection Time: 11/03/22  4:01 AM   Result Value Ref Range    Glucose (POC) 78 70 - 110 mg/dL   GLUCOSE, POC    Collection Time: 11/03/22  5:28 AM   Result Value Ref Range    Glucose (POC) 60 (L) 70 - 110 mg/dL   MAGNESIUM    Collection Time: 11/03/22  5:39 AM   Result Value Ref Range    Magnesium 1.8 1.6 - 2.6 mg/dL   PROCALCITONIN    Collection Time: 11/03/22  5:39 AM   Result Value Ref Range    Procalcitonin 2.65 ng/mL   LD    Collection Time: 11/03/22  5:39 AM   Result Value Ref Range     (H) 81 - 234 U/L   CBC WITH AUTOMATED DIFF    Collection Time: 11/03/22  5:39 AM   Result Value Ref Range    WBC 5.0 4.6 - 13.2 K/uL    RBC 2.19 (L) 4.35 - 5.65 M/uL    HGB 6.6 (L) 13.0 - 16.0 g/dL    HCT 19.8 (L) 36.0 - 48.0 %    MCV 90.4 78.0 - 100.0 FL    MCH 30.1 24.0 - 34.0 PG    MCHC 33.3 31.0 - 37.0 g/dL    RDW 20.9 (H) 11.6 - 14.5 %    PLATELET 65 (L) 588 - 420 K/uL    NRBC 2.4 (H) 0  WBC    ABSOLUTE NRBC 0.12 (H) 0.00 - 0.01 K/uL    NEUTROPHILS 77 (H) 40 - 73 %    LYMPHOCYTES 12 (L) 21 - 52 %    MONOCYTES 8 3 - 10 %    EOSINOPHILS 1 0 - 5 %    BASOPHILS 0 0 - 2 %    IMMATURE GRANULOCYTES 2 (H) 0.0 - 0.5 %    ABS. NEUTROPHILS 3.8 1.8 - 8.0 K/UL    ABS. LYMPHOCYTES 0.6 (L) 0.9 - 3.6 K/UL    ABS. MONOCYTES 0.4 0.05 - 1.2 K/UL    ABS. EOSINOPHILS 0.1 0.0 - 0.4 K/UL    ABS. BASOPHILS 0.0 0.0 - 0.1 K/UL    ABS. IMM.  GRANS. 0.1 (H) 0.00 - 0.04 K/UL    DF AUTOMATED      PLATELET COMMENTS DECREASED PLATELETS      RBC COMMENTS ANISOCYTOSIS  2+        RBC COMMENTS NICHO CELLS  1+        RBC COMMENTS SCHISTOCYTES  1+        WBC COMMENTS ELLIPTOCTYES 1+    HEMOGLOBIN A1C WITH EAG    Collection Time: 11/03/22 5:39 AM   Result Value Ref Range    Hemoglobin A1c 7.8 (H) 4.2 - 5.6 %    Est. average glucose 507 mg/dL   METABOLIC PANEL, COMPREHENSIVE    Collection Time: 11/03/22  5:39 AM   Result Value Ref Range    Sodium 146 (H) 136 - 145 mmol/L    Potassium 4.5 3.5 - 5.5 mmol/L    Chloride 122 (H) 100 - 111 mmol/L    CO2 15 (L) 21 - 32 mmol/L    Anion gap 9 3.0 - 18 mmol/L    Glucose 73 (L) 74 - 99 mg/dL    BUN 32 (H) 7.0 - 18 MG/DL    Creatinine 1.67 (H) 0.6 - 1.3 MG/DL    BUN/Creatinine ratio 19 12 - 20      eGFR 41 (L) >60 ml/min/1.73m2    Calcium 7.7 (L) 8.5 - 10.1 MG/DL    Bilirubin, total 0.6 0.2 - 1.0 MG/DL    ALT (SGPT) 37 16 - 61 U/L    AST (SGOT) 60 (H) 10 - 38 U/L    Alk. phosphatase 131 (H) 45 - 117 U/L    Protein, total 5.5 (L) 6.4 - 8.2 g/dL    Albumin 1.3 (L) 3.4 - 5.0 g/dL    Globulin 4.2 (H) 2.0 - 4.0 g/dL    A-G Ratio 0.3 (L) 0.8 - 1.7     GLUCOSE, POC    Collection Time: 11/03/22  6:22 AM   Result Value Ref Range    Glucose (POC) 178 (H) 70 - 110 mg/dL   HGB & HCT    Collection Time: 11/03/22  6:40 AM   Result Value Ref Range    HGB 6.5 (L) 13.0 - 16.0 g/dL    HCT 19.1 (L) 36.0 - 48.0 %   RBC, ALLOCATE    Collection Time: 11/03/22  7:15 AM   Result Value Ref Range    HISTORY CHECKED?  Historical check performed    TYPE & SCREEN    Collection Time: 11/03/22  7:49 AM   Result Value Ref Range    Crossmatch Expiration 11/06/2022,2359     ABO/Rh(D) AB POSITIVE     Antibody screen NEG     CALLED TO: [facility] RN ICU 11/03/22 AT [address] [place] [address]     Unit number [id number]     Blood component type RC LR     Unit division 00     Status of unit ISSUED     Crossmatch result Compatible    GLUCOSE, POC    Collection Time: 11/03/22  9:11 AM   Result Value Ref Range    Glucose (POC) 160 (H) 70 - 110 mg/dL   ECHO ADULT COMPLETE    Collection Time: 11/03/22  9:18 AM   Result Value Ref Range    IVSd 0.9 0.6 - 1.0 cm    LVIDd 3.9 (A) 4.2 - 5.9 cm    LVIDs 2.8 cm    LVOT Diameter 1.9 cm    LVPWd 1.0 0.6 - 1.0 cm    EF BP 65 55 - 100 %    LV Ejection Fraction A2C 62 %    LV Ejection Fraction A4C 65 %    LV EDV A2C 26 mL    LV EDV A4C 30 mL    LV EDV BP 29 (A) 67 - 155 mL    LV ESV A2C 10 mL    LV ESV A4C 10 mL    LV ESV BP 10 (A) 22 - 58 mL    LVOT Peak Gradient 6 mmHg    LVOT Mean Gradient 4 mmHg    LVOT SV 65.7 ml    LVOT Peak Velocity 1.2 m/s    LVOT VTI 23.2 cm    LA Volume 4C 26 18 - 58 mL    AV Area by Peak Velocity 2.6 cm2    AV Area by VTI 2.8 cm2    AV Peak Gradient 7 mmHg    AV Mean Gradient 5 mmHg    AV Peak Velocity 1.3 m/s    AV Mean Velocity 1.1 m/s    AV VTI 23.0 cm    MV A Velocity 0.94 m/s    MV E Wave Deceleration Time 177.5 ms    MV E Velocity 0.57 m/s    LV E' Lateral Velocity 8 cm/s    LV E' Septal Velocity 6 cm/s    PV AT 91.3 ms    PV Peak Gradient 3 mmHg    PV Max Velocity 0.9 m/s    TAPSE 1.6 (A) 1.7 cm    Aortic Root 2.8 cm    Fractional Shortening 2D 28 28 - 44 %    LV ESV Index BP 6 mL/m2    LV EDV Index BP 17 mL/m2    LV ESV Index A4C 6 mL/m2    LV EDV Index A4C 18 mL/m2    LV ESV Index A2C 6 mL/m2    LV EDV Index A2C 15 mL/m2    LVIDd Index 2.28 cm/m2    LVIDs Index 1.64 cm/m2    LV RWT Ratio 0.51     LV Mass 2D 113.6 88 - 224 g    LV Mass 2D Index 66.4 49 - 115 g/m2    MV E/A 0.61     E/E' Ratio (Averaged) 8.31     E/E' Lateral 7.13     E/E' Septal 9.50     LVOT Stroke Volume Index 38.4 mL/m2    LVOT Area 2.8 cm2    LA Volume Index 4C 15 (A) 16 - 34 mL/m2    Ao Root Index 1.64 cm/m2    AV Velocity Ratio 0.92     LVOT:AV VTI Index 1.01     JAQUELINE/BSA VTI 1.6 cm2/m2    JAQUELINE/BSA Peak Velocity 1.5 cm2/m2   GLUCOSE, POC    Collection Time: 11/03/22 10:57 AM   Result Value Ref Range    Glucose (POC) 212 (H) 70 - 110 mg/dL   GLUCOSE, POC    Collection Time: 11/03/22  1:15 PM   Result Value Ref Range    Glucose (POC) 158 (H) 70 - 110 mg/dL   RETICULOCYTE COUNT    Collection Time: 11/03/22  2:40 PM   Result Value Ref Range    Reticulocyte count 1.3 0.5 - 2.5 %   FIBRINOGEN    Collection Time: 11/03/22  2:40 PM   Result Value Ref Range    Fibrinogen 757 (H) 210 - 451 mg/dL   HGB & HCT    Collection Time: 11/03/22  2:40 PM   Result Value Ref Range    HGB 8.4 (L) 13.0 - 16.0 g/dL    HCT 24.5 (L) 36.0 - 48.0 %         Chemistry Recent Labs     11/03/22  0539 11/02/22  1200   GLU 73* 300*   * 142   K 4.5 3.2*   * 116*   CO2 15* 13*   BUN 32* 38*   CREA 1.67* 1.79*   CA 7.7* 7.5*   MG 1.8 1.6   AGAP 9 13   BUCR 19 21*   * 163*   TP 5.5* 5.4*   ALB 1.3* 1.6*   GLOB 4.2* 3.8   AGRAT 0.3* 0.4*          Lactic Acid Lactic acid   Date Value Ref Range Status   08/27/2022 4.4 (HH) 0.4 - 2.0 MMOL/L Final     Comment:     CALLED TO AND CORRECTLY REPEATED BY:  SINDY MOORE ER ON 8/27/22 AT 1813 TO TAD       No results for input(s): LAC in the last 72 hours. Liver Enzymes Protein, total   Date Value Ref Range Status   11/03/2022 5.5 (L) 6.4 - 8.2 g/dL Final     Albumin   Date Value Ref Range Status   11/03/2022 1.3 (L) 3.4 - 5.0 g/dL Final     Globulin   Date Value Ref Range Status   11/03/2022 4.2 (H) 2.0 - 4.0 g/dL Final     A-G Ratio   Date Value Ref Range Status   11/03/2022 0.3 (L) 0.8 - 1.7   Final     Alk. phosphatase   Date Value Ref Range Status   11/03/2022 131 (H) 45 - 117 U/L Final     Recent Labs     11/03/22  0539 11/02/22  1200   TP 5.5* 5.4*   ALB 1.3* 1.6*   GLOB 4.2* 3.8   AGRAT 0.3* 0.4*   * 163*          CBC w/Diff Recent Labs     11/03/22  1440 11/03/22  0640 11/03/22  0539 11/02/22  1200   WBC  --   --  5.0 3.7*   RBC  --   --  2.19* 2.73*   HGB 8.4* 6.5* 6.6* 8.3*   HCT 24.5* 19.1* 19.8* 24.3*   PLT  --   --  65* 60*   GRANS  --   --  77* 58   LYMPH  --   --  12* 10*   EOS  --   --  1 0          Cardiac Enzymes No results found for: CPK, CK, CKMMB, CKMB, RCK3, CKMBT, CKNDX, CKND1, KIMBERLY, TROPT, TROIQ, BLANCO, TROPT, TNIPOC, BNP, BNPP     BNP No results found for: BNP, BNPP, XBNPT     Coagulation No results for input(s): PTP, INR, APTT, INREXT, INREXT in the last 72 hours.       Thyroid  No results found for: T4, T3U, TSH, TSHEXT, TSHEXT    No results found for: T4     Urinalysis Lab Results   Component Value Date/Time    Color YELLOW 11/02/2022 02:40 PM    Appearance CLEAR 11/02/2022 02:40 PM    Specific gravity 1.020 11/02/2022 02:40 PM    pH (UA) 5.5 11/02/2022 02:40 PM    Protein 100 (A) 11/02/2022 02:40 PM    Glucose 250 (A) 11/02/2022 02:40 PM    Ketone Negative 11/02/2022 02:40 PM    Bilirubin Negative 11/02/2022 02:40 PM    Urobilinogen 0.2 11/02/2022 02:40 PM    Nitrites Negative 11/02/2022 02:40 PM    Leukocyte Esterase Negative 11/02/2022 02:40 PM    Epithelial cells 1+ 11/02/2022 02:40 PM    Bacteria FEW (A) 11/02/2022 02:40 PM    WBC 0 to 3 11/02/2022 02:40 PM    RBC 0 to 3 11/02/2022 02:40 PM        Micro  Recent Labs     11/02/22  1207   CULT NO GROWTH AFTER 23 HOURS  NO GROWTH AFTER 23 HOURS     Recent Labs     11/02/22  1207   CULT NO GROWTH AFTER 23 HOURS  NO GROWTH AFTER 23 HOURS          Culture data during this hospitalization.    All Micro Results       Procedure Component Value Units Date/Time    CULTURE, RESPIRATORY/SPUTUM/BRONCH Mollyel Mcburney [[id number]]     Order Status: Sent Specimen: Sputum     STREP Alveda Chester, URINE [[id number]]     Order Status: Sent Specimen: Urine     LEGIONELLA PNEUMOPHILA AG, URINE [[id number]]     Order Status: Sent Specimen: Urine     CULTURE, URINE [[id number]] Collected: 11/02/22 1440    Order Status: Sent Specimen: Cath Urine Updated: 11/03/22 1459    CULTURE, BLOOD [[id number]] Collected: 11/02/22 1207    Order Status: Completed Specimen: Blood Updated: 11/03/22 1155     Special Requests: NO SPECIAL REQUESTS        Culture result: NO GROWTH AFTER 23 HOURS       CULTURE, BLOOD [[id number]] Collected: 11/02/22 1207    Order Status: Completed Specimen: Blood Updated: 11/03/22 1155     Special Requests: NO SPECIAL REQUESTS        Culture result: NO GROWTH AFTER 23 HOURS       COVID-19 WITH INFLUENZA A/B [[id number]]  (Abnormal) Collected: 11/02/22 1346    Order Status: Completed Specimen: Nasopharyngeal Updated: 11/02/22 1448     SARS-CoV-2 by PCR Detected        Comment: Positive results are indicative of the presence of SARS-CoV-2. Clinical correlation with patient history and other diagnostic information is necessary to determine patient infection status. Positive results do not rule out bacterial infection or co-infection with other pathogens. CALLED TO AND CORRECTLY REPEATED BY:  ANJEL CALLAHAN ED 11/02/22 AT 1448 BY ANI          Influenza A by PCR Not detected        Influenza B by PCR Not detected        Comment: NOTE: Influenza A and Influenza B negative results should be considered presumptive in samples that have a positive SARS-CoV-2 result. Consider re-testing with an alternate FDA-approved test if clinically indicated. Testing was performed using gracy [employer] SARS-CoV-2 and Influenza A/B nucleic acid assay. This test is a multiplex Real-Time Reverse Transcriptase Polymerase Chain Reaction (RT-PCR) based in vitro diagnostic test intended for the qualitative detection of nucleic acids from SARS-CoV-2, Influenza A, and Influenza B in nasopharyngeal for use under the FDA's Emergency Use Authorization(EAU) only. Fact sheet for Patients: FindDrives.pl  Fact sheet for Healthcare Providers: FindDrives.pl         COVID-19 RAPID TEST [[id number]]     Order Status: Sent                CT CHEST ABD PELV WO CONT    Result Date: 11/2/2022  EXAM: CT chest, abdomen, and pelvis INDICATION: Shortness of breath. COMPARISON: 8/27/2022 TECHNIQUE: Axial CT imaging of the chest, abdomen, and pelvis was performed after IV contrast administration. Multiplanar reformats were generated. One or more dose reduction techniques were used on this CT: automated exposure control, adjustment of the mAs and/or kVp according to patient size, and iterative reconstruction techniques.   The specific techniques used on this CT exam have been documented in the patient's electronic medical record. Digital Imaging and Communications in Medicine (DICOM) format image data are available to nonaffiliated external healthcare facilities or entities on a secure, media free, reciprocally searchable basis with patient authorization for at least a 12-month period after this study. _______________ FINDINGS: CHEST: MEDIASTINUM: Heart size is normal. Small pericardial effusion. Normal caliber aorta with vascular calcifications LYMPH NODES: No pathologically enlarged mediastinal or hilar lymph nodes. PLEURA: No pleural effusion or pneumothorax. LUNGS/AIRWAY: Extensive bilateral parenchymal groundglass locations with angulation. No evidence of honeycombing. OTHER: None. ** ABDOMEN/PELVIS: Lack of intravenous contrast limits evaluation of abdominal viscera. LIVER, BILIARY: Liver is unremarkable. No abnormal biliary dilation. Gallbladder is unremarkable. PANCREAS: Unremarkable. SPLEEN: Unremarkable. ADRENALS: Unremarkable. KIDNEYS: No hydronephrosis or renal calcification. Oliveira catheter within the urinary bladder. LYMPH NODES: No pathologically enlarged lymph nodes. GASTROINTESTINAL TRACT: Prior right colectomy with right ileocolic anastomosis. No abnormal bowel dilation or wall thickening. PELVIC ORGANS: Unremarkable. VASCULATURE: Vascular calcifications. OSSEOUS: Numerous sclerotic foci throughout the axial skeleton possibly representing bone islands. No area of erosion or aggressive-appearing bone destruction. OTHER: Trace ascites. _______________     Extensive groundglass opacities throughout the lungs, may reflect atypical infection or edema superimposed on or as pneumonia. No acute intra-abdominal abnormality. Trace ascites.     XR CHEST PORT    Result Date: 11/2/2022  EXAM: XR CHEST PORT CLINICAL INDICATION/HISTORY: meets SIRS criteria -Additional: None COMPARISON: 8/27/2022 TECHNIQUE: Frontal view of the chest _______________ FINDINGS: HEART AND MEDIASTINUM: Right chest wall port tip at the cavoatrial junction. Normal cardiac size and mediastinal contours. LUNGS AND PLEURAL SPACES: Bilateral interstitial and parenchymal opacities. No focal pneumothorax or pleural effusion. BONY THORAX AND SOFT TISSUES: No acute osseous abnormality _______________     Bilateral interstitial and parenchymal opacities. Images report reviewed by me:  CT (Most Recent) (CT chest reviewed by me) Results from Hospital Encounter encounter on 11/02/22    CT CHEST ABD PELV WO CONT    Narrative  EXAM: CT chest, abdomen, and pelvis    INDICATION: Shortness of breath. COMPARISON: 8/27/2022    TECHNIQUE: Axial CT imaging of the chest, abdomen, and pelvis was performed  after IV contrast administration. Multiplanar reformats were generated. One or more dose reduction techniques were used on this CT: automated exposure  control, adjustment of the mAs and/or kVp according to patient size, and  iterative reconstruction techniques. The specific techniques used on this CT  exam have been documented in the patient's electronic medical record. Digital  Imaging and Communications in Medicine (DICOM) format image data are available  to nonaffiliated external healthcare facilities or entities on a secure, media  free, reciprocally searchable basis with patient authorization for at least a  12-month period after this study. _______________    FINDINGS:    CHEST:    MEDIASTINUM: Heart size is normal. Small pericardial effusion. Normal caliber  aorta with vascular calcifications    LYMPH NODES: No pathologically enlarged mediastinal or hilar lymph nodes. PLEURA: No pleural effusion or pneumothorax. LUNGS/AIRWAY: Extensive bilateral parenchymal groundglass locations with  angulation. No evidence of honeycombing. OTHER: None. **    ABDOMEN/PELVIS:    Lack of intravenous contrast limits evaluation of abdominal viscera. LIVER, BILIARY: Liver is unremarkable. No abnormal biliary dilation. Gallbladder  is unremarkable. PANCREAS: Unremarkable. SPLEEN: Unremarkable. ADRENALS: Unremarkable. KIDNEYS: No hydronephrosis or renal calcification. Oliveira catheter within the  urinary bladder. LYMPH NODES: No pathologically enlarged lymph nodes. GASTROINTESTINAL TRACT: Prior right colectomy with right ileocolic anastomosis. No abnormal bowel dilation or wall thickening. PELVIC ORGANS: Unremarkable. VASCULATURE: Vascular calcifications. OSSEOUS: Numerous sclerotic foci throughout the axial skeleton possibly  representing bone islands. No area of erosion or aggressive-appearing bone  destruction. OTHER: Trace ascites. _______________    Impression  Extensive groundglass opacities throughout the lungs, may reflect atypical  infection or edema superimposed on or as pneumonia. No acute intra-abdominal abnormality. Trace ascites. CXR reviewed by me:  XR (Most Recent). CXR  reviewed by me and compared with previous CXR Results from Hospital Encounter encounter on 11/02/22    XR CHEST PORT    Narrative  EXAM: XR CHEST PORT    CLINICAL INDICATION/HISTORY: COVID19 pneumonia  -Additional: None    COMPARISON: 11/2/2022    TECHNIQUE: Portable frontal view of the chest    _______________    FINDINGS:    SUPPORT DEVICES: Right chest wall port unchanged. HEART AND MEDIASTINUM: Cardiomediastinal silhouette within normal limits. LUNGS AND PLEURAL SPACES: Extensive bilateral parenchymal and interstitial  opacities, unchanged. No pneumothorax.    _______________    Impression  Extensive bilateral parenchymal and interstitial opacities, unchanged. ·Please note: Voice-recognition software may have been used to generate this report, which may have resulted in some phonetic-based errors in grammar and contents. Even though attempts were made to correct all the mistakes, some may have been missed, and remained in the body of the document.       Cee Bonilla MD  11/3/2022

## 2022-11-03 NOTE — PROGRESS NOTES
Reason for Admission:  c/o SOB                     RUR Score: 5                    Plan for utilizing home health:    yes      PCP: First and Last name:  Brendan Alaniz     Name of Practice: [facility] clinic   Are you a current patient: Yes/No:    Approximate date of last visit:    Can you participate in a virtual visit with your PCP:                     Current Advanced Directive/Advance Care Plan: Full Code      Healthcare Decision Maker:   Click here to complete [address] including selection of the [address] Relationship (ie \"Primary\")             Primary Decision MakerRross Teran - [phone]    Secondary Decision Maker: Jesus Dylan - [phone]                  Transition of Care Plan:   chart reviewed and visited unit, cm was informed that pt recently discharged Home from [facility] after being treated for covid, pt was discharged from [facility] with [facility] services unable to recall agency name at this time, cm did reach out to patients MPOA granddaughter Christine Gutierrez  [phone], she did inform cm that pt is active with [facility] and see's Dr. Jd Zuniga from [facility], denies her grandfather uses any home DME's or home 02,  granddaughter stated her grandfather has been very independent and recently retired within the last year, when discharged daughter plans for pt to stay with her and family to assist with his care, address on facesheet verified and is patients correct address and contact number,once pt is more stable cm will follow up with pt to discuss possible needs for safe discharge planning and offer FOC once pt is able to participate with discharge planning.

## 2022-11-03 NOTE — ACP (ADVANCE CARE PLANNING)
Advance Care Planning     General Advance Care Planning (ACP) Conversation      Date of Conversation: 11/2/2022  Conducted with: Patient with Decision Making Capacity    Healthcare Decision Maker:     Primary Decision Maker: Zach Bray - [phone]    Secondary Decision Maker: Ishmael Jamison - [phone]  Click here to complete [address] including selection of the Healthcare Decision Maker Relationship (ie \"Primary\")  Today we documented Decision Maker(s) consistent with ACP documents on file. Content/Action Overview:   Has NO ACP documents/care preferences - requested patient complete ACP documents  Reviewed DNR/DNI and patient elects Full Code (Attempt Resuscitation)  Topics discussed: treatment goals, benefit/burden of treatment options, ventilation preferences, and resuscitation preferences    Palliative Medicine Team members, Giovanny Lemus NP and this writer for consult visit. Patient was seen in ICU bed 103 with proper PPE for + Covid . Patient is maintained on high flow of 7 liter down from 15 on admission. H& H - 6.5/19.1 receiving 1 unit transfusion PRBCs. Mr. Grover Escobar is alert and aware x4. He was able to share with the team the reason for his hospital; admission \"I was short of breath and was + for Covid. When asked about his access port he shared that was for his chemo to treat his prostate and colon cancer. He was not sure if the treatment was palliative or for a cure, \"I guess a cure\". Patient is . He does not have an Advance Medical Directive. He agrees to complete one naming his granddaughter, Rambo Lobo as primary healthcare agent. He named his grandson, Blaine Fay as surrogate agent. With permission from Mr. Gusmanro Marty Team placed call to his granddaughter, Candace Gill to update her on the above. She shared that patient has been getting weaker at home and not fully understanding his complex medical diagnosis.  Ms. William Lion stated she will start going to this MD appointments in the future. Team also discussed the benefits and burdens of intubation and CPR in the event of respiratory decline or cardiopulmonary arrest in a patient with his stage cancer and respiratory issues. She understands the need to address this with Mr. Jerry Amin and will be more involved. She expressed gratitude for the call and information. Copies of the AMD were placed on the chart for scanning. Original and copies left in AMD folder on the chart for patient to take at discharge.      Zuleyka MICHELLE, RN, [facility]  Palliative Medicine Inpatient RN  Palliative COPE Line: [phone]

## 2022-11-03 NOTE — CONSULTS
Brief Afar ID Note    Skulking from afar reading up before tomorrow's rounding. Never too late for steroids to treat hypoxic COVID infections - agree too late for remdesivir. Seems his oxygen requirements improving (so hold the tocilizumab for now). Will likely DC pip/tzb+vanco tomorrow after seeing him. (Hold the extra fluid/salt-Na+ to his lungs).     Oksana Chinchilla MD  Cell [phone]  [facility]

## 2022-11-03 NOTE — PROGRESS NOTES
Hospitalist Progress Note    Patient: Jordan Silverio MRN: [id number]  CSN: [id number]    YOB: 1940  Age: [de-identified] y.o. Sex: male    DOA: 11/2/2022 LOS:  LOS: 1 day          Chief Complaint:    sepsis      Assessment/Plan     Septic shock  Pressor support  IVF  Echo being done this am  Patient is a full code   palliative care consult       Hypoxic respiratory failure  pneumonia  VQ neg for Pe  Duplex scan of lower extremity ordered      Metastatic prostate cancer history of colon cancer   Thrombocytopenia   Severe anmeia  Transfuse PRBC  Hematology consult   Occult stool check     CLAUS hold all hypertensive agents     Diabetes-Started on Lantus and sliding scale insulin     Respiratory viral panel pending   COVID is still positive   ID consult      Palliative care consult- full code    ICU care    Daily labs     Disposition :  Patient Active Problem List   Diagnosis Code    Hypokalemia E87.6    Hypothermia T68. XXXA    Pneumonia J18.9    CLAUS (acute kidney injury) ([facility] [address] N17.9    Septic shock (HCC) A41.9, R65.21       Subjective: In isolation  Echo being done  Seen thru glass to reduce exposure risk    Review of systems:    No new nursing issues relayed to me      Vital signs/Intake and Output:  Visit Vitals  BP (!) 136/53   Pulse (!) 103   Temp 97.3 °F (36.3 °C)   Resp 29   Ht 5' 7\" (1.702 m)   Wt 62 kg (136 lb 11 oz)   SpO2 100%   BMI 21.41 kg/m²     Current Shift:  No intake/output data recorded.   Last three shifts:  11/01 1901 - 11/03 0700  In: 2295.7 [I.V.:2295.7]  Out: 36 [Urine:820]    Exam:    General: awkae and alert AAM NAD, mild tachypnea  CVS:tachy on monitor    Extremities: No C/C/E, pulses palpable 2+    Neuro:grossly normal     See via glass and door                Labs: Results:       Chemistry Recent Labs     11/03/22  0539 11/02/22  1200   GLU 73* 300*   * 142   K 4.5 3.2*   * 116*   CO2 15* 13*   BUN 32* 38*   CREA 1.67* 1.79*   CA 7.7* 7.5*   AGAP 9 13   BUCR 19 21*   AP 131* 163*   TP 5.5* 5.4*   ALB 1.3* 1.6*   GLOB 4.2* 3.8   AGRAT 0.3* 0.4*      CBC w/Diff Recent Labs     11/03/22  0640 11/03/22  0539 11/02/22  1200   WBC  --  5.0 3.7*   RBC  --  2.19* 2.73*   HGB 6.5* 6.6* 8.3*   HCT 19.1* 19.8* 24.3*   PLT  --  65* 60*   GRANS  --  77* 58   LYMPH  --  12* 10*   EOS  --  1 0      Cardiac Enzymes No results for input(s): CPK, CKND1, KIMBERLY in the last 72 hours. No lab exists for component: CKRMB, TROIP   Coagulation No results for input(s): PTP, INR, APTT, INREXT in the last 72 hours. Lipid Panel No results found for: CHOL, CHOLPOCT, CHOLX, CHLST, CHOLV, 854326, HDL, HDLP, LDL, LDLC, DLDLP, 815030, VLDLC, VLDL, TGLX, TRIGL, TRIGP, TGLPOCT, CHHD, CHHDX   BNP No results for input(s): BNPP in the last 72 hours.    Liver Enzymes Recent Labs     11/03/22  0539   TP 5.5*   ALB 1.3*   *      Thyroid Studies No results found for: T4, T3U, TSH, TSHEXT     Procedures/imaging: see electronic medical records for all procedures/Xrays and details which were not copied into this note but were reviewed prior to creation of Gwendolyn MD Paul

## 2022-11-03 NOTE — PROGRESS NOTES
[address] [facility] Pharmacokinetic Monitoring Service - Vancomycin    Consulting Provider: Asmita Brooks   Indication: HCAP  Target Concentration: Goal AUC/ANNALISE 400-600 mg*hr/L  Day of Therapy: 2  Additional Antimicrobials: Zosyn, Levaquin    Pertinent Laboratory Values: Wt Readings from Last 1 Encounters:   11/03/22 61.2 kg (135 lb)     Temp Readings from Last 1 Encounters:   11/03/22 97.5 °F (36.4 °C)     No components found for: PROCAL  Estimated Creatinine Clearance: 30.5 mL/min (A) (based on SCr of 1.67 mg/dL (H)).   Recent Labs     11/03/22  0539 11/02/22  1200   WBC 5.0 3.7*       Pertinent Cultures:  Culture Date Source Results   11/3/2022 Blood No growth     Plan:  Current dosing regimen is therapeutic  Continue with Vancomycin 750 mg IV Q 24 hours  Repeat vancomycin concentration ordered for 11/4/2022 @ 0400   Pharmacy will continue to monitor patient and adjust therapy as indicated    Thank you for the consult,  Armaan Mendieta, [facility]  11/3/2022 11:50 AM

## 2022-11-03 NOTE — CONSULTS
[facility]  (A Division of [address] [place] [address])    Deepali Castaneda MD  Office #: [phone]- Option # [phone]  Fax #: [phone]     Date of Admission: 11/2/2022      Date of Note: 11/3/2022     Reason for Referral: Evaluation and antibiotic management of severe sepsis     Thank you for involving me in the care of this patient. Please do not hesitate to contact me on the above number if question or concern. Current Antimicrobials:    Prior Antimicrobials:  Vanco and Zosyn and Levofloxacin-- 11/2 to date   Doxycycline and Augmentin 10/23 X1 week   Antibiotic allergy: None     Assessment:     Immunocompromised patient with:  Septic shock/hypothermia-- etiology lung Vs GI. Possible urinary- though UA finding benign for infection. BL ground glass opacity on CT present from 10/23. Possible device related-- he has R mediport  --Possible DIC due to above, lactica cidosis, renal failure, elevated LD  COVID 19 infection-- tested positive since 10/23. Remains positive   --[facility] admission from 10/3 to 10/4- for COVID 19 PNA  --ground glass opacity could be due to COVID 19. Procal elevated and superimposed bacterial infection possible  Acute resp failure-- hypoxia improved from 15L to 3 L since admission. 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 Streamline to levofloxacin and flagyl if cultures remain negative-- to treat a GI source   Cont supportive care per ICU team  Hematology following  DW Dr Ruel Clark is covering on Friday, 11/4/22. If questions or new consults,  Please call via  or cell- [phone]. I will resume coverage from Saturday. Thanks. HPI:     Malcolm Reyez is a [de-identified] y.o.  BLACK/ AMERICAN with PMH of colon/prostate cancer with bone mets undergoing chemo, CKD, episodes of cytopenia's per heme notes,  recent brief admission from Oct 23-24 at [facility] with COVID 19 Pneumonia. He was Dc'ed with oral doxy on discharge. He completed course end of Oct.    He was brought in to ED on 11.2 with weakness of 2 days, progressive SOB and weakness and found hypoxic and tachycardic. He was placed on HFNC at 15 litres, given fluid resucitation and pressor on. Placed on Vanco/Levofloxacin and Zosyn. He has diffuse GGO on his CT, which was also apparent from his Oct evaluation at [facility]. Pt is weak but awake. Not a good historian. At time of exam, he was on warmer for his hypothermia. He denies HA/throat pain/chest pain or abd pain. Chart reviewed on prior notes. Care Everywhere reviewed-- in [facility] system. CT chest- no PE, blood cultures: NG. Flu neg, covid + 10/23  Imaging repots reviewed-- CT image reviewed as well. Pneumonia [J18.9]  Septic shock ([facility] [A41.9, R65.21]  CLAUS (acute kidney injury) ([facility] [N17.9]  Hypothermia [T68. XXXA]  Hypokalemia [E87.6]  History reviewed. No pertinent surgical history. History reviewed. No pertinent family history.   Medications reviewed as below:   Current Facility-Administered Medications   Medication Dose Route Frequency Provider Last Rate Last Admin    sodium chloride (23.4%) 0.9 % in dextrose 10% 1,040 mL infusion   IntraVENous CONTINUOUS Shashi Bowers MD 50 mL/hr at 11/03/22 0359 New Bag at 11/03/22 0359    0.9% sodium chloride infusion 250 mL  250 mL IntraVENous PRN Shashi Bowers MD        insulin lispro (HUMALOG) injection   SubCUTAneous AC&HS Girish BRICENO MD   2 Units at 11/03/22 1420    insulin glargine (LANTUS) injection 12 Units  0.2 Units/kg SubCUTAneous QHS Girish BRICENO MD   12 Units at 11/03/22 1416    [START ON 11/4/2022] Vancomycin Random level due 11/4/22 at 0400  1 Each Other ONCE Imelda Alfaro MD        vancomycin (VANCOCIN) 750 mg in 0.9% sodium chloride 250 mL (VIAL-MATE)  750 mg IntraVENous Q24H Johnie Alfaro MD        piperacillin-tazobactam (ZOSYN) 3.375 g in 0.9% sodium chloride (MBP/ADV) 100 mL MBP  3.375 g IntraVENous Q8H KikiSally amador PA 25 mL/hr at 11/03/22 1416 3.375 g at 11/03/22 1416    sodium chloride (NS) flush 5-10 mL  5-10 mL IntraVENous PRN Josi Alfaro MD        Vancomycin - Pharmacy to Dose  1 Each Other Rx Dosing/Monitoring Josseline Carcamo MD        NOREPINephrine (LEVOPHED) 8 mg in 5% dextrose 250mL (32 mcg/mL) infusion  0.5-16 mcg/min IntraVENous TITRATE Josi Alfaro MD 11.3 mL/hr at 11/03/22 1035 6 mcg/min at 11/03/22 1035    [START ON 11/4/2022] levoFLOXacin (LEVAQUIN) 750 mg in D5W IVPB  750 mg IntraVENous Q48H Josi Alfaro MD        ELECTROLYTE REPLACEMENT PROTOCOL - Potassium Renal Dosing  1 Each Other PRN Mayda Weinberg MD        ELECTROLYTE REPLACEMENT PROTOCOL - Magnesium   1 Each Other PRN Mayda Weinberg MD        ELECTROLYTE REPLACEMENT PROTOCOL  - Phosphorus Renal Dosing  1 Each Other PRN Modesto BRICENO MD        ELECTROLYTE REPLACEMENT PROTOCOL - Calcium   1 Each Other PRN Mayda Weinberg MD        glucose chewable tablet 16 g  4 Tablet Oral PRN Dai Gonzalez MD        glucagon (GLUCAGEN) injection 1 mg  1 mg IntraMUSCular PRN Dai Gonzalez MD        dextrose 10% infusion 0-250 mL  0-250 mL IntraVENous PRN Dai Gonzalez MD   250 mL at 11/03/22 0538    pantoprazole (PROTONIX) 40 mg in 0.9% sodium chloride 10 mL injection  40 mg IntraVENous Q12H Dai Gonzalez MD   40 mg at 11/03/22 0901    [Held by provider] heparin (porcine) injection 5,000 Units  5,000 Units SubCUTAneous Q8H Dai Gonzalez MD         No Known Allergies  Social History     Socioeconomic History    Marital status:      Spouse name: Not on file    Number of children: Not on file    Years of education: Not on file    Highest education level: Not on file Occupational History    Not on file   Tobacco Use    Smoking status: Not on file    Smokeless tobacco: Not on file   Substance and Sexual Activity    Alcohol use: Not on file    Drug use: Not on file    Sexual activity: Not on file   Other Topics Concern    Not on file   Social History Narrative    Not on file     Social Determinants of Health     Financial Resource Strain: Not on file   Food Insecurity: Not on file   Transportation Needs: Not on file   Physical Activity: Not on file   Stress: Not on file   Social Connections: Not on file   Intimate Partner Violence: Not on file   Housing Stability: Not on file        Review of Systems    Negative Unless BOLDED  See HPI        Objective:      Visit Vitals  BP (!) 126/41   Pulse 97   Temp 97.7 °F (36.5 °C)   Resp 29   Ht 5' 7\" (1.702 m)   Wt 61.2 kg (135 lb)   SpO2 100%   BMI 21.14 kg/m²     Temp (24hrs), Av.6 °F (35.9 °C), Min:93.9 °F (34.4 °C), Max:98.2 °F (36.8 °C)      Lines / Catheters:     BL PIV  R mediport in place    General:   WD, acutely sick looking, awake alert responds to questions  On 3 L NC support     Skin:   no rashes or skin lesions noted on limited exam   HEENT:  Normocephalic, atraumatic,no scleral icterus or pallor; no conjunctival hemmohage; No thrush. Neck supple, no bruits. Lymph Nodes:   no cervical, axillary or inguinal adenopathy   Lungs:   non-labored, bilaterally clear to ausculation- anteriorly. Posterior exam not done   Heart:  Irregular RR, s1 and s2; no murmurs rubs or gallops   Abdomen:  soft, non-distended, active bowel sounds. Appropriate surgical scars for stated surgeries. Non-tender   Genitourinary: Oliveira inp lace   Extremities:   no clubbing, cyanosis; no joint effusions or swelling   Neurologic:  No gross focal sensory abnormalities;  Cranial nerves intact   Psychiatric:   appropriate and interactive.      Results       Procedure Component Value Units Date/Time    CULTURE, URINE [[id number]] Collected: 22 1448 Order Status: Sent Specimen: Cath Urine Updated: 11/03/22 0223    COVID-19 RAPID TEST [[id number]]     Order Status: Sent     COVID-19 WITH INFLUENZA A/B [[id number]]  (Abnormal) Collected: 11/02/22 1346    Order Status: Completed Specimen: Nasopharyngeal Updated: 11/02/22 1448     SARS-CoV-2 by PCR Detected        Comment: Positive results are indicative of the presence of SARS-CoV-2. Clinical correlation with patient history and other diagnostic information is necessary to determine patient infection status. Positive results do not rule out bacterial infection or co-infection with other pathogens. CALLED TO AND CORRECTLY REPEATED BY:  ANJEL CALLAHAN ED 11/02/22 AT 1448 BY ANI          Influenza A by PCR Not detected        Influenza B by PCR Not detected        Comment: NOTE: Influenza A and Influenza B negative results should be considered presumptive in samples that have a positive SARS-CoV-2 result. Consider re-testing with an alternate FDA-approved test if clinically indicated. Testing was performed using gracy Alize SARS-CoV-2 and Influenza A/B nucleic acid assay. This test is a multiplex Real-Time Reverse Transcriptase Polymerase Chain Reaction (RT-PCR) based in vitro diagnostic test intended for the qualitative detection of nucleic acids from SARS-CoV-2, Influenza A, and Influenza B in nasopharyngeal for use under the FDA's Emergency Use Authorization(EAU) only.        Fact sheet for Patients: FindDrives.pl  Fact sheet for Healthcare Providers: FindDrives.pl         CULTURE, BLOOD [[id number]] Collected: 11/02/22 1207    Order Status: Completed Specimen: Blood Updated: 11/03/22 1155     Special Requests: NO SPECIAL REQUESTS        Culture result: NO GROWTH AFTER 23 HOURS       CULTURE, BLOOD [[id number]] Collected: 11/02/22 1207    Order Status: Completed Specimen: Blood Updated: 11/03/22 1155     Special Requests: NO SPECIAL REQUESTS        Culture result: NO GROWTH AFTER 23 HOURS               Labs: Results:   Chemistry Recent Labs     11/03/22  0539 11/02/22  1200   GLU 73* 300*   * 142   K 4.5 3.2*   * 116*   CO2 15* 13*   BUN 32* 38*   CREA 1.67* 1.79*   CA 7.7* 7.5*   AGAP 9 13   BUCR 19 21*   * 163*   TP 5.5* 5.4*   ALB 1.3* 1.6*   GLOB 4.2* 3.8   AGRAT 0.3* 0.4*      CBC w/Diff Recent Labs     11/03/22  0640 11/03/22  0539 11/02/22  1200   WBC  --  5.0 3.7*   RBC  --  2.19* 2.73*   HGB 6.5* 6.6* 8.3*   HCT 19.1* 19.8* 24.3*   PLT  --  65* 60*   GRANS  --  77* 58   LYMPH  --  12* 10*   EOS  --  1 0            Imaging:      All imaging reviewed from Admission to present as per radiology interpretation in [address] [place] [address]

## 2022-11-03 NOTE — DIABETES MGMT
Diabetes/ Glycemic Control Plan of Care  Recommendations:   Once glucose stable, recommend initiating Lantus 12 units every 24 hours (0.2 units/kg/day)  Recommend changing Humalog corrective insulin to normal sensitivity scale  Recommend discontinuing dextrose 10% (diet initiated, patient consumed >50% of tray)    Assessment: Patient with multiple episodes of hypoglycemia overnight. Glucose 401 mg/dL @ 1539 on admission to ICU - patient treated with corrective Humalog. At 1739,  mg/dL - patient again treated with corrective Humalog. Patient became hypoglycemic and Lantus held at that time by provider. FBG 73 mg/dL this AM.  Diet initiated - per RN, patient ate well at breakfast.  Most recent  mg/dL. Recommend initiating a weight-based dose of Lantus at this time to minimize need for corrective Humalog and changing Humalog corrective scale to normal sensitivity. Patient may require mealtime Humalog - GC will continue to monitor and make recommendations as needed. Above insulin recommendations discussed with MD - orders entered with MD permission. DX:   1. Hypoxia        2. Hypokalemia        3. Sepsis with acute renal failure and septic shock, due to unspecified organism, unspecified acute renal failure type ([address]        4. Pneumonia of both lungs due to infectious organism, unspecified part of lung        5. CLAUS (acute kidney injury) ([address]        6. COVID-19 long hauler           Recent Glucose Results:   Lab Results   Component Value Date/Time    GLU 73 (L) 11/03/2022 05:39 AM    GLUCPOC 212 (H) 11/03/2022 10:57 AM    GLUCPOC 160 (H) 11/03/2022 09:11 AM    GLUCPOC 178 (H) 11/03/2022 06:22 AM      IV Fluids containing dextrose:    NOREPINephrine (LEVOPHED) 8 mg in 5% dextrose 250mL (32 mcg/mL) infusion       sodium chloride (23.4%) 0.9 % in dextrose 10% 1,040 mL infusion      Steroids:  none    Blood glucose values:         Within target range (70-180mg/dL):  NO  Current insulin orders: Lantus 12 units every 24 hours (held)   Corrective Humalog - very resistant scale  Total Daily Dose previous 24 hours = 27 units (corrective only)    Current A1c:   Lab Results   Component Value Date/Time    Hemoglobin A1c 7.8 (H) 11/03/2022 05:39 AM      equivalent  to ave Blood Glucose of 177 mg/dl for 2-3 months prior to admission  Adequate glycemic control PTA: YES    Nutrition/Diet:   Active Orders   Diet    ADULT DIET Easy to Chew; 5 carb choices (75 gm/meal)      Meal Intake:  Patient Vitals for the past 168 hrs:   % Diet Eaten   11/03/22 0800 51 - 75%     Home diabetes medications:   Key Antihyperglycemic Medications               metFORMIN (GLUCOPHAGE) 1,000 mg tablet Take 1,000 mg by mouth two (2) times daily (with meals). Plan/Goals:   Blood glucose will be within target of 70 - 180 mg/dl within 72 hours  Reinforce dietary and medication compliance at home.          Education:  [] Refer to Diabetes Education Record                       [x] Education not indicated at this time       Luiza Caal RN, BSN, [facility] [place]  Professional   Glycemic Control Team   Phone:  [phone]  Tues - Thurs 8:30 - 4:30

## 2022-11-03 NOTE — PROGRESS NOTES
Ester Gisela Zosyn (Piperacillin/Tazobactam) Extended Infusion    Forthuan Eden, a [de-identified] y.o. yo male, has been converted to an extended infusion of Zosyn while in the intensive care unit. Extended infusions will begin 4 hours after the initial dose if CrCl  >/= 20 ml/min or 8 hours after the initial dose if CrCl < 20 ml/min. Extended infusions will run over 4 hours (240 minutes).     Recent Labs     11/03/22  0539 11/02/22  1200   CREA 1.67* 1.79*     Ht Readings from Last 1 Encounters:   11/03/22 170.2 cm (67\")     Wt Readings from Last 1 Encounters:   11/03/22 61.2 kg (135 lb)       CrCl : Serum creatinine: 1.67 mg/dL (H) 11/03/22 0539  Estimated creatinine clearance: 30.5 mL/min (A)    Renal adjustment of extended infusion of Zosyn  3.375  every 8 hours for CrCl >/= 20 ml/min

## 2022-11-03 NOTE — CONSULTS
Palliative Medicine Consult    Patient Name: Jazzmine Rodrigues  YOB: 1940    Date of Initial Consult: 11/3/2022  Reason for Consult: Goals of care discussions  Requesting Provider: Dr. Martín Montelongo  Primary Care Physician: Ibrahima Langston MD      SUMMARY:   Jazzmine Rodrigues is a [de-identified] y.o. with a past history of hypertension, metastatic prostate cancer, colon cancer status postresection, and DM, who was admitted on 11/2/2022 from home with a diagnosis of hypoxic respiratory failure, pneumonia, COVID-19, metastatic prostate cancer, and CLAUS. Current medical issues leading to Palliative Medicine involvement include: goals of care discussions in the setting of advanced age and chronic comorbidities. PALLIATIVE DIAGNOSES:   Goals of care discussions  Hypoxic respiratory failure/Pneumonia/COVID-19/septic shock  Stage IV colon cancer to bone   Advanced age/debility        PLAN:   Goals of care discussions: Palliative medicine team including Veronica Qiu RN and I met with patient at patient's bedside. Patient is awake, alert, and oriented x 4. He is talking in complete sentences but does appear to be short of breath. Patient reports he is short of breath but feels he is improving. Introduced role as palliative medicine. Patient does not have an AMD but he did complete an AMD today naming granddaughter William Conde as primary MPOA and grandson Beryle Grays as secondary MPOA. Discussed the benefits and burdens of CPR in the event of cardiopulmonary arrest in the setting of advanced age and chronic comorbidities. Patient states he would want efforts at CPR but I am not sure he understood the complexity of the benefits and burdens as he could not repeat it back. Asked patient if the chemo he was receiving was curative or palliative in nature, and he stated he did not know. With patient's permission, called patient's granddaughter Sid Egan who agrees to act in the role as MPOA.  Chanoon Julisa also questions patients ability to understand complex medical information because when patient goes to an appointment alone, he cannot tell her what the doctors said. Tressa Virginie states she will be attending all future appointments with patient. Discussed the benefits and burdens of CPR in the event of cardiopulmonary arrest with Tressa Rachel. Tressa Rachel agrees to continue these conversations with patient. At this time patient remains a full code with full interventions. Hypoxic respiratory failure/Pneumonia/COVID-19/septic shock: now on 4 L NC, weaning from O2 appropriately. Patient states his SOB is improving. On pressor support. On IVAB. 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 Advanced age/debility: [de-identified]year old male who lives at home with his granddaughter Caesar Jose. He needs assistance with functional ADLs. Initial consult note routed to primary continuity provider  Communicated plan of care with: Palliative IDT       GOALS OF CARE / TREATMENT PREFERENCES:   [====Goals of Care====]  GOALS OF CARE: Full code with full interventions  Patient/Health Care Proxy Stated Goals: Prolong life      TREATMENT PREFERENCES:   Code Status: Full Code    Advance Care Planning:  No flowsheet data found.     Medical Interventions: Full interventions            The palliative care team has discussed with patient / health care proxy about goals of care / treatment preferences for patient.  [====Goals of Care====]         HISTORY:     History obtained from: patient, chart, family    CHIEF COMPLAINT: shortness of breath    HPI/SUBJECTIVE:    The patient is:   [x] Verbal and participatory  [] Non-participatory due to:   Oriented x 4     Clinical Pain Assessment (nonverbal scale for severity on nonverbal patients):   Clinical Pain Assessment  Severity: 0            FUNCTIONAL ASSESSMENT:     Palliative Performance Scale (PPS):  PPS: 50       PSYCHOSOCIAL/SPIRITUAL SCREENING:     Advance Care Planning:  No flowsheet data found. Any spiritual / Evangelical concerns:  [] Yes /  [x] No    Caregiver Burnout:  [] Yes /  [x] No /  [] No Caregiver Present      Anticipatory grief assessment:   [x] Normal  / [] Maladaptive               REVIEW OF SYSTEMS:     Positive and pertinent negative findings in ROS are noted above in HPI. The following systems were [x] reviewed / [] unable to be reviewed as noted in HPI  Other findings are noted below. Systems: constitutional, ears/nose/mouth/throat, respiratory, gastrointestinal, genitourinary, musculoskeletal, integumentary, neurologic, psychiatric, endocrine. Positive findings noted below. Modified ESAS Completed by: provider   Fatigue: 5       Pain: 0   Anxiety: 0 Nausea: 0     Dyspnea: 4     Constipation: No              PHYSICAL EXAM:     From RN flowsheet:  Wt Readings from Last 3 Encounters:   11/03/22 61.2 kg (135 lb)   08/27/22 63 kg (139 lb)   12/05/17 78 kg (172 lb)     Blood pressure (!) 126/41, pulse 97, temperature 97.7 °F (36.5 °C), resp. rate 29, height 5' 7\" (1.702 m), weight 61.2 kg (135 lb), SpO2 100 %.     Pain Scale 1: Numeric (0 - 10)  Pain Intensity 1: 0                   Constitutional: awake, alert, NAD, appears acutely ill  Eyes: pupils equal, anicteric  ENMT: no nasal discharge, moist mucous membranes  Cardiovascular: regular rhythm, distal pulses intact  Respiratory: breathing not labored, symmetric, on NC  Gastrointestinal: soft non-tender   Musculoskeletal: no deformity, no tenderness to palpation  Skin: warm, dry  Neurologic: following commands, moving all extremities, oriented x 4  Psychiatric: flat affect, no hallucinations       HISTORY:     Active Problems:    Hypokalemia (11/2/2022)      Hypothermia (11/2/2022)      Pneumonia (11/2/2022)      CLAUS (acute kidney injury) ([facility] (11/2/2022)      Septic shock ([facility] (11/2/2022)    Past Medical History:   Diagnosis Date    Endocrine disease     Hypertension History reviewed. No pertinent surgical history. History reviewed. No pertinent family history. History reviewed, no pertinent family history.   Social History     Tobacco Use    Smoking status: Not on file    Smokeless tobacco: Not on file   Substance Use Topics    Alcohol use: Not on file     No Known Allergies   Current Facility-Administered Medications   Medication Dose Route Frequency    sodium chloride (23.4%) 0.9 % in dextrose 10% 1,040 mL infusion   IntraVENous CONTINUOUS    0.9% sodium chloride infusion 250 mL  250 mL IntraVENous PRN    insulin lispro (HUMALOG) injection   SubCUTAneous AC&HS    insulin glargine (LANTUS) injection 12 Units  0.2 Units/kg SubCUTAneous QHS    [START ON 11/4/2022] Vancomycin Random level due 11/4/22 at 0400  1 Each Other ONCE    vancomycin (VANCOCIN) 750 mg in 0.9% sodium chloride 250 mL (VIAL-MATE)  750 mg IntraVENous Q24H    piperacillin-tazobactam (ZOSYN) 3.375 g in 0.9% sodium chloride (MBP/ADV) 100 mL MBP  3.375 g IntraVENous Q8H    sodium chloride (NS) flush 5-10 mL  5-10 mL IntraVENous PRN    Vancomycin - Pharmacy to Dose  1 Each Other Rx Dosing/Monitoring    NOREPINephrine (LEVOPHED) 8 mg in 5% dextrose 250mL (32 mcg/mL) infusion  0.5-16 mcg/min IntraVENous TITRATE    [START ON 11/4/2022] levoFLOXacin (LEVAQUIN) 750 mg in D5W IVPB  750 mg IntraVENous Q48H    ELECTROLYTE REPLACEMENT PROTOCOL - Potassium Renal Dosing  1 Each Other PRN    ELECTROLYTE REPLACEMENT PROTOCOL - Magnesium   1 Each Other PRN    ELECTROLYTE REPLACEMENT PROTOCOL  - Phosphorus Renal Dosing  1 Each Other PRN    ELECTROLYTE REPLACEMENT PROTOCOL - Calcium   1 Each Other PRN    glucose chewable tablet 16 g  4 Tablet Oral PRN    glucagon (GLUCAGEN) injection 1 mg  1 mg IntraMUSCular PRN    dextrose 10% infusion 0-250 mL  0-250 mL IntraVENous PRN    pantoprazole (PROTONIX) 40 mg in 0.9% sodium chloride 10 mL injection  40 mg IntraVENous Q12H    [Held by provider] heparin (porcine) injection 5,000 Units  5,000 Units SubCUTAneous Q8H          LAB AND IMAGING FINDINGS:     Lab Results   Component Value Date/Time    WBC 5.0 11/03/2022 05:39 AM    HGB 6.5 (L) 11/03/2022 06:40 AM    PLATELET 65 (L) 64/94/4379 05:39 AM     Lab Results   Component Value Date/Time    Sodium 146 (H) 11/03/2022 05:39 AM    Potassium 4.5 11/03/2022 05:39 AM    Chloride 122 (H) 11/03/2022 05:39 AM    CO2 15 (L) 11/03/2022 05:39 AM    BUN 32 (H) 11/03/2022 05:39 AM    Creatinine 1.67 (H) 11/03/2022 05:39 AM    Calcium 7.7 (L) 11/03/2022 05:39 AM    Magnesium 1.8 11/03/2022 05:39 AM      Lab Results   Component Value Date/Time    Alk. phosphatase 131 (H) 11/03/2022 05:39 AM    Protein, total 5.5 (L) 11/03/2022 05:39 AM    Albumin 1.3 (L) 11/03/2022 05:39 AM    Globulin 4.2 (H) 11/03/2022 05:39 AM     Lab Results   Component Value Date/Time    INR 1.2 08/27/2022 05:45 PM    Prothrombin time 15.2 08/27/2022 05:45 PM      No results found for: IRON, FE, TIBC, IBCT, PSAT, FERR   No results found for: PH, PCO2, PO2  No components found for: GLPOC   No results found for: CPK, CKMB             Total time: 70 minutes  Counseling / coordination time, spent as noted above:   > 50% counseling / coordination?: yes, patient, family, and medical team    Prolonged service was provided for  []30 min   []75 min in face to face time in the presence of the patient, spent as noted above.   Time Start:   Time End:

## 2022-11-03 NOTE — PROGRESS NOTES
[facility] received and shift assessment completed per flow sheet. In bed, alert and orientated, dyspnea at rest, hong RODRIGUES in place at 15L, NAD.     2030 - Called nuclear tech, will attempt scan between 2010-8742.    2208 - FSBS 56, notified Dr. Belva Hamman, D10 bolus administered per order. 2330 - FSBS 165, transported with cardiac monitor in place per RN for VQ scan. 46 - Returned from VQ scan.     0021 - Reassessment complete. In bed resting, NAD.     0035 - FSBS 64, notified Dr. Belva Hamman, D10 bolus administered per order. 0749 - Reassessment complete. In bed resting, NAD.

## 2022-11-03 NOTE — CONSULTS
Hematology / Oncology Initial Consult Note    Admit Date: 11/2/2022    Reason for Consult: Anemia/Thrombocytopenia    Requesting Physician: Dr. Yves Adler    Assessment:     Elana Sahni is a [de-identified] y.o., BLACK/, male, who I have been asked to see for cytopenias. Active Problems:    Hypokalemia (11/2/2022)      Hypothermia (11/2/2022)      Pneumonia (11/2/2022)      CLAUS (acute kidney injury) ([facility] (11/2/2022)      Septic shock ([facility] [address] (11/2/2022)      Severe Sepsis/COVID: on pressor support. Empiric antibiotics. Not neutropenic so will not need GCSF. 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 Followed at [address] [place] [address] with note of neutropenias from treatment. CT CAP on 11/2/22 with good response to treatment without a large burden of disease. Normocytic anemia: due to chemotherapy. Note of prior need for IV Iron. Will send labs to evaluate. Will follow stool guaiac  Thrombocytopenia: likely multifactorial from prior chemo and new infection. Stable. Will eval for DIC and administer Cryo if fibrinogen <100    Plan:     - No plan for chemotherapy while inhouse  - GCSF not indicated as not neutropenic  - Will send labs to optimize Hg levels  - Eval for DIC  - Transfuse to maintain Hg>7  - Recommend DVT ppx with SCDs given COVID  - Will continue to follow    Carlton Betancur MD  [facility]  Cell [phone]      History of Present Illness: Elana Sahni is a [de-identified] y.o. male with Stage IV Colon Cancer on treatment with panitumumab/Irinotecan at [address] [place] [address], with treatment-related cytopenias, history of prostate cancer under surveillance, Stage III CKD, HTN, HLD, who presented to the ED with SOB x2 days. He was recently hospitalized with COVID-pneumonia and was treated with steroids. Two days ago, he again felt weak and tired. In the ED, he was noted to be hypotensive and hypoxic. He was in Afib with RVR.  He was started on Levophed and admitted to the ICU. Repeat COVID testing was positive. VQ scan and PVL was negative for PE/DVT. ROS:    Constitutional: No fever, chills, diaphoresis, activity change, appetite change, fatigue or unexpected weight change. HEENT: No sore throat, rhinorrhea,or visual disturbance. Respiratory: No cough, chest tightness, shortness of breath. Cardiovascular:No chest pain, palpitations. Gastrointestinal:No nausea, vomiting, diarrhea, constipation, early satiety, abdominal distention, abdominal pain. Genitourinary: No dysuria, urgency, frequency, hematuria, flank pain, difficulty urinating. Neurological: No headache, dizziness, weakness, tingling and numbness or fall. Musculoskeletal: No bone pain or back pain. No arthralgia or myalgia. No bruise/bleed easily. No extremity swelling. Past Medical History:   Diagnosis Date    Endocrine disease     Hypertension        History reviewed. No pertinent surgical history. History reviewed. No pertinent family history.     Social History     Socioeconomic History    Marital status:        Current Facility-Administered Medications   Medication Dose Route Frequency    sodium chloride (23.4%) 0.9 % in dextrose 10% 1,040 mL infusion   IntraVENous CONTINUOUS    0.9% sodium chloride infusion 250 mL  250 mL IntraVENous PRN    insulin lispro (HUMALOG) injection   SubCUTAneous AC&HS    insulin glargine (LANTUS) injection 12 Units  0.2 Units/kg SubCUTAneous QHS    [START ON 11/4/2022] Vancomycin Random level due 11/4/22 at 0400  1 Each Other ONCE    vancomycin (VANCOCIN) 750 mg in 0.9% sodium chloride 250 mL (VIAL-MATE)  750 mg IntraVENous Q24H    piperacillin-tazobactam (ZOSYN) 3.375 g in 0.9% sodium chloride (MBP/ADV) 100 mL MBP  3.375 g IntraVENous Q8H    sodium chloride (NS) flush 5-10 mL  5-10 mL IntraVENous PRN    Vancomycin - Pharmacy to Dose  1 Each Other Rx Dosing/Monitoring    NOREPINephrine (LEVOPHED) 8 mg in 5% dextrose 250mL (32 mcg/mL) infusion  0.5-16 mcg/min IntraVENous TITRATE    [START ON 2022] levoFLOXacin (LEVAQUIN) 750 mg in D5W IVPB  750 mg IntraVENous Q48H    ELECTROLYTE REPLACEMENT PROTOCOL - Potassium Renal Dosing  1 Each Other PRN    ELECTROLYTE REPLACEMENT PROTOCOL - Magnesium   1 Each Other PRN    ELECTROLYTE REPLACEMENT PROTOCOL  - Phosphorus Renal Dosing  1 Each Other PRN    ELECTROLYTE REPLACEMENT PROTOCOL - Calcium   1 Each Other PRN    glucose chewable tablet 16 g  4 Tablet Oral PRN    glucagon (GLUCAGEN) injection 1 mg  1 mg IntraMUSCular PRN    dextrose 10% infusion 0-250 mL  0-250 mL IntraVENous PRN    pantoprazole (PROTONIX) 40 mg in 0.9% sodium chloride 10 mL injection  40 mg IntraVENous Q12H    [Held by provider] heparin (porcine) injection 5,000 Units  5,000 Units SubCUTAneous Q8H       Prior to Admission medications    Medication Sig Start Date End Date Taking? Authorizing Provider   simvastatin (ZOCOR) 20 mg tablet Take  by mouth nightly. Ibrahima Langston MD   lisinopril (PRINIVIL, ZESTRIL) 20 mg tablet Take  by mouth daily. Ibrahima Langston MD   aspirin delayed-release 81 mg tablet Take  by mouth daily. Ibrahima Langston MD   metFORMIN (GLUCOPHAGE) 1,000 mg tablet Take 1,000 mg by mouth two (2) times daily (with meals).     Ibrahima Langston MD       No Known Allergies    ECOG PS:    Physical Exam:    Temp (24hrs), Av.3 °F (35.7 °C), Min:91.3 °F (32.9 °C), Max:98.2 °F (36.8 °C)  [email or web address]      General: Alert , Oriented, in no distress  HEENT: no pallor, anicteric sclera, oral pharynx without lesion   No cervical, supraclavicular, axillary and inguinal lymphadenopathy palpated  Heart: regular rate, and rhythm, without murmur, gallop or rubbing  Lungs:breathing comfortably on room air, clear to auscultation and percussion bilaterally  ABD: bowel sound present, soft, nondistended, nontender, no hepatosplenomegaly or mass  Extremities: warm, well perfused, no edema  MSK: no tenderness along the spine or long bones  Skin: No rash  Neuro: non-focal    Imaging:     NM Perefusion scan   Negative for PE    PVL Negative for DVT

## 2022-11-04 PROBLEM — C18.9: Status: ACTIVE | Noted: 2022-11-04

## 2022-11-04 PROBLEM — C79.51: Status: ACTIVE | Noted: 2022-01-01

## 2022-11-04 PROBLEM — U07.1 COVID-19: Status: ACTIVE | Noted: 2022-11-04

## 2022-11-04 NOTE — PROGRESS NOTES
[facility]  (A Division of [address] [place] [address])    Follow-up Note  My partner Reji Krishnan MD will  ID service tomorrow morning. Date of Admission: 11/2/2022       Date of Note:  11/4/2022    Summary:      Jazzmine Rodrigues is a [de-identified] y.o. BLACK/ with PMH of colon/prostate cancer with bone mets undergoing chemo, CKD, episodes of cytopenia's per heme notes,  recent brief admission from Oct 23-24 at [facility] with COVID 19 Pneumonia. He was Dc'ed with oral doxy on discharge. He completed course end of Oct.     He was brought in to ED on 11.2 with weakness of 2 days, progressive SOB and weakness and found hypoxic and tachycardic. He was placed on HFNC at 15 litres, given fluid resucitation and pressor on. Placed on Vanco/Levofloxacin and Zosyn. He has diffuse GGO on his CT, which was also apparent from his Oct evaluation at [facility]. CC: \"Breathing easier\"  HPI:  Chart reviewed/pt seen  Feeling better, not hungry, but eating more  Tm<100  SaO2 running 95-99% on same 3L/m NC      Current Antimicrobials:    Prior Antimicrobials:  Levofloxacin IV (11/2-) #2  Pip/tzb IV (11/2-) #2  Vanco IV (11/2-) #2 Doxycycline and Augmentin 10/23 X1 week       Assessment: Rec / Plan:   COVID  11/3:  PCT 2.65ng/mL  I think this is all viral/COVID despite the minimally elevated PCT. Tends to hit hard at the end of the first week of infection/going into second week where he is at currently. I started the steroids last night while prepping as he remains hypoxic. The CT findings are c/w COVID. I stopped both the vanco and the pip/tzb at this time; am okay with continuing the levofloxacin for now, but would have low threshold for stopping this as well over next few days. Give it (steroid) some time.   With the improvement over last few days on oxygenation, he doesn't need tocilizumab at this time. ->    Stop vanco+ pip/tzb (hurts the renal function as well as platelets); doesn't help the COVID. Steroids started last night; I'd give through this admission. Consider this infection his current booster -- take one of the bivalent Boosters in 2-3m. CLAUS    Metastatic Colon CA (bone)    DMT2 fair control A1c 7.8%    Resp failure with hypoxia        Microbiology:  11/3 - Resp sent      Legionella/Spneumo Ag (-)     11/2 - UA (-)      COVID (+)      Flu (-)      BCx x2 (-)      Lines / Catheters: R chest [place] and peripheral        Patient Active Problem List   Diagnosis Code    Hypokalemia E87.6    Hypothermia T68. XXXA    Pneumonia J18.9    CLAUS (acute kidney injury) ([facility] [address] N17.9    Septic shock (HCC) A41.9, R65.21    [facility] C18.9, C79.51    COVID-19 U07.1       Current Facility-Administered Medications   Medication Dose Route Frequency    ferrous sulfate tablet 325 mg  1 Tablet Oral DAILY WITH BREAKFAST    0.9% sodium chloride infusion 250 mL  250 mL IntraVENous PRN    insulin lispro (HUMALOG) injection   SubCUTAneous AC&HS    insulin glargine (LANTUS) injection 12 Units  0.2 Units/kg SubCUTAneous QHS    levoFLOXacin (LEVAQUIN) 750 mg in D5W IVPB  750 mg IntraVENous Q48H    dexamethasone (DECADRON) 4 mg/mL injection 6 mg  6 mg IntraVENous Q24H    sodium chloride (NS) flush 5-10 mL  5-10 mL IntraVENous PRN    NOREPINephrine (LEVOPHED) 8 mg in 5% dextrose 250mL (32 mcg/mL) infusion  0.5-16 mcg/min IntraVENous TITRATE    ELECTROLYTE REPLACEMENT PROTOCOL - Potassium Renal Dosing  1 Each Other PRN    ELECTROLYTE REPLACEMENT PROTOCOL - Magnesium   1 Each Other PRN    ELECTROLYTE REPLACEMENT PROTOCOL  - Phosphorus Renal Dosing  1 Each Other PRN    ELECTROLYTE REPLACEMENT PROTOCOL - Calcium   1 Each Other PRN    glucose chewable tablet 16 g  4 Tablet Oral PRN    glucagon (GLUCAGEN) injection 1 mg  1 mg IntraMUSCular PRN    dextrose 10% infusion 0-250 mL  0-250 mL IntraVENous PRN    pantoprazole (PROTONIX) 40 mg in 0.9% sodium chloride 10 mL injection  40 mg IntraVENous Q12H    [Held by provider] heparin (porcine) injection 5,000 Units  5,000 Units SubCUTAneous Q8H         Review of Systems - General ROS: positive for  - fatigue and malaise  negative for - chills, fever, or night sweats  Respiratory ROS: positive for - cough and shortness of breath  negative for - hemoptysis or sputum changes  Cardiovascular ROS: positive for - dyspnea on exertion and shortness of breath  negative for - chest pain  Gastrointestinal ROS: no abdominal pain, change in bowel habits, or black or bloody stools       Objective:    Visit Vitals  /62   Pulse (!) 103   Temp 97.6 °F (36.4 °C)   Resp (!) 34   Ht 5' 7\" (1.702 m)   Wt 61.2 kg (135 lb)   SpO2 99%   BMI 21.14 kg/m²       Temp (24hrs), Av.5 °F (36.4 °C), Min:97.3 °F (36.3 °C), Max:97.9 °F (36.6 °C)      GEN: Thin AAM in NAD  HEENT: anicteric  CHEST: CTA  CVS:RRR  ABD: NT NABS  EXT: no acral ischemia         Lab results:    Chemistry  Recent Labs     22  0515 22  0539 22  1200   * 73* 300*    146* 142   K 4.0 4.5 3.2*   * 122* 116*   CO2 12* 15* 13*   BUN 28* 32* 38*   CREA 1.63* 1.67* 1.79*   CA 7.6* 7.7* 7.5*   AGAP 11 9 13   BUCR 17 19 21*   * 131* 163*   TP 5.8* 5.5* 5.4*   ALB 1.3* 1.3* 1.6*   GLOB 4.5* 4.2* 3.8   AGRAT 0.3* 0.3* 0.4*       CBC w/ Diff  Recent Labs     22  0515 22  1440 22  0640 22  0539 22  1200   WBC 6.7  --   --  5.0 3.7*   RBC 2.72*  --   --  2.19* 2.73*   HGB 8.2* 8.4* 6.5* 6.6* 8.3*   HCT 23.8* 24.5* 19.1* 19.8* 24.3*   PLT 59*  --   --  65* 60*   GRANS 83*  --   --  77* 58   LYMPH 5*  --   --  12* 10*   EOS 0  --   --  1 0       Microbiology  All Micro Results       Procedure Component Value Units Date/Time    CULTURE, URINE [[id number]] Collected: 22 1440    Order Status: Completed Specimen: Cath Urine Updated: 22 1116     Special Requests: NO SPECIAL REQUESTS        Culture result: No growth (<1,000 CFU/ML)       CULTURE, BLOOD [[id number]] Collected: 11/02/22 1207    Order Status: Completed Specimen: Blood Updated: 11/04/22 0722     Special Requests: NO SPECIAL REQUESTS        Culture result: NO GROWTH 2 DAYS       CULTURE, BLOOD [[id number]] Collected: 11/02/22 1207    Order Status: Completed Specimen: Blood Updated: 11/04/22 0722     Special Requests: NO SPECIAL REQUESTS        Culture result: NO GROWTH 2 DAYS       LEGIONELLA PNEUMOPHILA AG, URINE [[id number]] Collected: 11/03/22 1700    Order Status: Completed Specimen: Urine, random Updated: 11/03/22 2120     Legionella Ag, urine Negative       STREP PNEUMO AG, URINE [[id number]] Collected: 11/03/22 1700    Order Status: Completed Specimen: Urine, random Updated: 11/03/22 2120     Strep pneumo Ag, urine Negative       CULTURE, RESPIRATORY/SPUTUM/BRONCH Walker Face STAIN [[id number]] Collected: 11/03/22 2039    Order Status: Sent Specimen: Sputum Updated: 11/03/22 2053    COVID-19 WITH INFLUENZA A/B [[id number]]  (Abnormal) Collected: 11/02/22 1346    Order Status: Completed Specimen: Nasopharyngeal Updated: 11/02/22 1448     SARS-CoV-2 by PCR Detected        Comment: Positive results are indicative of the presence of SARS-CoV-2. Clinical correlation with patient history and other diagnostic information is necessary to determine patient infection status. Positive results do not rule out bacterial infection or co-infection with other pathogens. CALLED TO AND CORRECTLY REPEATED BY:  ANJEL CALLAHAN ED 11/02/22 AT 1448 BY ANNESSA          Influenza A by PCR Not detected        Influenza B by PCR Not detected        Comment: NOTE: Influenza A and Influenza B negative results should be considered presumptive in samples that have a positive SARS-CoV-2 result. Consider re-testing with an alternate FDA-approved test if clinically indicated. Testing was performed using gracy Alize SARS-CoV-2 and Influenza A/B nucleic acid assay.   This test is a multiplex Real-Time Reverse Transcriptase Polymerase Chain Reaction (RT-PCR) based in vitro diagnostic test intended for the qualitative detection of nucleic acids from SARS-CoV-2, Influenza A, and Influenza B in nasopharyngeal for use under the FDA's Emergency Use Authorization(EAU) only.        Fact sheet for Patients: FindDrives[employer]  Fact sheet for Healthcare Providers: FindD[employer]         COVID-19 RAPID TEST [[id number]]     Order Status: Taj Duenas MD  Cell [phone]  [facility]   11/4/2022   1:00 PM

## 2022-11-04 NOTE — PROGRESS NOTES
Hospitalist Progress Note    Patient: Keith Stiles MRN: [id number]  CSN: [id number]    YOB: 1940  Age: [de-identified] y.o. Sex: male    DOA: 11/2/2022 LOS:  LOS: 2 days          Chief Complaint:    sepsis      Assessment/Plan      [de-identified]year old gentleman with cancer came with covid positive and recent covid known infection, as well as possible pneumonia, CLAUS, sepsis    Improved today  Weaning from pressors, down to one low dose this am  Eating breakfast    Septic shock  Improving  Due to covid and pneumonia    Covid pneumonia  Decadron  Supportive care  isolation    Hypoxic respiratory failure  pneumonia  VQ neg for Pe     Metastatic colon cancer to bone  Hx of prostate cancer  Thrombocytopenia   Severe anemia s/p transfusion  Iron deficiency    Use SCD for DVT prophylaxis    Hematology consult completed  Occult stool check     CLAUS on CKD 3     Diabetes-Started on Lantus and sliding scale insulin      Hypophosphatemia-protocol repletion     Palliative care consult- full code     ICU care       Disposition :  Patient Active Problem List   Diagnosis Code    Hypokalemia E87.6    Hypothermia T68. XXXA    Pneumonia J18.9    CLAUS (acute kidney injury) ([facility] [address] N17.9    Septic shock (HCC) A41.9, R65.21    Carcinoma of colon metastatic to bone ([facility] [address] C18.9, C79.51    COVID-19 U07.1       Subjective:    I feel better  Breathing easier  Eating breakfast  Denies abd pain, cough, CP    Review of systems:    Constitutional: denies fevers, chills    Gastrointestinal: denies nausea, vomiting, diarrhea      Vital signs/Intake and Output:  Visit Vitals  /62   Pulse (!) 103   Temp 97.6 °F (36.4 °C)   Resp (!) 34   Ht 5' 7\" (1.702 m)   Wt 61.2 kg (135 lb)   SpO2 99%   BMI 21.14 kg/m²     Current Shift:  11/04 0701 - 11/04 1900  In: 194.7 [P.O.:100; I.V.:94.7]  Out: 100 [Urine:100]  Last three shifts:  11/02 1901 - 11/04 0700  In: 3956.4 [P.O.:250;  I.V.:3406.4]  Out: 2320 [Urine:2320]    Exam:    General: elderly AAM,  alert, NAD, OX3  CVS:Regular rate and rhythm, no M/R/G, S1/S2 heard, no thrill  Lungs:Clear to auscultation bilaterally, no wheezes, rhonchi, or rales  Abdomen: Soft, Nontender, No distention, Normal Bowel sounds  Extremities: No C/C/E, pulses palpable 2+  Neuro:grossly normal , follows commands  Psych:appropriate                Labs: Results:       Chemistry Recent Labs     11/04/22  0515 11/03/22  0539 11/02/22  1200   * 73* 300*    146* 142   K 4.0 4.5 3.2*   * 122* 116*   CO2 12* 15* 13*   BUN 28* 32* 38*   CREA 1.63* 1.67* 1.79*   CA 7.6* 7.7* 7.5*   AGAP 11 9 13   BUCR 17 19 21*   * 131* 163*   TP 5.8* 5.5* 5.4*   ALB 1.3* 1.3* 1.6*   GLOB 4.5* 4.2* 3.8   AGRAT 0.3* 0.3* 0.4*      CBC w/Diff Recent Labs     11/04/22  0515 11/03/22  1440 11/03/22  0640 11/03/22  0539 11/02/22  1200   WBC 6.7  --   --  5.0 3.7*   RBC 2.72*  --   --  2.19* 2.73*   HGB 8.2* 8.4* 6.5* 6.6* 8.3*   HCT 23.8* 24.5* 19.1* 19.8* 24.3*   PLT 59*  --   --  65* 60*   GRANS 83*  --   --  77* 58   LYMPH 5*  --   --  12* 10*   EOS 0  --   --  1 0      Cardiac Enzymes No results for input(s): CPK, CKND1, KIMBERLY in the last 72 hours. No lab exists for component: CKRMB, TROIP   Coagulation No results for input(s): PTP, INR, APTT, INREXT, INREXT in the last 72 hours. Lipid Panel No results found for: CHOL, CHOLPOCT, CHOLX, CHLST, CHOLV, 231057, HDL, HDLP, LDL, LDLC, DLDLP, 930287, VLDLC, VLDL, TGLX, TRIGL, TRIGP, TGLPOCT, CHHD, CHHDX   BNP No results for input(s): BNPP in the last 72 hours.    Liver Enzymes Recent Labs     11/04/22  0515   TP 5.8*   ALB 1.3*   *      Thyroid Studies No results found for: T4, T3U, TSH, TSHEXT, TSHEXT     Procedures/imaging: see electronic medical records for all procedures/Xrays and details which were not copied into this note but were reviewed prior to creation of Nish Nettles MD

## 2022-11-04 NOTE — PROGRESS NOTES
Problem: Airway Clearance - Ineffective  Goal: Achieve or maintain patent airway  Outcome: Progressing Towards Goal     Problem: Gas Exchange - Impaired  Goal: Absence of hypoxia  Outcome: Progressing Towards Goal  Goal: Promote optimal lung function  Outcome: Progressing Towards Goal     Problem: Breathing Pattern - Ineffective  Goal: Ability to achieve and maintain a regular respiratory rate  Outcome: Progressing Towards Goal     Problem:  Body Temperature -  Risk of, Imbalanced  Goal: Ability to maintain a body temperature within defined limits  Outcome: Progressing Towards Goal  Goal: Will regain or maintain usual level of consciousness  Outcome: Progressing Towards Goal  Goal: Complications related to the disease process, condition or treatment will be avoided or minimized  Outcome: Progressing Towards Goal     Problem: Isolation Precautions - Risk of Spread of Infection  Goal: Prevent transmission of infectious organism to others  Outcome: Progressing Towards Goal     Problem: Nutrition Deficits  Goal: Optimize nutrtional status  Outcome: Progressing Towards Goal     Problem: Risk for Fluid Volume Deficit  Goal: Maintain normal heart rhythm  Outcome: Progressing Towards Goal  Goal: Maintain absence of muscle cramping  Outcome: Progressing Towards Goal  Goal: Maintain normal serum potassium, sodium, calcium, phosphorus, and pH  Outcome: Progressing Towards Goal     Problem: Loneliness or Risk for Loneliness  Goal: Demonstrate positive use of time alone when socialization is not possible  Outcome: Progressing Towards Goal     Problem: Fatigue  Goal: Verbalize increase energy and improved vitality  Outcome: Progressing Towards Goal     Problem: Patient Education: Go to Patient Education Activity  Goal: Patient/Family Education  Outcome: Progressing Towards Goal     Problem: Risk for Spread of Infection  Goal: Prevent transmission of infectious organism to others  Description: Prevent the transmission of infectious organisms to other patients, staff members, and visitors. Outcome: Progressing Towards Goal     Problem: Patient Education:  Go to Education Activity  Goal: Patient/Family Education  Outcome: Progressing Towards Goal     Problem: Pressure Injury - Risk of  Goal: *Prevention of pressure injury  Description: Document Hussain Scale and appropriate interventions in the flowsheet. Outcome: Progressing Towards Goal  Note: Pressure Injury Interventions:       Moisture Interventions: Absorbent underpads, Apply protective barrier, creams and emollients, Check for incontinence Q2 hours and as needed, Internal/External urinary devices    Activity Interventions: Pressure redistribution bed/mattress(bed type)    Mobility Interventions: HOB 30 degrees or less, Pressure redistribution bed/mattress (bed type)    Nutrition Interventions: Document food/fluid/supplement intake    Friction and Shear Interventions: HOB 30 degrees or less, Lift sheet, Lift team/patient mobility team                Problem: Patient Education: Go to Patient Education Activity  Goal: Patient/Family Education  Outcome: Progressing Towards Goal     Problem: Falls - Risk of  Goal: *Absence of Falls  Description: Document Luciana Fall Risk and appropriate interventions in the flowsheet.   Outcome: Progressing Towards Goal  Note: Fall Risk Interventions:  Mobility Interventions: Bed/chair exit alarm, Strengthening exercises (ROM-active/passive), Assess mobility with egress test         Medication Interventions: Bed/chair exit alarm, Evaluate medications/consider consulting pharmacy    Elimination Interventions: Bed/chair exit alarm, Call light in reach, Patient to call for help with toileting needs, Toileting schedule/hourly rounds              Problem: Patient Education: Go to Patient Education Activity  Goal: Patient/Family Education  Outcome: Progressing Towards Goal     Problem: Diabetes Self-Management  Goal: *Disease process and treatment process  Description: Define diabetes and identify own type of diabetes; list 3 options for treating diabetes. Outcome: Progressing Towards Goal  Goal: *Incorporating nutritional management into lifestyle  Description: Describe effect of type, amount and timing of food on blood glucose; list 3 methods for planning meals. Outcome: Progressing Towards Goal  Goal: *Incorporating physical activity into lifestyle  Description: State effect of exercise on blood glucose levels. Outcome: Progressing Towards Goal  Goal: *Developing strategies to promote health/change behavior  Description: Define the ABC's of diabetes; identify appropriate screenings, schedule and personal plan for screenings. Outcome: Progressing Towards Goal  Goal: *Using medications safely  Description: State effect of diabetes medications on diabetes; name diabetes medication taking, action and side effects. Outcome: Progressing Towards Goal  Goal: *Monitoring blood glucose, interpreting and using results  Description: Identify recommended blood glucose targets  and personal targets. Outcome: Progressing Towards Goal  Goal: *Prevention, detection, treatment of acute complications  Description: List symptoms of hyper- and hypoglycemia; describe how to treat low blood sugar and actions for lowering  high blood glucose level. Outcome: Progressing Towards Goal  Goal: *Prevention, detection and treatment of chronic complications  Description: Define the natural course of diabetes and describe the relationship of blood glucose levels to long term complications of diabetes.   Outcome: Progressing Towards Goal  Goal: *Developing strategies to address psychosocial issues  Description: Describe feelings about living with diabetes; identify support needed and support network  Outcome: Progressing Towards Goal  Goal: *Insulin pump training  Outcome: Progressing Towards Goal  Goal: *Sick day guidelines  Outcome: Progressing Towards Goal  Goal: *Patient Specific Goal (EDIT GOAL, INSERT TEXT)  Outcome: Progressing Towards Goal     Problem: Patient Education: Go to Patient Education Activity  Goal: Patient/Family Education  Outcome: Progressing Towards Goal     Problem: Hypotension  Goal: *Blood pressure within specified parameters  Outcome: Progressing Towards Goal  Goal: *Fluid volume balance  Outcome: Progressing Towards Goal  Goal: *Labs within defined limits  Outcome: Progressing Towards Goal     Problem: Patient Education: Go to Patient Education Activity  Goal: Patient/Family Education  Outcome: Progressing Towards Goal

## 2022-11-04 NOTE — PROGRESS NOTES
[address] [facility] Pharmacokinetic Monitoring Service - Vancomycin    Consulting Provider: Jasmina Hansen   Indication: HCAP  Target Concentration: Goal AUC/ANNALISE 400-600 mg*hr/L  Day of Therapy: 3  Additional Antimicrobials: Zosyn;Levaquin    Pertinent Laboratory Values: Wt Readings from Last 1 Encounters:   11/03/22 61.2 kg (135 lb)     Temp Readings from Last 1 Encounters:   11/04/22 97.5 °F (36.4 °C)     No components found for: PROCAL  Estimated Creatinine Clearance: 31.3 mL/min (A) (based on SCr of 1.63 mg/dL (H)).   Recent Labs     11/04/22  0515 11/03/22  0539   WBC 6.7 5.0       Assessment:  Date/Time Current Dose Concentration Timing of Concentration (h) AUC   11/04/2022 0920 Vanc 750mg IV Q24H 14.7 mcg/mL 11/04/2022 0515 483 mg/L.hr   Note: Serum concentrations collected for AUC dosing may appear elevated if collected in close proximity to the dose administered, this is not necessarily an indication of toxicity    Plan:  Current dosing regimen is therapeutic  Continue Vancomycin 750mg IV Q24H for  mg/L.hr.  Pharmacy will continue to monitor patient and adjust therapy as indicated      NIKI Aguayo  11/4/2022 9:19 AM

## 2022-11-04 NOTE — PROGRESS NOTES
[facility]  Pulmonary, Critical Care, and Sleep Medicine    Name: Bean Ruby MRN: [id number]   : 1940 Hospital: [facility] [place]    Date: 2022  Room: [address] [place] [address] Note                                              Consult requesting physician: Dr. Sidney Harrison  Reason for Consult: Acute hypoxic respiratory failure    IMPRESSION:   Acute hypoxic respiratory failure - J96.01  Septic shock - A41.9, R65.21  COVID19 pneumonia - U07.1, J12.82  Immunocompromised status - D84.9  Leukopenia - D72.819  Anemia - D64.9  Thrombocytopenia - D69.6  CLAUS on CKD - N17.9  Hypokalemia  DM - uncontrolled with hyperglycemia  Patient Active Problem List   Diagnosis Code    Hypokalemia E87.6    Hypothermia T68. 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 Initially required HFNC via salter NC  CXR and CT chest showed multilobar GGO  Keep SPO2 >=92%. On O2 via NC at 3 Lpm. Wean O2 flow as tolerated for goal SPO2  HOB 30 degree elevation all the time. Aggressive pulmonary toileting. Aspiration precautions. Incentive spirometry encouraged  Self proning when stable  Protecting airway  CXR for follow up in AM 11/3/22 - stable    CVS: actively titrate vasopressor for goal SBP > 100 mm Hg or MAP > 65 mm Hg  Normal cardiac troponin   ECHO 11/3/22    Left Ventricle: Normal left ventricular systolic function with a visually estimated EF of 55 - 60%. Left ventricle is smaller than normal. Normal wall thickness. Normal wall motion. Grade I diastolic dysfunction present with normal LV EF. Tricuspid Valve: Valve structure is normal. No regurgitation. No stenosis noted.  Unable to assess RVSP due to inadequate or insignificant tricuspid regurgitation    ID: COVID19 pneumonia  On steroid - Dexamethasone 6 mg daily  Elevated Procalcitonin, LD  Lactic acid normal  Patient is out the window for Remdesivir or Paxlovid or monoclonal antibodies  Bl cx 11/2/22: NGTD  Urine culture 11/2/22: NGTD  Sputum cx 11/3/22: in process  Antibiotics: Levofloxacin; Deescalate off of Zosyn, vancomycin per ID  Sepsis bundle and protocol followed. Fluid resuscitation as needed. Follow cultures. Hematology/Oncology: monitor CBC  Known to have chronic anemia  Transfused PRBC x 1 on 11/3/22 for Hgb > 7 goal  Thrombocytopenia likely related to chemorx  Any hematology consult as per clinical course    Renal: monitor renal function, lytes, UO  S. Na+ improved off of NS or 1/2 NS - monitor  Nephrology evaluation as per clinical course    GI/: PPI for GI prophylaxis  Diabetic diet as tolerated    Endocrine: Monitor FSBS. SSI and Lantus  No further hypoglycemia episode    Neurology: AAO x 3, generalized weak - no sensory or motor neurological deficit in limited exam  Recent mobility issues from generalized weakness    Pain/Sedation: avoid sedative, opiate, hypnotics if possible    Skin/Wound: as per nursing care    Electrolytes: Replace electrolytes per ICU electrolyte replacement protocol. Prophylaxis: DVT Prophylaxis: SCD. GI Prophylaxis: PPI. Restraints: none    Lines/Tubes: PIV, midline  Has mediport  Oliveira: 11/2/22 (Medically necessary for strict input/output monitoring in critically ill patient, will remove it when not needed. Oliveira bundle followed). Advance Directive/Palliative Care: Consulted. Full code per GD. Will defer respective systems problem management to primary and other respective consultant and follow patient in ICU with primary and other medical team.  Further recommendations will be based on the patient's response to recommended treatment and results of the investigation ordered.   Quality Care: PPI, DVT prophylaxis, HOB elevated, Infection control all reviewed and addressed. Care of plan d/w RN, RT, MDR, hospitalist team.  D/w patient (answered all questions to satisfaction). Providing daily update to daughter by calling her at listed number    High complexity decision making was performed during the evaluation of this patient at high risk for decompensation with multiple organ involvement. Total critical care time spent rendering care exclusive of procedures/family discussion/coordination of care: 36 minutes. Subjective/History of Present Illness:     Patient is a [de-identified] y.o. 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 Alberto Henley, anemia, HTN, HLD, CKD 3, DM presented to [facility] ER with persistent dyspnea, cough and chronic diarrhea. Patient is poor historian hence information received from GD. Per GD he is not feeling well \"for a while\", patient last month noted to have recurrent episodes of hypotension requiring his PCP to hold off, Magnesium was low; received hydration, blood transfusion and Magnesium through oncology infusion center. He developed cough, dyspnea, fatigue abt 3 weeks ago. He was taken to [facility] and dx with COVID19 infection and pneumonia per GD. There were few family members with Marija Wyman but no direct contact with patient per GD. After ER visit he was treated with steroid, antibiotics w/o improvement and was brought to [facility] ER. In ER, patient noted to be hypotensive, hypothermic. Labs showed elevated lactic acid requiring to start fluid, antibiotics and sepsis work up. He required Levophed support and admitted to ICU.  The patient denies fever, chills, night sweats, chest pain, wheezing or hemoptysis, palpitations, syncope, orthopnea, edema or paroxysmal nocturnal dyspnea, HA, neck stiffness, photophobia, sore throat, sinus pain or discharge, dysuria, nausea, vomiting, abdominal pain, rash, adenopathy, leg swelling or calf tenderness, urethral discharge, bleeding anywhere. 11/04/22:   Patient seen at bedside in ICU room #103  He reports improved stable dyspnea compared to yesterday on O2 via nasal cannula at 3 L/min  Denies any phlegm, hemoptysis, wheezing, CP, fever, chills  Reports cough is dry and stable  Actively titrated off of Levophed this noon for goal MAP  He denies any palpitation, orthopnea, PND, leg edema  No evidence of bleeding anywhere grossly. Hemoglobin stable  Low p.o. intake. Fair urine output  [facility] was not contacted by staff on anything about patient overnight. I/O last 24 hrs: Intake/Output Summary (Last 24 hours) at 11/4/2022 1614  Last data filed at 11/4/2022 1400  Gross per 24 hour   Intake 1073.73 ml   Output 1100 ml   Net -26.27 ml           History taken from patient, EMR     Review of Systems:  General ROS: negative for  - fever, chills, weight loss, fatigue and malaise  Cardiovascular ROS: negative for - chest pain, edema, murmur, orthopnea, palpitations or pnd  Gastrointestinal ROS: no nausea, vomiting, abdominal pain or black or bloody stools  Genito-Urinary ROS: no dysuria, trouble voiding, or hematuria  Dermatological ROS: negative for - pruritus, rash or skin lesion changes       No Known Allergies   Past Medical History:   Diagnosis Date    Endocrine disease     Hypertension       History reviewed. No pertinent surgical history. Social History     Tobacco Use    Smoking status: Not on file    Smokeless tobacco: Not on file   Substance Use Topics    Alcohol use: Not on file      History reviewed. No pertinent family history. Prior to Admission medications    Medication Sig Start Date End Date Taking? Authorizing Provider   simvastatin (ZOCOR) 20 mg tablet Take  by mouth nightly. Kian, MD Ibrahima   lisinopril (PRINIVIL, ZESTRIL) 20 mg tablet Take  by mouth daily. Ibrahima Langston MD   aspirin delayed-release 81 mg tablet Take  by mouth daily.     Other MD Ibrahima   metFORMIN (GLUCOPHAGE) 1,000 mg tablet Take 1,000 mg by mouth two (2) times daily (with meals). Other, MD Ibrahima     Current Facility-Administered Medications   Medication Dose Route Frequency    ferrous sulfate tablet 325 mg  1 Tablet Oral DAILY WITH BREAKFAST    insulin lispro (HUMALOG) injection   SubCUTAneous AC&HS    insulin glargine (LANTUS) injection 12 Units  0.2 Units/kg SubCUTAneous QHS    levoFLOXacin (LEVAQUIN) 750 mg in D5W IVPB  750 mg IntraVENous Q48H    dexamethasone (DECADRON) 4 mg/mL injection 6 mg  6 mg IntraVENous Q24H    NOREPINephrine (LEVOPHED) 8 mg in 5% dextrose 250mL (32 mcg/mL) infusion  0.5-16 mcg/min IntraVENous TITRATE    pantoprazole (PROTONIX) 40 mg in 0.9% sodium chloride 10 mL injection  40 mg IntraVENous Q12H    [Held by provider] heparin (porcine) injection 5,000 Units  5,000 Units SubCUTAneous Q8H         Objective:   Vital Signs:    Visit Vitals  BP (!) 106/57   Pulse 100   Temp 97.5 °F (36.4 °C)   Resp 24   Ht 5' 7\" (1.702 m)   Wt 61.2 kg (135 lb)   SpO2 98%   BMI 21.14 kg/m²       O2 Device: Nasal cannula   O2 Flow Rate (L/min): 3 l/min   Temp (24hrs), Av.5 °F (36.4 °C), Min:97.3 °F (36.3 °C), Max:97.9 °F (36.6 °C)       Intake/Output:   Last shift:      701 - 1900  In: 922 [P.O.:200; I.V.:429]  Out: 350 [Urine:350]    Last 3 shifts: 1901 -  07  In: 3956.4 [P.O.:250;  I.V.:3406.4]  Out: 2320 [Urine:2320]      Intake/Output Summary (Last 24 hours) at 2022 1614  Last data filed at 2022 1400  Gross per 24 hour   Intake 1073.73 ml   Output 1100 ml   Net -26.27 ml         Last 3 Recorded Weights in this Encounter    22 1133 22 1435 22 0910   Weight: 61.2 kg (135 lb) 62 kg (136 lb 11 oz) 61.2 kg (135 lb)       Recent Labs     22  1312   PHI 7.35   PCO2I 21.8*   PO2I 72*   HCO3I 12.2*   FIO2I 2         Physical Exam: Proper isolation precaution in place and may limit exam     General/Neurology: Alert, Awake, O x3, no sensory or motor neurological deficit in limited exam, on O2 via NC  Head:               Normocephalic, without obvious abnormality, atraumatic. Eye:                 EOM intact. No scleral icterus, no cyanosis. Nose:               No sinus tenderness  Throat:             Lips, mucosa, and tongue normal.  Neck:               Supple, symmetric. No lymphadenopathy. Trachea midline  Lung: Moderate air entry bilateral equal. Few basal scattered rhonchi. No wheezing. No stridors. No prolongded expiration. No accessory muscle use. Heart:              Regular rate & rhythm. S1 S2 present. No murmur. No JVD. Abdomen:        Soft. NT. ND. +BS. No masses. No guarding, rigidity, rebound  Extremities:     No pedal edema. No cyanosis. No clubbing. Pulses:            2+ and symmetric in DP. Capillary refill: normal  Lymphatic:       No cervical or supraclavicular palpable lymphadenopathy. Musculoskeletal: No joint swelling. No tenderness.    Skin:                Color, texture, turgor fair        Data:       Recent Results (from the past 24 hour(s))   GLUCOSE, POC    Collection Time: 11/03/22  4:35 PM   Result Value Ref Range    Glucose (POC) 190 (H) 70 - 110 mg/dL   STREP PNEUMO AG, URINE    Collection Time: 11/03/22  5:00 PM    Specimen: Urine, random   Result Value Ref Range    Strep pneumo Ag, urine Negative NEG     LEGIONELLA PNEUMOPHILA AG, URINE    Collection Time: 11/03/22  5:00 PM    Specimen: Urine, random   Result Value Ref Range    Legionella Ag, urine Negative NEG     GLUCOSE, POC    Collection Time: 11/03/22  5:54 PM   Result Value Ref Range    Glucose (POC) 190 (H) 70 - 110 mg/dL   MAGNESIUM    Collection Time: 11/03/22  8:34 PM   Result Value Ref Range    Magnesium 2.2 1.6 - 2.6 mg/dL   CULTURE, RESPIRATORY/SPUTUM/BRONCH W GRAM STAIN    Collection Time: 11/03/22  8:39 PM    Specimen: Sputum   Result Value Ref Range    Special Requests: NO SPECIAL REQUESTS      GRAM STAIN FEW WBCS SEEN      GRAM STAIN OCCASIONAL GRAM POSITIVE COCCI IN CLUSTERS      Culture result: PENDING    GLUCOSE, POC    Collection Time: 11/03/22 10:04 PM   Result Value Ref Range    Glucose (POC) 229 (H) 70 - 110 mg/dL   OCCULT BLOOD, STOOL    Collection Time: 11/03/22 10:08 PM   Result Value Ref Range    Occult blood, stool Positive (A) NEG     MAGNESIUM    Collection Time: 11/04/22  5:15 AM   Result Value Ref Range    Magnesium 2.0 1.6 - 2.6 mg/dL   CBC WITH AUTOMATED DIFF    Collection Time: 11/04/22  5:15 AM   Result Value Ref Range    WBC 6.7 4.6 - 13.2 K/uL    RBC 2.72 (L) 4.35 - 5.65 M/uL    HGB 8.2 (L) 13.0 - 16.0 g/dL    HCT 23.8 (L) 36.0 - 48.0 %    MCV 87.5 78.0 - 100.0 FL    MCH 30.1 24.0 - 34.0 PG    MCHC 34.5 31.0 - 37.0 g/dL    RDW 19.9 (H) 11.6 - 14.5 %    PLATELET 59 (L) 095 - 420 K/uL    NRBC 2.5 (H) 0  WBC    ABSOLUTE NRBC 0.17 (H) 0.00 - 0.01 K/uL    NEUTROPHILS 83 (H) 40 - 73 %    LYMPHOCYTES 5 (L) 21 - 52 %    MONOCYTES 9 3 - 10 %    EOSINOPHILS 0 0 - 5 %    BASOPHILS 1 0 - 2 %    IMMATURE GRANULOCYTES 2 (H) 0.0 - 0.5 %    ABS. NEUTROPHILS 5.6 1.8 - 8.0 K/UL    ABS. LYMPHOCYTES 0.3 (L) 0.9 - 3.6 K/UL    ABS. MONOCYTES 0.6 0.05 - 1.2 K/UL    ABS. EOSINOPHILS 0.0 0.0 - 0.4 K/UL    ABS. BASOPHILS 0.1 0.0 - 0.1 K/UL    ABS. IMM.  GRANS. 0.1 (H) 0.00 - 0.04 K/UL    DF AUTOMATED      PLATELET COMMENTS DECREASED PLATELETS      RBC COMMENTS NICHO CELLS  2+        RBC COMMENTS SCHISTOCYTES  1+        RBC COMMENTS Elliptocytes 1+  OVALOCYTES  1+      METABOLIC PANEL, COMPREHENSIVE    Collection Time: 11/04/22  5:15 AM   Result Value Ref Range    Sodium 144 136 - 145 mmol/L    Potassium 4.0 3.5 - 5.5 mmol/L    Chloride 121 (H) 100 - 111 mmol/L    CO2 12 (L) 21 - 32 mmol/L    Anion gap 11 3.0 - 18 mmol/L    Glucose 140 (H) 74 - 99 mg/dL    BUN 28 (H) 7.0 - 18 MG/DL    Creatinine 1.63 (H) 0.6 - 1.3 MG/DL    BUN/Creatinine ratio 17 12 - 20      eGFR 42 (L) >60 ml/min/1.73m2    Calcium 7.6 (L) 8.5 - 10.1 MG/DL Bilirubin, total 1.2 (H) 0.2 - 1.0 MG/DL    ALT (SGPT) 36 16 - 61 U/L    AST (SGOT) 59 (H) 10 - 38 U/L    Alk.  phosphatase 152 (H) 45 - 117 U/L    Protein, total 5.8 (L) 6.4 - 8.2 g/dL    Albumin 1.3 (L) 3.4 - 5.0 g/dL    Globulin 4.5 (H) 2.0 - 4.0 g/dL    A-G Ratio 0.3 (L) 0.8 - 1.7     VANCOMYCIN, RANDOM    Collection Time: 11/04/22  5:15 AM   Result Value Ref Range    Vancomycin, random 14.7 5.0 - 40.0 UG/ML   CALCIUM, IONIZED    Collection Time: 11/04/22  5:15 AM   Result Value Ref Range    Ionized Calcium 1.17 1.12 - 1.32 MMOL/L   PHOSPHORUS    Collection Time: 11/04/22  5:15 AM   Result Value Ref Range    Phosphorus 1.5 (L) 2.5 - 4.9 MG/DL   GLUCOSE, POC    Collection Time: 11/04/22  8:21 AM   Result Value Ref Range    Glucose (POC) 114 (H) 70 - 110 mg/dL   GLUCOSE, POC    Collection Time: 11/04/22 12:04 PM   Result Value Ref Range    Glucose (POC) 147 (H) 70 - 110 mg/dL   PHOSPHORUS    Collection Time: 11/04/22 12:45 PM   Result Value Ref Range    Phosphorus 2.8 2.5 - 4.9 MG/DL         Chemistry Recent Labs     11/04/22  1245 11/04/22  0515 11/03/22  2034 11/03/22  1440 11/03/22  0539 11/02/22  1200   GLU  --  140*  --   --  73* 300*   NA  --  144  --   --  146* 142   K  --  4.0  --   --  4.5 3.2*   CL  --  121*  --   --  122* 116*   CO2  --  12*  --   --  15* 13*   BUN  --  28*  --   --  32* 38*   CREA  --  1.63*  --   --  1.67* 1.79*   CA  --  7.6*  --   --  7.7* 7.5*   MG  --  2.0 2.2 1.8 1.8 1.6   PHOS 2.8 1.5*  --   --   --   --    AGAP  --  11  --   --  9 13   BUCR  --  17  --   --  19 21*   AP  --  152*  --   --  131* 163*   TP  --  5.8*  --   --  5.5* 5.4*   ALB  --  1.3*  --   --  1.3* 1.6*   GLOB  --  4.5*  --   --  4.2* 3.8   AGRAT  --  0.3*  --   --  0.3* 0.4*          Lactic Acid Lactic acid   Date Value Ref Range Status   08/27/2022 4.4 (HH) 0.4 - 2.0 MMOL/L Final     Comment:     CALLED TO AND CORRECTLY REPEATED BY:  SINDY MOORE ER ON 8/27/22 AT 1813 TO TAD       No results for input(s): LAC in the last 72 hours. Liver Enzymes Protein, total   Date Value Ref Range Status   11/04/2022 5.8 (L) 6.4 - 8.2 g/dL Final     Albumin   Date Value Ref Range Status   11/04/2022 1.3 (L) 3.4 - 5.0 g/dL Final     Globulin   Date Value Ref Range Status   11/04/2022 4.5 (H) 2.0 - 4.0 g/dL Final     A-G Ratio   Date Value Ref Range Status   11/04/2022 0.3 (L) 0.8 - 1.7   Final     Alk. phosphatase   Date Value Ref Range Status   11/04/2022 152 (H) 45 - 117 U/L Final     Recent Labs     11/04/22  0515 11/03/22  0539 11/02/22  1200   TP 5.8* 5.5* 5.4*   ALB 1.3* 1.3* 1.6*   GLOB 4.5* 4.2* 3.8   AGRAT 0.3* 0.3* 0.4*   * 131* 163*          CBC w/Diff Recent Labs     11/04/22  0515 11/03/22  1440 11/03/22  0640 11/03/22  0539 11/02/22  1200   WBC 6.7  --   --  5.0 3.7*   RBC 2.72*  --   --  2.19* 2.73*   HGB 8.2* 8.4* 6.5* 6.6* 8.3*   HCT 23.8* 24.5* 19.1* 19.8* 24.3*   PLT 59*  --   --  65* 60*   GRANS 83*  --   --  77* 58   LYMPH 5*  --   --  12* 10*   EOS 0  --   --  1 0          Cardiac Enzymes No results found for: CPK, CK, CKMMB, CKMB, RCK3, CKMBT, CKNDX, CKND1, KIMBERLY, TROPT, TROIQ, BLANCO, TROPT, TNIPOC, BNP, BNPP     BNP No results found for: BNP, BNPP, XBNPT     Coagulation No results for input(s): PTP, INR, APTT, INREXT, INREXT in the last 72 hours.       Thyroid  No results found for: T4, T3U, TSH, TSHEXT, TSHEXT    No results found for: T4     Urinalysis Lab Results   Component Value Date/Time    Color YELLOW 11/02/2022 02:40 PM    Appearance CLEAR 11/02/2022 02:40 PM    Specific gravity 1.020 11/02/2022 02:40 PM    pH (UA) 5.5 11/02/2022 02:40 PM    Protein 100 (A) 11/02/2022 02:40 PM    Glucose 250 (A) 11/02/2022 02:40 PM    Ketone Negative 11/02/2022 02:40 PM    Bilirubin Negative 11/02/2022 02:40 PM    Urobilinogen 0.2 11/02/2022 02:40 PM    Nitrites Negative 11/02/2022 02:40 PM    Leukocyte Esterase Negative 11/02/2022 02:40 PM    Epithelial cells 1+ 11/02/2022 02:40 PM    Bacteria FEW (A) 11/02/2022 02:40 PM    WBC 0 to 3 11/02/2022 02:40 PM    RBC 0 to 3 11/02/2022 02:40 PM        Micro  Recent Labs     11/03/22 2039 11/02/22 1440 11/02/22 1207   CULT PENDING No growth (<1,000 CFU/ML) NO GROWTH 2 DAYS  NO GROWTH 2 DAYS       Recent Labs     11/03/22 2039 11/02/22  1440 11/02/22  1207   CULT PENDING No growth (<1,000 CFU/ML) NO GROWTH 2 DAYS  NO GROWTH 2 DAYS            Culture data during this hospitalization.    All Micro Results       Procedure Component Value Units Date/Time    CULTURE, RESPIRATORY/SPUTUM/BRONCH Rosamaria Georgia [[id number]] Collected: 11/03/22 2039    Order Status: Completed Specimen: Sputum Updated: 11/04/22 1546     Special Requests: NO SPECIAL REQUESTS        GRAM STAIN FEW WBCS SEEN               OCCASIONAL GRAM POSITIVE COCCI IN CLUSTERS           Culture result: PENDING    CULTURE, URINE [[id number]] Collected: 11/02/22 1440    Order Status: Completed Specimen: Cath Urine Updated: 11/04/22 1116     Special Requests: NO SPECIAL REQUESTS        Culture result: No growth (<1,000 CFU/ML)       CULTURE, BLOOD [[id number]] Collected: 11/02/22 1207    Order Status: Completed Specimen: Blood Updated: 11/04/22 0722     Special Requests: NO SPECIAL REQUESTS        Culture result: NO GROWTH 2 DAYS       CULTURE, BLOOD [[id number]] Collected: 11/02/22 1207    Order Status: Completed Specimen: Blood Updated: 11/04/22 0722     Special Requests: NO SPECIAL REQUESTS        Culture result: NO GROWTH 2 DAYS       LEGIONELLA PNEUMOPHILA AG, URINE [[id number]] Collected: 11/03/22 1700    Order Status: Completed Specimen: Urine, random Updated: 11/03/22 2120     Legionella Ag, urine Negative       STREP PNEUMO AG, URINE [[id number]] Collected: 11/03/22 1700    Order Status: Completed Specimen: Urine, random Updated: 11/03/22 2120     Strep pneumo Ag, urine Negative       COVID-19 WITH INFLUENZA A/B [[id number]]  (Abnormal) Collected: 11/02/22 1346    Order Status: Completed Specimen: Nasopharyngeal Updated: 11/02/22 1448     SARS-CoV-2 by PCR Detected        Comment: Positive results are indicative of the presence of SARS-CoV-2. Clinical correlation with patient history and other diagnostic information is necessary to determine patient infection status. Positive results do not rule out bacterial infection or co-infection with other pathogens. CALLED TO AND CORRECTLY REPEATED BY:  ANJEL CALLAHAN ED 11/02/22 AT 1448 BY ANI          Influenza A by PCR Not detected        Influenza B by PCR Not detected        Comment: NOTE: Influenza A and Influenza B negative results should be considered presumptive in samples that have a positive SARS-CoV-2 result. Consider re-testing with an alternate FDA-approved test if clinically indicated. Testing was performed using gracy Alize SARS-CoV-2 and Influenza A/B nucleic acid assay. This test is a multiplex Real-Time Reverse Transcriptase Polymerase Chain Reaction (RT-PCR) based in vitro diagnostic test intended for the qualitative detection of nucleic acids from SARS-CoV-2, Influenza A, and Influenza B in nasopharyngeal for use under the FDA's Emergency Use Authorization(EAU) only. Fact sheet for Patients: Xtreme Installsdate.co.nz  Fact sheet for Healthcare Providers: BoomiUpdate.co.nz         COVID-19 RAPID TEST [[id number]]     Order Status: Sent                CT CHEST ABD PELV WO CONT    Result Date: 11/2/2022  EXAM: CT chest, abdomen, and pelvis INDICATION: Shortness of breath. COMPARISON: 8/27/2022 TECHNIQUE: Axial CT imaging of the chest, abdomen, and pelvis was performed after IV contrast administration. Multiplanar reformats were generated. One or more dose reduction techniques were used on this CT: automated exposure control, adjustment of the mAs and/or kVp according to patient size, and iterative reconstruction techniques.   The specific techniques used on this CT exam have been documented in the patient's electronic medical record. Digital Imaging and Communications in Medicine (DICOM) format image data are available to nonaffiliated external healthcare facilities or entities on a secure, media free, reciprocally searchable basis with patient authorization for at least a 12-month period after this study. _______________ FINDINGS: CHEST: MEDIASTINUM: Heart size is normal. Small pericardial effusion. Normal caliber aorta with vascular calcifications LYMPH NODES: No pathologically enlarged mediastinal or hilar lymph nodes. PLEURA: No pleural effusion or pneumothorax. LUNGS/AIRWAY: Extensive bilateral parenchymal groundglass locations with angulation. No evidence of honeycombing. OTHER: None. ** ABDOMEN/PELVIS: Lack of intravenous contrast limits evaluation of abdominal viscera. LIVER, BILIARY: Liver is unremarkable. No abnormal biliary dilation. Gallbladder is unremarkable. PANCREAS: Unremarkable. SPLEEN: Unremarkable. ADRENALS: Unremarkable. KIDNEYS: No hydronephrosis or renal calcification. Oliveira catheter within the urinary bladder. LYMPH NODES: No pathologically enlarged lymph nodes. GASTROINTESTINAL TRACT: Prior right colectomy with right ileocolic anastomosis. No abnormal bowel dilation or wall thickening. PELVIC ORGANS: Unremarkable. VASCULATURE: Vascular calcifications. OSSEOUS: Numerous sclerotic foci throughout the axial skeleton possibly representing bone islands. No area of erosion or aggressive-appearing bone destruction. OTHER: Trace ascites. _______________     Extensive groundglass opacities throughout the lungs, may reflect atypical infection or edema superimposed on or as pneumonia. No acute intra-abdominal abnormality. Trace ascites.     XR CHEST PORT    Result Date: 11/2/2022  EXAM: XR CHEST PORT CLINICAL INDICATION/HISTORY: meets SIRS criteria -Additional: None COMPARISON: 8/27/2022 TECHNIQUE: Frontal view of the chest _______________ FINDINGS: HEART AND MEDIASTINUM: Right chest wall port tip at the cavoatrial junction. Normal cardiac size and mediastinal contours. LUNGS AND PLEURAL SPACES: Bilateral interstitial and parenchymal opacities. No focal pneumothorax or pleural effusion. BONY THORAX AND SOFT TISSUES: No acute osseous abnormality _______________     Bilateral interstitial and parenchymal opacities. Images report reviewed by me:  CT (Most Recent) (CT chest reviewed by me) Results from Hospital Encounter encounter on 11/02/22    CT CHEST ABD PELV WO CONT    Narrative  EXAM: CT chest, abdomen, and pelvis    INDICATION: Shortness of breath. COMPARISON: 8/27/2022    TECHNIQUE: Axial CT imaging of the chest, abdomen, and pelvis was performed  after IV contrast administration. Multiplanar reformats were generated. One or more dose reduction techniques were used on this CT: automated exposure  control, adjustment of the mAs and/or kVp according to patient size, and  iterative reconstruction techniques. The specific techniques used on this CT  exam have been documented in the patient's electronic medical record. Digital  Imaging and Communications in Medicine (DICOM) format image data are available  to nonaffiliated external healthcare facilities or entities on a secure, media  free, reciprocally searchable basis with patient authorization for at least a  12-month period after this study. _______________    FINDINGS:    CHEST:    MEDIASTINUM: Heart size is normal. Small pericardial effusion. Normal caliber  aorta with vascular calcifications    LYMPH NODES: No pathologically enlarged mediastinal or hilar lymph nodes. PLEURA: No pleural effusion or pneumothorax. LUNGS/AIRWAY: Extensive bilateral parenchymal groundglass locations with  angulation. No evidence of honeycombing. OTHER: None. **    ABDOMEN/PELVIS:    Lack of intravenous contrast limits evaluation of abdominal viscera. LIVER, BILIARY: Liver is unremarkable. No abnormal biliary dilation. Gallbladder  is unremarkable.     PANCREAS: Unremarkable. SPLEEN: Unremarkable. ADRENALS: Unremarkable. KIDNEYS: No hydronephrosis or renal calcification. Oliveira catheter within the  urinary bladder. LYMPH NODES: No pathologically enlarged lymph nodes. GASTROINTESTINAL TRACT: Prior right colectomy with right ileocolic anastomosis. No abnormal bowel dilation or wall thickening. PELVIC ORGANS: Unremarkable. VASCULATURE: Vascular calcifications. OSSEOUS: Numerous sclerotic foci throughout the axial skeleton possibly  representing bone islands. No area of erosion or aggressive-appearing bone  destruction. OTHER: Trace ascites. _______________    Impression  Extensive groundglass opacities throughout the lungs, may reflect atypical  infection or edema superimposed on or as pneumonia. No acute intra-abdominal abnormality. Trace ascites. CXR reviewed by me:  XR (Most Recent). CXR  reviewed by me and compared with previous CXR Results from Hospital Encounter encounter on 11/02/22    XR CHEST PORT    Narrative  EXAM: XR CHEST PORT    CLINICAL INDICATION/HISTORY: COVID19 pneumonia  -Additional: None    COMPARISON: 11/2/2022    TECHNIQUE: Portable frontal view of the chest    _______________    FINDINGS:    SUPPORT DEVICES: Right chest wall port unchanged. HEART AND MEDIASTINUM: Cardiomediastinal silhouette within normal limits. LUNGS AND PLEURAL SPACES: Extensive bilateral parenchymal and interstitial  opacities, unchanged. No pneumothorax.    _______________    Impression  Extensive bilateral parenchymal and interstitial opacities, unchanged. ·Please note: Voice-recognition software may have been used to generate this report, which may have resulted in some phonetic-based errors in grammar and contents. Even though attempts were made to correct all the mistakes, some may have been missed, and remained in the body of the document.       Lori Amato MD  11/4/2022

## 2022-11-04 NOTE — PROGRESS NOTES
1930-Bedside and Verbal shift change report given to Gavi Brooke RN (oncoming nurse) by Dorinda Arceo RN (offgoing nurse). Report included the following information SBAR, Kardex, ED Summary, Intake/Output, MAR, Recent Results, Med Rec Status, and Cardiac Rhythm NSR .    2000- Shift assessment completed. Pt A&Ox4, VSS on 3LNC, lung sounds are diminished. BP within defined limits on levophed. All needs addressed, call light within reach. 2039- Sputum sample sent to lab.     2208- Occult stool sample sent to lab.    0000- Reassessment complete, no change to pt condition. 0400- Reassessment complete, no change to pt condition. 0700- Bedside and Verbal shift change report given to Dorinda Arceo RN (oncoming nurse) by Gavi Brooke RN (offgoing nurse). Report included the following information SBAR, Kardex, ED Summary, Intake/Output, MAR, Recent Results, Med Rec Status, and Cardiac Rhythm NSR .

## 2022-11-05 NOTE — PROGRESS NOTES
Problem: Airway Clearance - Ineffective  Goal: Achieve or maintain patent airway  Outcome: Progressing Towards Goal     Problem: Gas Exchange - Impaired  Goal: Absence of hypoxia  Outcome: Progressing Towards Goal  Goal: Promote optimal lung function  Outcome: Progressing Towards Goal     Problem: Breathing Pattern - Ineffective  Goal: Ability to achieve and maintain a regular respiratory rate  Outcome: Progressing Towards Goal     Problem:  Body Temperature -  Risk of, Imbalanced  Goal: Ability to maintain a body temperature within defined limits  Outcome: Progressing Towards Goal  Goal: Will regain or maintain usual level of consciousness  Outcome: Progressing Towards Goal  Goal: Complications related to the disease process, condition or treatment will be avoided or minimized  Outcome: Progressing Towards Goal     Problem: Isolation Precautions - Risk of Spread of Infection  Goal: Prevent transmission of infectious organism to others  Outcome: Progressing Towards Goal     Problem: Nutrition Deficits  Goal: Optimize nutrtional status  Outcome: Progressing Towards Goal     Problem: Risk for Fluid Volume Deficit  Goal: Maintain normal heart rhythm  Outcome: Progressing Towards Goal  Goal: Maintain absence of muscle cramping  Outcome: Progressing Towards Goal  Goal: Maintain normal serum potassium, sodium, calcium, phosphorus, and pH  Outcome: Progressing Towards Goal     Problem: Loneliness or Risk for Loneliness  Goal: Demonstrate positive use of time alone when socialization is not possible  Outcome: Progressing Towards Goal     Problem: Fatigue  Goal: Verbalize increase energy and improved vitality  Outcome: Progressing Towards Goal     Problem: Patient Education: Go to Patient Education Activity  Goal: Patient/Family Education  Outcome: Progressing Towards Goal     Problem: Risk for Spread of Infection  Goal: Prevent transmission of infectious organism to others  Description: Prevent the transmission of infectious organisms to other patients, staff members, and visitors. Outcome: Progressing Towards Goal     Problem: Patient Education:  Go to Education Activity  Goal: Patient/Family Education  Outcome: Progressing Towards Goal     Problem: Pressure Injury - Risk of  Goal: *Prevention of pressure injury  Description: Document Hussain Scale and appropriate interventions in the flowsheet. Outcome: Progressing Towards Goal  Note: Pressure Injury Interventions:       Moisture Interventions: Check for incontinence Q2 hours and as needed, Offer toileting Q_hr    Activity Interventions: Pressure redistribution bed/mattress(bed type)    Mobility Interventions: HOB 30 degrees or less, Pressure redistribution bed/mattress (bed type), PT/OT evaluation    Nutrition Interventions: Document food/fluid/supplement intake    Friction and Shear Interventions: HOB 30 degrees or less, Apply protective barrier, creams and emollients, Minimize layers                Problem: Patient Education: Go to Patient Education Activity  Goal: Patient/Family Education  Outcome: Progressing Towards Goal     Problem: Falls - Risk of  Goal: *Absence of Falls  Description: Document Luciana Fall Risk and appropriate interventions in the flowsheet. Outcome: Progressing Towards Goal  Note: Fall Risk Interventions:  Mobility Interventions: Bed/chair exit alarm, Patient to call before getting OOB         Medication Interventions: Evaluate medications/consider consulting pharmacy, Patient to call before getting OOB    Elimination Interventions: Bed/chair exit alarm, Call light in reach, Toileting schedule/hourly rounds              Problem: Patient Education: Go to Patient Education Activity  Goal: Patient/Family Education  Outcome: Progressing Towards Goal     Problem: Diabetes Self-Management  Goal: *Disease process and treatment process  Description: Define diabetes and identify own type of diabetes; list 3 options for treating diabetes.   Outcome: Progressing Towards Goal  Goal: *Incorporating nutritional management into lifestyle  Description: Describe effect of type, amount and timing of food on blood glucose; list 3 methods for planning meals. Outcome: Progressing Towards Goal  Goal: *Incorporating physical activity into lifestyle  Description: State effect of exercise on blood glucose levels. Outcome: Progressing Towards Goal  Goal: *Developing strategies to promote health/change behavior  Description: Define the ABC's of diabetes; identify appropriate screenings, schedule and personal plan for screenings. Outcome: Progressing Towards Goal  Goal: *Using medications safely  Description: State effect of diabetes medications on diabetes; name diabetes medication taking, action and side effects. Outcome: Progressing Towards Goal  Goal: *Monitoring blood glucose, interpreting and using results  Description: Identify recommended blood glucose targets  and personal targets. Outcome: Progressing Towards Goal  Goal: *Prevention, detection, treatment of acute complications  Description: List symptoms of hyper- and hypoglycemia; describe how to treat low blood sugar and actions for lowering  high blood glucose level. Outcome: Progressing Towards Goal  Goal: *Prevention, detection and treatment of chronic complications  Description: Define the natural course of diabetes and describe the relationship of blood glucose levels to long term complications of diabetes.   Outcome: Progressing Towards Goal  Goal: *Developing strategies to address psychosocial issues  Description: Describe feelings about living with diabetes; identify support needed and support network  Outcome: Progressing Towards Goal  Goal: *Insulin pump training  Outcome: Progressing Towards Goal  Goal: *Sick day guidelines  Outcome: Progressing Towards Goal  Goal: *Patient Specific Goal (EDIT GOAL, INSERT TEXT)  Outcome: Progressing Towards Goal     Problem: Patient Education: Go to Patient Education Activity  Goal: Patient/Family Education  Outcome: Progressing Towards Goal     Problem: Patient Education: Go to Patient Education Activity  Goal: Patient/Family Education  Outcome: Progressing Towards Goal     Problem: Airway Clearance - Ineffective  Goal: *Patent airway  Outcome: Progressing Towards Goal  Goal: *Absence of airway secretions  Outcome: Progressing Towards Goal  Goal: *Able to cough effectively  Outcome: Progressing Towards Goal  Goal: *PALLIATIVE CARE:  Alleviation of secretions, cough and/or nasal congestion  Outcome: Progressing Towards Goal

## 2022-11-05 NOTE — PROGRESS NOTES
Problem: Airway Clearance - Ineffective  Goal: Achieve or maintain patent airway  Outcome: Progressing Towards Goal     Problem: Gas Exchange - Impaired  Goal: Absence of hypoxia  Outcome: Progressing Towards Goal  Goal: Promote optimal lung function  Outcome: Progressing Towards Goal     Problem: Breathing Pattern - Ineffective  Goal: Ability to achieve and maintain a regular respiratory rate  Outcome: Progressing Towards Goal     Problem:  Body Temperature -  Risk of, Imbalanced  Goal: Ability to maintain a body temperature within defined limits  Outcome: Progressing Towards Goal  Goal: Will regain or maintain usual level of consciousness  Outcome: Progressing Towards Goal  Goal: Complications related to the disease process, condition or treatment will be avoided or minimized  Outcome: Progressing Towards Goal     Problem: Isolation Precautions - Risk of Spread of Infection  Goal: Prevent transmission of infectious organism to others  Outcome: Progressing Towards Goal     Problem: Nutrition Deficits  Goal: Optimize nutrtional status  Outcome: Progressing Towards Goal     Problem: Risk for Fluid Volume Deficit  Goal: Maintain normal heart rhythm  Outcome: Progressing Towards Goal  Goal: Maintain absence of muscle cramping  Outcome: Progressing Towards Goal  Goal: Maintain normal serum potassium, sodium, calcium, phosphorus, and pH  Outcome: Progressing Towards Goal     Problem: Loneliness or Risk for Loneliness  Goal: Demonstrate positive use of time alone when socialization is not possible  Outcome: Progressing Towards Goal     Problem: Fatigue  Goal: Verbalize increase energy and improved vitality  Outcome: Progressing Towards Goal     Problem: Patient Education: Go to Patient Education Activity  Goal: Patient/Family Education  Outcome: Progressing Towards Goal     Problem: Risk for Spread of Infection  Goal: Prevent transmission of infectious organism to others  Description: Prevent the transmission of infectious organisms to other patients, staff members, and visitors. Outcome: Progressing Towards Goal     Problem: Patient Education:  Go to Education Activity  Goal: Patient/Family Education  Outcome: Progressing Towards Goal     Problem: Pressure Injury - Risk of  Goal: *Prevention of pressure injury  Description: Document Hussain Scale and appropriate interventions in the flowsheet. Outcome: Progressing Towards Goal  Note: Pressure Injury Interventions:       Moisture Interventions: Minimize layers    Activity Interventions: Assess need for specialty bed    Mobility Interventions: HOB 30 degrees or less    Nutrition Interventions: Document food/fluid/supplement intake, Offer support with meals,snacks and hydration    Friction and Shear Interventions: Minimize layers                Problem: Patient Education: Go to Patient Education Activity  Goal: Patient/Family Education  Outcome: Progressing Towards Goal     Problem: Falls - Risk of  Goal: *Absence of Falls  Description: Document Luciana Fall Risk and appropriate interventions in the flowsheet. Outcome: Progressing Towards Goal  Note: Fall Risk Interventions:  Mobility Interventions: Bed/chair exit alarm         Medication Interventions: Evaluate medications/consider consulting pharmacy    Elimination Interventions: Bed/chair exit alarm, Call light in reach, Toileting schedule/hourly rounds, Toilet paper/wipes in reach              Problem: Patient Education: Go to Patient Education Activity  Goal: Patient/Family Education  Outcome: Progressing Towards Goal     Problem: Diabetes Self-Management  Goal: *Disease process and treatment process  Description: Define diabetes and identify own type of diabetes; list 3 options for treating diabetes. Outcome: Progressing Towards Goal  Goal: *Incorporating nutritional management into lifestyle  Description: Describe effect of type, amount and timing of food on blood glucose; list 3 methods for planning meals.   Outcome: Progressing Towards Goal  Goal: *Incorporating physical activity into lifestyle  Description: State effect of exercise on blood glucose levels. Outcome: Progressing Towards Goal  Goal: *Developing strategies to promote health/change behavior  Description: Define the ABC's of diabetes; identify appropriate screenings, schedule and personal plan for screenings. Outcome: Progressing Towards Goal  Goal: *Using medications safely  Description: State effect of diabetes medications on diabetes; name diabetes medication taking, action and side effects. Outcome: Progressing Towards Goal  Goal: *Monitoring blood glucose, interpreting and using results  Description: Identify recommended blood glucose targets  and personal targets. Outcome: Progressing Towards Goal  Goal: *Prevention, detection, treatment of acute complications  Description: List symptoms of hyper- and hypoglycemia; describe how to treat low blood sugar and actions for lowering  high blood glucose level. Outcome: Progressing Towards Goal  Goal: *Prevention, detection and treatment of chronic complications  Description: Define the natural course of diabetes and describe the relationship of blood glucose levels to long term complications of diabetes.   Outcome: Progressing Towards Goal  Goal: *Developing strategies to address psychosocial issues  Description: Describe feelings about living with diabetes; identify support needed and support network  Outcome: Progressing Towards Goal  Goal: *Insulin pump training  Outcome: Progressing Towards Goal  Goal: *Sick day guidelines  Outcome: Progressing Towards Goal  Goal: *Patient Specific Goal (EDIT GOAL, INSERT TEXT)  Outcome: Progressing Towards Goal     Problem: Patient Education: Go to Patient Education Activity  Goal: Patient/Family Education  Outcome: Progressing Towards Goal     Problem: Hypotension  Goal: *Blood pressure within specified parameters  Outcome: Progressing Towards Goal  Goal: *Fluid volume balance  Outcome: Progressing Towards Goal  Goal: *Labs within defined limits  Outcome: Progressing Towards Goal     Problem: Patient Education: Go to Patient Education Activity  Goal: Patient/Family Education  Outcome: Progressing Towards Goal

## 2022-11-05 NOTE — PROGRESS NOTES
Hematology / Oncology Initial Consult Note    Admit Date: 11/2/2022    Reason for Consult: Anemia/Thrombocytopenia    Requesting Physician: Dr. Juani Soto    Assessment:     Garland Cerrato is a [de-identified] y.o., BLACK/, male, who I have been asked to see for cytopenias. 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 Empiric antibiotics. Not neutropenic so will not need GCSF. Blood cultures with no growth to date. Resp culture with gram + clusters. 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 Followed at [facility] with note of cytopenias from treatment. CT CAP on 11/2/22 with good response to treatment without a large burden of disease. Normocytic anemia: due to chemotherapy. Ferritin elevated. Stable. Thrombocytopenia: likely multifactorial from prior chemo and new infection. Stable. Fibrinogen elevated    Plan:     - No plan for chemotherapy while inhouse  - GCSF not indicated as not neutropenic  - Continue empiric antibiotics and BP support per ICU  - Transfuse to maintain Hg>7  - Recommend DVT ppx with SCDs given COVID  - Will follow peripherally    Olinda Contreras MD  [facility]  Cell [phone]      Subjective:     No acute overnight events  Notes some improvement in breathing  On 3L NC, on Levophed    Past Medical History:   Diagnosis Date    Endocrine disease     Hypertension        History reviewed. No pertinent surgical history. History reviewed. No pertinent family history.     Social History     Socioeconomic History    Marital status:        Current Facility-Administered Medications   Medication Dose Route Frequency    ferrous sulfate tablet 325 mg  1 Tablet Oral DAILY WITH BREAKFAST    0.9% sodium chloride infusion 250 mL  250 mL IntraVENous PRN    insulin lispro (HUMALOG) injection   SubCUTAneous AC&HS    insulin glargine (LANTUS) injection 12 Units  0.2 Units/kg SubCUTAneous QHS    levoFLOXacin (LEVAQUIN) 750 mg in D5W IVPB  750 mg IntraVENous Q48H    dexamethasone (DECADRON) 4 mg/mL injection 6 mg  6 mg IntraVENous Q24H    sodium chloride (NS) flush 5-10 mL  5-10 mL IntraVENous PRN    NOREPINephrine (LEVOPHED) 8 mg in 5% dextrose 250mL (32 mcg/mL) infusion  0.5-16 mcg/min IntraVENous TITRATE    ELECTROLYTE REPLACEMENT PROTOCOL - Potassium Renal Dosing  1 Each Other PRN    ELECTROLYTE REPLACEMENT PROTOCOL - Magnesium   1 Each Other PRN    ELECTROLYTE REPLACEMENT PROTOCOL  - Phosphorus Renal Dosing  1 Each Other PRN    ELECTROLYTE REPLACEMENT PROTOCOL - Calcium   1 Each Other PRN    glucose chewable tablet 16 g  4 Tablet Oral PRN    glucagon (GLUCAGEN) injection 1 mg  1 mg IntraMUSCular PRN    dextrose 10% infusion 0-250 mL  0-250 mL IntraVENous PRN    pantoprazole (PROTONIX) 40 mg in 0.9% sodium chloride 10 mL injection  40 mg IntraVENous Q12H    [Held by provider] heparin (porcine) injection 5,000 Units  5,000 Units SubCUTAneous Q8H       Prior to Admission medications    Medication Sig Start Date End Date Taking? Authorizing Provider   simvastatin (ZOCOR) 20 mg tablet Take  by mouth nightly. Ibrahima Langston MD   lisinopril (PRINIVIL, ZESTRIL) 20 mg tablet Take  by mouth daily. Ibrahima Langston MD   aspirin delayed-release 81 mg tablet Take  by mouth daily. Ibrahima Langston MD   metFORMIN (GLUCOPHAGE) 1,000 mg tablet Take 1,000 mg by mouth two (2) times daily (with meals).     Ibrahima Langston MD       No Known Allergies    ECOG PS:    Physical Exam:    Temp (24hrs), Av.6 °F (36.4 °C), Min:97.5 °F (36.4 °C), Max:97.8 °F (36.6 °C)  [email or web address]      General: Alert , Oriented, in no distress  HEENT: no pallor, anicteric sclera, oral pharynx without lesion   No cervical, supraclavicular, axillary and inguinal lymphadenopathy palpated  Heart: regular rate, and rhythm, without murmur, gallop or rubbing  Lungs:breathing comfortably on room air, clear to auscultation and percussion bilaterally  ABD: bowel sound present, soft, nondistended, nontender, no hepatosplenomegaly or mass  Extremities: warm, well perfused, no edema  MSK: no tenderness along the spine or long bones  Skin: No rash  Neuro: non-focal    Imaging:     NM Perefusion scan   Negative for PE    PVL Negative for DVT

## 2022-11-05 NOTE — ROUTINE PROCESS
Bedside shift change report given to Tomás Maria RN (oncoming nurse) by Giselle Flores RN (offgoing nurse). Report included the following information SBAR, Kardex, Intake/Output, and MAR. Cain Dorado  68 y.o. female  1937  [facility] [address]  [id number]     [facility] Practice: Progress Note       Encounter Date: 4/16/2018    No chief complaint on file. History provided by patient  History of Present Illness   Cain Dorado is a 68 y.o. female who presents to clinic today for:    UTI follow up  Patient present with cc of follow up UTI. Patient was recently see for Salmonella UTI and on f/u had UCx positive for GBS which ws treated with amoxicillin. Patient reports that prior to amoxil she was not having any symptoms. However this morning, she reports discomfort when urinating. Health Maintenance  There are no preventive care reminders to display for this patient. Review of Systems   Review of Systems   Constitutional: Negative for chills and fever. Respiratory: Negative for cough. Gastrointestinal: Negative for abdominal pain, constipation, diarrhea, nausea and vomiting. Genitourinary: Positive for dysuria. Negative for flank pain, frequency, hematuria and urgency. Skin: Negative for itching and rash. Neurological: Negative for dizziness and headaches. Vitals/Objective:     Vitals:    04/16/18 0929   BP: 107/54   Pulse: 86   Resp: 20   Temp: 98.3 °F (36.8 °C)   TempSrc: Oral   SpO2: 97%   Weight: 204 lb (92.5 kg)   Height: 4' 11\" (1.499 m)     Body mass index is 41.2 kg/(m^2). Wt Readings from Last 3 Encounters:   04/16/18 204 lb (92.5 kg)   03/27/18 204 lb (92.5 kg)   03/15/18 205 lb (93 kg)       Physical Exam   Constitutional: She is oriented to person, place, and time. She appears well-developed and well-nourished. HENT:   Head: Normocephalic and atraumatic. Cardiovascular: Normal rate and regular rhythm. No murmur heard. Pulmonary/Chest: Effort normal and breath sounds normal. No respiratory distress. She has no wheezes. She has no rales. Abdominal: Soft. Bowel sounds are normal. She exhibits no distension.  There is no tenderness. There is no rebound and no guarding. Neurological: She is alert and oriented to person, place, and time. Recent Results (from the past 24 hour(s))   AMB POC URINALYSIS DIP STICK AUTO W/O MICRO    Collection Time: 04/16/18  9:41 AM   Result Value Ref Range    Color (UA POC) Yellow     Clarity (UA POC) Clear     Glucose (UA POC) Negative Negative    Bilirubin (UA POC) Negative Negative    Ketones (UA POC) Negative Negative    Specific gravity (UA POC) 1.020 1.001 - 1.035    Blood (UA POC) Trace Negative    pH (UA POC) 5.5 4.6 - 8.0    Protein (UA POC) 1+ Negative    Urobilinogen (UA POC) 0.2 mg/dL 0.2 - 1    Nitrites (UA POC) Negative Negative    Leukocyte esterase (UA POC) 3+ Negative     Assessment and Plan:     Encounter Diagnoses     ICD-10-CM ICD-9-CM   1. Acute cystitis with hematuria N30.01 595.0   2. History of recurrent UTI (urinary tract infection) Z87.440 V13.02   3. Pernicious anemia D51.0 281.0       1. Acute cystitis with hematuria  - trimethoprim-sulfamethoxazole (BACTRIM DS, SEPTRA DS) 160-800 mg per tablet; Take 1 Tab by mouth two (2) times a day for 5 days. Dispense: 10 Tab; Refill: 0  - CULTURE, URINE    2. History of recurrent UTI (urinary tract infection)  Will start ppx antibiotic after patient completes therapy,  - AMB POC URINALYSIS DIP STICK AUTO W/O MICRO  - trimethoprim (TRIMPEX) 100 mg tablet; Take 1 Tab by mouth daily. Indications: PREVENTION OF BACTERIAL URINARY TRACT INFECTION  Dispense: 30 Tab; Refill: 5    3. Pernicious anemia  - ferrous sulfate 325 mg (65 mg iron) tablet; Take 1 Tab by mouth two (2) times a day. Indications: Iron Deficiency Anemia  Dispense: 30 Tab; Refill: 5    I have discussed the diagnosis with the patient and the intended plan as seen in the above orders. she has expressed understanding. The patient has received an after-visit summary and questions were answered concerning future plans.   I have discussed medication side effects and warnings with the patient as well. Electronically Signed: Stephanie Whyte MD     History/Allergies   Patients past medical, surgical and family histories were reviewed and updated. Past Medical History:   Diagnosis Date    Anemia     Arthritis     CAD (coronary artery disease) 1997    MI    Degenerative joint disease of right hip 09/14/2015     [facility]/Dr. Anna Kwok    Diabetes ([facility] [address]     Fracture     right shoulder; T6 compression    Gastritis     Hypercholesterolemia     Hyperlipidemia    Lupus 1/9/2015    Osteoporosis, post-menopausal     vertebral fracture      Past Surgical History:   Procedure Laterality Date    CARDIAC SURG PROCEDURE UNLIST      HX CHOLECYSTECTOMY      HX CORONARY STENT PLACEMENT  02/19/1997    LAD    HX GASTRIC BYPASS  2000    HX HERNIA REPAIR  2005    HX KYPHOPLASTY      t5    HX KYPHOPLASTY      L4    HX ORTHOPAEDIC      bilateral knees    HX ORTHOPAEDIC      r shoulder    HX ORTHOPAEDIC      Laminectomy    KS INC IMPLTJ NEUROSTIMULATOR ELTRD SACRAL NERVE       Family History   Problem Relation Age of Onset    Diabetes Mother     Hypertension Mother     Heart Disease Mother     Diabetes Father     Hypertension Father     Heart Disease Father    Octavio Mano Arthritis-rheumatoid Sister     Elevated Lipids Sister    Octavio Mano Arthritis-rheumatoid Sister     Hypertension Sister     Diabetes Sister     Thyroid Disease Sister     Arthritis-rheumatoid Other      Social History     Social History    Marital status:      Spouse name: N/A    Number of children: N/A    Years of education: N/A     Occupational History    Not on file.      Social History Main Topics    Smoking status: Former Smoker     Quit date: 12/30/1989    Smokeless tobacco: Never Used    Alcohol use No    Drug use: No    Sexual activity: Not Currently     Other Topics Concern    Not on file     Social History Narrative         Allergies   Allergen Reactions    Cephalosporins Nausea and Vomiting     Severe vomiting       Disposition     Follow-up Disposition:  Return for keep current appt. .    Future Appointments  Date Time Provider Yocasta Cisneros   4/30/2018 9:30 AM Ladan Buckley MD [facility] [facility]   5/10/2018 3:40 PM Dontae Stout MD [facility] [address]   6/27/2018 1:00 PM Ladan Buckley MD [facility] [place] SCHED   8/20/2018 1:40 PM Bunnie Skiff, MD [address]   9/27/2018 2:00 PM [facility] CHAIR [address]            Current Medications after this visit     Current Outpatient Prescriptions   Medication Sig    pantoprazole (PROTONIX) 40 mg tablet TK 1 T PO D    trimethoprim-sulfamethoxazole (BACTRIM DS, SEPTRA DS) 160-800 mg per tablet Take 1 Tab by mouth two (2) times a day for 5 days.  ferrous sulfate 325 mg (65 mg iron) tablet Take 1 Tab by mouth two (2) times a day. Indications: Iron Deficiency Anemia    [START ON 4/21/2018] trimethoprim (TRIMPEX) 100 mg tablet Take 1 Tab by mouth daily. Indications: PREVENTION OF BACTERIAL URINARY TRACT INFECTION    amLODIPine (NORVASC) 10 mg tablet TAKE 1/2 TABLET BY MOUTH EVERY DAY FOR 30 DAYS    gabapentin (NEURONTIN) 600 mg tablet TAKE 1 TABLET BY MOUTH TWICE DAILY    leflunomide (ARAVA) 20 mg tablet Take 1 Tab by mouth daily for 90 days. Take half tab daily for 7 days and then one tab if tolerated    glipiZIDE SR (GLUCOTROL XL) 2.5 mg CR tablet TAKE 1 TABLET BY MOUTH TWICE DAILY    atorvastatin (LIPITOR) 10 mg tablet Take 1 Tab by mouth daily.  cyclobenzaprine (FLEXERIL) 10 mg tablet Take 10 mg by mouth nightly.  cyanocobalamin (VITAMIN B-12) 1,000 mcg/mL injection 1000 mcg SC/IM  every month    ESTRACE 0.01 % (0.1 mg/gram) vaginal cream Insert 0.1 g into vagina every Monday, Wednesday, Friday.  gabapentin (NEURONTIN) 300 mg capsule Take 1 Cap by mouth two (2) times a day.  metoprolol succinate (TOPROL-XL) 25 mg XL tablet Take 1 Tab by mouth nightly.     denosumab (PROLIA) 60 mg/mL injection 1 mL by SubCUTAneous route every 6 months.  metFORMIN (GLUCOPHAGE) 500 mg tablet TAKE 1 TABLET BY MOUTH TWICE DAILY WITH MEALS    HYDROcodone-acetaminophen (NORCO) 5-325 mg per tablet Take 1 Tab by mouth every six (6) hours as needed for Pain.  glucose blood VI test strips (ASCENSIA CONTOUR) strip Glucometer for 250.00 Pt. test blood sugar once daily (in morning)    Syringe with Needle, Safety (3CC SAFETY SYRINGE 25GX5/8\") 3 mL 25 gauge x 5/8\" syrg To be used with B12 injections    busPIRone (BUSPAR) 5 mg tablet Take 5 mg by mouth two (2) times a day.  nortriptyline (PAMELOR) 10 mg capsule TAKE 1 CAPSULE BY MOUTH EVERY NIGHT AT BEDTIME    acetaminophen (TYLENOL) 650 mg CR tablet Take 1,300 mg by mouth daily as needed for Pain.  Blood-Glucose Meter (CONTOUR METER) monitoring kit Dx: 250. Check blood sugars daily.  ZINC PO Take 50 mg by mouth daily.  cholecalciferol, vitamin d3, (VITAMIN D3) 1,000 unit tablet Take 2,000 Units by mouth daily.  calcium-vitamin D (CALCIUM 500+D) 500 mg(1,250mg) -200 unit per tablet Take 2 Tabs by mouth daily.  aspirin 81 mg Tab Take 81 mg by mouth daily.  MULTIVITAMINS (MULTIVITAMIN PO) Take 1 Tab by mouth daily.  Walker [employer] walker. Patient with spinal stenosis and stopped posture. Pain with limited ambulation. No current facility-administered medications for this visit.       Medications Discontinued During This Encounter   Medication Reason    ferrous sulfate 325 mg (65 mg iron) tablet Reorder    trimethoprim (TRIMPEX) 100 mg tablet Reorder

## 2022-11-05 NOTE — PROGRESS NOTES
Problem: Airway Clearance - Ineffective  Goal: Achieve or maintain patent airway  Outcome: Progressing Towards Goal     Problem: Gas Exchange - Impaired  Goal: Absence of hypoxia  Outcome: Progressing Towards Goal  Goal: Promote optimal lung function  Outcome: Progressing Towards Goal     Problem: Breathing Pattern - Ineffective  Goal: Ability to achieve and maintain a regular respiratory rate  Outcome: Progressing Towards Goal     Problem:  Body Temperature -  Risk of, Imbalanced  Goal: Ability to maintain a body temperature within defined limits  Outcome: Progressing Towards Goal  Goal: Will regain or maintain usual level of consciousness  Outcome: Progressing Towards Goal  Goal: Complications related to the disease process, condition or treatment will be avoided or minimized  Outcome: Progressing Towards Goal     Problem: Isolation Precautions - Risk of Spread of Infection  Goal: Prevent transmission of infectious organism to others  Outcome: Progressing Towards Goal     Problem: Nutrition Deficits  Goal: Optimize nutrtional status  Outcome: Progressing Towards Goal     Problem: Risk for Fluid Volume Deficit  Goal: Maintain normal heart rhythm  Outcome: Progressing Towards Goal  Goal: Maintain absence of muscle cramping  Outcome: Progressing Towards Goal  Goal: Maintain normal serum potassium, sodium, calcium, phosphorus, and pH  Outcome: Progressing Towards Goal     Problem: Loneliness or Risk for Loneliness  Goal: Demonstrate positive use of time alone when socialization is not possible  Outcome: Progressing Towards Goal     Problem: Fatigue  Goal: Verbalize increase energy and improved vitality  Outcome: Progressing Towards Goal     Problem: Patient Education: Go to Patient Education Activity  Goal: Patient/Family Education  Outcome: Progressing Towards Goal     Problem: Risk for Spread of Infection  Goal: Prevent transmission of infectious organism to others  Description: Prevent the transmission of infectious organisms to other patients, staff members, and visitors. Outcome: Progressing Towards Goal     Problem: Patient Education:  Go to Education Activity  Goal: Patient/Family Education  Outcome: Progressing Towards Goal     Problem: Pressure Injury - Risk of  Goal: *Prevention of pressure injury  Description: Document Hussain Scale and appropriate interventions in the flowsheet. Outcome: Progressing Towards Goal  Note: Pressure Injury Interventions:       Moisture Interventions: Absorbent underpads, Apply protective barrier, creams and emollients, Check for incontinence Q2 hours and as needed, Contain wound drainage, Internal/External urinary devices    Activity Interventions: Pressure redistribution bed/mattress(bed type)    Mobility Interventions: Pressure redistribution bed/mattress (bed type), HOB 30 degrees or less    Nutrition Interventions: Document food/fluid/supplement intake    Friction and Shear Interventions: HOB 30 degrees or less, Lift sheet, Lift team/patient mobility team                Problem: Patient Education: Go to Patient Education Activity  Goal: Patient/Family Education  Outcome: Progressing Towards Goal     Problem: Falls - Risk of  Goal: *Absence of Falls  Description: Document Luciana Fall Risk and appropriate interventions in the flowsheet.   Outcome: Progressing Towards Goal  Note: Fall Risk Interventions:  Mobility Interventions: Bed/chair exit alarm         Medication Interventions: Evaluate medications/consider consulting pharmacy    Elimination Interventions: Bed/chair exit alarm, Call light in reach, Toileting schedule/hourly rounds, Toilet paper/wipes in reach              Problem: Patient Education: Go to Patient Education Activity  Goal: Patient/Family Education  Outcome: Progressing Towards Goal     Problem: Hypotension  Goal: *Blood pressure within specified parameters  Outcome: Resolved/Met  Goal: *Fluid volume balance  Outcome: Resolved/Met  Goal: *Labs within defined limits  Outcome: Resolved/Met

## 2022-11-05 NOTE — PROGRESS NOTES
[facility]  Pulmonary, Critical Care, and Sleep Medicine    Name: Ale Loaiza MRN: [id number]   : 1940 Hospital: [facility] [place]    Date: 2022  Room: [address] [place] [address] Note                                              Consult requesting physician: Dr. Musa Pace  Reason for Consult: Acute hypoxic respiratory failure    IMPRESSION:   Acute hypoxic respiratory failure - J96.01  Septic shock - A41.9, R65.21  COVID19 pneumonia - U07.1, J12.82  Immunocompromised status - D84.9  Leukopenia - D72.819  Anemia - D64.9  Thrombocytopenia - D69.6  CLAUS on CKD - N17.9  Hypokalemia  DM - uncontrolled with hyperglycemia  Patient Active Problem List   Diagnosis Code    Hypokalemia E87.6    Hypothermia T68. 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 Initially required HFNC via salter NC  CXR and CT chest showed multilobar GGO  VQ scan 11/3/22 - no PE  Keep SPO2 >=92%. On O2 via NC at 3-5 Lpm. Wean O2 flow as tolerated for goal SPO2  HOB 30 degree elevation all the time. Aggressive pulmonary toileting. Aspiration precautions. Incentive spirometry encouraged  Self proning when stable  Protecting airway  CXR for follow up in AM 22 - improvement  Follow up XR chest on Monday    CVS: actively titrated off of vasopressor for goal SBP > 100 mm Hg or MAP > 65 mm Hg  Normal cardiac troponin   ECHO 11/3/22    Left Ventricle: Normal left ventricular systolic function with a visually estimated EF of 55 - 60%. Left ventricle is smaller than normal. Normal wall thickness. Normal wall motion. Grade I diastolic dysfunction present with normal LV EF.     Tricuspid Valve: Valve structure is normal. No regurgitation. No stenosis noted. Unable to assess RVSP due to inadequate or insignificant tricuspid regurgitation    ID: COVID19 pneumonia  On steroid - Dexamethasone 6 mg daily  Elevated Procalcitonin, LD  Lactic acid normal  Patient is out the window for Remdesivir or Paxlovid or monoclonal antibodies  Bl cx 11/2/22: NGTD  Urine culture 11/2/22: NGT final  Sputum cx 11/3/22: in process  Antibiotics: Levofloxacin; Deescalate off of Zosyn, vancomycin per ID 11/4/22  Sepsis bundle and protocol followed. Fluid resuscitation as needed. Follow cultures. Hematology/Oncology: monitor CBC  Known to have chronic anemia  Transfused PRBC x 1 on 11/3/22 for Hgb > 7 goal  Thrombocytopenia likely related to chemorx  Hematology followed    Renal: start LR at 50 mL/hr  monitor renal function, lytes, UO  S. Na+ - monitor  Nephrology evaluation as per clinical course    GI/: PPI for GI prophylaxis  Diabetic diet as tolerated    Endocrine: Monitor FSBS. SSI and Lantus  No further hypoglycemia episode    Neurology: AAO x 3, generalized weak - no sensory or motor neurological deficit in limited exam  Recent mobility issues from generalized weakness    Pain/Sedation: avoid sedative, opiate, hypnotics if possible    Skin/Wound: as per nursing care    Electrolytes: Replace electrolytes per ICU electrolyte replacement protocol. Prophylaxis: DVT Prophylaxis: SCD. GI Prophylaxis: PPI. Restraints: none    Lines/Tubes: PIV, midline  Has mediport  Oliveira: 11/2/22 (Medically necessary for strict input/output monitoring in critically ill patient, will remove it when not needed. Oliveira bundle followed). Advance Directive/Palliative Care: Consulted. Full code per GD.     Transfer to floor when ok with hospitalist  Will defer respective systems problem management to primary and other respective consultant and follow patient in ICU with primary and other medical team.  Further recommendations will be based on the patient's response to recommended treatment and results of the investigation ordered. Quality Care: PPI, DVT prophylaxis, HOB elevated, Infection control all reviewed and addressed. Care of plan d/w RN, RT, MDR, hospitalist team.  D/w patient (answered all questions to satisfaction). Providing update to daughter by calling her at listed number    High complexity decision making was performed during the evaluation of this patient at high risk for decompensation with multiple organ involvement. Total critical care time spent rendering care exclusive of procedures/family discussion/coordination of care: 36 minutes. Subjective/History of Present Illness:     Patient is a [de-identified] y.o. 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 Heri Soria, anemia, HTN, HLD, CKD 3, DM presented to [facility] ER with persistent dyspnea, cough and chronic diarrhea. Patient is poor historian hence information received from GD. Per GD he is not feeling well \"for a while\", patient last month noted to have recurrent episodes of hypotension requiring his PCP to hold off, Magnesium was low; received hydration, blood transfusion and Magnesium through oncology infusion center. He developed cough, dyspnea, fatigue abt 3 weeks ago. He was taken to [facility] and dx with COVID19 infection and pneumonia per GD. There were few family members with Rishi Grace but no direct contact with patient per GD. After ER visit he was treated with steroid, antibiotics w/o improvement and was brought to [facility] ER. In ER, patient noted to be hypotensive, hypothermic. Labs showed elevated lactic acid requiring to start fluid, antibiotics and sepsis work up. He required Levophed support and admitted to ICU.  The patient denies fever, chills, night sweats, chest pain, wheezing or hemoptysis, palpitations, syncope, orthopnea, edema or paroxysmal nocturnal dyspnea, HA, neck stiffness, photophobia, sore throat, sinus pain or discharge, dysuria, nausea, vomiting, abdominal pain, rash, adenopathy, leg swelling or calf tenderness, urethral discharge, bleeding anywhere. 11/05/22:   Patient seen at bedside in ICU room #103  Remains on O2 via nasal cannula  During activity in the bed including eating, easy to get short of breath and requires increased O2 flow up to 5 L/min  Slowly comes down to 3 to 4 L/min  Reports stable cough without any active phlegm  He denies any fever, chills, hemoptysis, wheezing, CP  Hemodynamically stable and remain off of Levophed  Patient denies any palpitations, syncope, orthopnea, edema or pnd  No evidence for bleeding anywhere  Low appetite. Low urine output  PCC was not contacted by staff on anything about patient overnight. I/O last 24 hrs: Intake/Output Summary (Last 24 hours) at 11/5/2022 1437  Last data filed at 11/5/2022 0708  Gross per 24 hour   Intake 0 ml   Output 350 ml   Net -350 ml           History taken from patient, EMR     Review of Systems:  General ROS: negative for  - fever, chills, weight loss, fatigue and malaise  Cardiovascular ROS: negative for - chest pain, edema, murmur, orthopnea, palpitations or pnd  Gastrointestinal ROS: no nausea, vomiting, abdominal pain or black or bloody stools  Genito-Urinary ROS: no dysuria, trouble voiding, or hematuria  Dermatological ROS: negative for - pruritus, rash or skin lesion changes       No Known Allergies   Past Medical History:   Diagnosis Date    Endocrine disease     Hypertension       History reviewed. No pertinent surgical history. Social History     Tobacco Use    Smoking status: Not on file    Smokeless tobacco: Not on file   Substance Use Topics    Alcohol use: Not on file      History reviewed. No pertinent family history. Prior to Admission medications    Medication Sig Start Date End Date Taking? Authorizing Provider   simvastatin (ZOCOR) 20 mg tablet Take  by mouth nightly. Ibrahima Langston MD   lisinopril (PRINIVIL, ZESTRIL) 20 mg tablet Take  by mouth daily. Ibrahima Langston MD   aspirin delayed-release 81 mg tablet Take  by mouth daily. Ibrahima Langston MD   metFORMIN (GLUCOPHAGE) 1,000 mg tablet Take 1,000 mg by mouth two (2) times daily (with meals). Ibrahima Langston MD     Current Facility-Administered Medications   Medication Dose Route Frequency    ferrous sulfate tablet 325 mg  1 Tablet Oral DAILY WITH BREAKFAST    insulin lispro (HUMALOG) injection   SubCUTAneous AC&HS    insulin glargine (LANTUS) injection 12 Units  0.2 Units/kg SubCUTAneous QHS    levoFLOXacin (LEVAQUIN) 750 mg in D5W IVPB  750 mg IntraVENous Q48H    dexamethasone (DECADRON) 4 mg/mL injection 6 mg  6 mg IntraVENous Q24H    NOREPINephrine (LEVOPHED) 8 mg in 5% dextrose 250mL (32 mcg/mL) infusion  0.5-16 mcg/min IntraVENous TITRATE    pantoprazole (PROTONIX) 40 mg in 0.9% sodium chloride 10 mL injection  40 mg IntraVENous Q12H    [Held by provider] heparin (porcine) injection 5,000 Units  5,000 Units SubCUTAneous Q8H         Objective:   Vital Signs:    Visit Vitals  /82   Pulse (!) 101   Temp 97.5 °F (36.4 °C)   Resp 26   Ht 5' 7\" (1.702 m)   Wt 61.2 kg (135 lb)   SpO2 94%   BMI 21.14 kg/m²       O2 Device: Nasal cannula   O2 Flow Rate (L/min): 5 l/min   Temp (24hrs), Av.3 °F (36.3 °C), Min:95.9 °F (35.5 °C), Max:97.8 °F (36.6 °C)       Intake/Output:   Last shift:      No intake/output data recorded. Last 3 shifts:  1901 -  0700  In: 1073.7 [P.O.:200;  I.V.:873.7]  Out: 1450 [Urine:1450]      Intake/Output Summary (Last 24 hours) at 2022 1437  Last data filed at 2022 0708  Gross per 24 hour   Intake 0 ml   Output 350 ml   Net -350 ml         Last 3 Recorded Weights in this Encounter    22 1133 22 1435 22 0910   Weight: 61.2 kg (135 lb) 62 kg (136 lb 11 oz) 61.2 kg (135 lb)       No results for input(s): PHI, PHI, POC2, PCO2I, PO2, PO2I, HCO3, HCO3I, FIO2, FIO2I in the last 72 hours.       Physical Exam: Proper isolation precaution in place and may limit exam     General: in no respiratory distress, cooperative, appears stated age, chronically ill looking, on O2 via NC  HEENT: PERRL  Neck: No abnormally enlarged lymph nodes or thyroid, supple  Chest: normal  Lungs: moderate air entry, scattered rhonchi bilaterally, no dullness to percussion, no tenderness/ rash  Heart: Regular rate and rhythm, S1S2 present, or without murmur or extra heart sounds  Abdomen: non-distended, bowel sounds normoactive, tympanic, abdomen is soft without significant tenderness, masses, organomegaly or guarding, rigidity, rebound  Extremity: no pedal BL edema, no cyanosis; peripheral pulses 2+ and symmetric, capillary refill normal BL  Neuro: alert, oriented x 3, no defects noted in limited exam.  Skin: Skin color, texture, turgor unchanged      Data:       Recent Results (from the past 24 hour(s))   GLUCOSE, POC    Collection Time: 11/04/22  5:47 PM   Result Value Ref Range    Glucose (POC) 244 (H) 70 - 110 mg/dL   GLUCOSE, POC    Collection Time: 11/04/22  5:53 PM   Result Value Ref Range    Glucose (POC) 251 (H) 70 - 110 mg/dL   GLUCOSE, POC    Collection Time: 11/04/22  8:52 PM   Result Value Ref Range    Glucose (POC) 241 (H) 70 - 110 mg/dL   MAGNESIUM    Collection Time: 11/05/22  5:35 AM   Result Value Ref Range    Magnesium 2.1 1.6 - 2.6 mg/dL   CBC WITH AUTOMATED DIFF    Collection Time: 11/05/22  5:35 AM   Result Value Ref Range    WBC 8.8 4.6 - 13.2 K/uL    RBC 2.57 (L) 4.35 - 5.65 M/uL    HGB 7.8 (L) 13.0 - 16.0 g/dL    HCT 23.0 (L) 36.0 - 48.0 %    MCV 89.5 78.0 - 100.0 FL    MCH 30.4 24.0 - 34.0 PG    MCHC 33.9 31.0 - 37.0 g/dL    RDW 20.7 (H) 11.6 - 14.5 %    PLATELET 55 (L) 263 - 420 K/uL    NRBC 3.1 (H) 0  WBC    ABSOLUTE NRBC 0.27 (H) 0.00 - 0.01 K/uL    NEUTROPHILS 86 (H) 40 - 73 %    LYMPHOCYTES 4 (L) 21 - 52 %    MONOCYTES 7 3 - 10 %    EOSINOPHILS 0 0 - 5 %    BASOPHILS 1 0 - 2 %    IMMATURE GRANULOCYTES 2 (H) 0.0 - 0.5 %    ABS. NEUTROPHILS 7.5 1.8 - 8.0 K/UL    ABS. LYMPHOCYTES 0.4 (L) 0.9 - 3.6 K/UL    ABS. MONOCYTES 0.6 0.05 - 1.2 K/UL    ABS. EOSINOPHILS 0.0 0.0 - 0.4 K/UL    ABS. BASOPHILS 0.1 0.0 - 0.1 K/UL    ABS. IMM. GRANS. 0.2 (H) 0.00 - 0.04 K/UL    DF AUTOMATED      PLATELET COMMENTS DECREASED PLATELETS      RBC COMMENTS ANISOCYTOSIS  1+        RBC COMMENTS NICHO CELLS  1+        RBC COMMENTS OVALOCYTES  1+        RBC COMMENTS ELLIPTOCYTES 1+    METABOLIC PANEL, COMPREHENSIVE    Collection Time: 11/05/22  5:35 AM   Result Value Ref Range    Sodium 146 (H) 136 - 145 mmol/L    Potassium 4.6 3.5 - 5.5 mmol/L    Chloride 124 (H) 100 - 111 mmol/L    CO2 13 (L) 21 - 32 mmol/L    Anion gap 9 3.0 - 18 mmol/L    Glucose 194 (H) 74 - 99 mg/dL    BUN 42 (H) 7.0 - 18 MG/DL    Creatinine 1.95 (H) 0.6 - 1.3 MG/DL    BUN/Creatinine ratio 22 (H) 12 - 20      eGFR 34 (L) >60 ml/min/1.73m2    Calcium 7.7 (L) 8.5 - 10.1 MG/DL    Bilirubin, total 0.9 0.2 - 1.0 MG/DL    ALT (SGPT) 32 16 - 61 U/L    AST (SGOT) 61 (H) 10 - 38 U/L    Alk.  phosphatase 171 (H) 45 - 117 U/L    Protein, total 5.8 (L) 6.4 - 8.2 g/dL    Albumin 1.3 (L) 3.4 - 5.0 g/dL    Globulin 4.5 (H) 2.0 - 4.0 g/dL    A-G Ratio 0.3 (L) 0.8 - 1.7     CALCIUM, IONIZED    Collection Time: 11/05/22  5:35 AM   Result Value Ref Range    Ionized Calcium 1.18 1.12 - 1.32 MMOL/L   PHOSPHORUS    Collection Time: 11/05/22  5:35 AM   Result Value Ref Range    Phosphorus 2.8 2.5 - 4.9 MG/DL   PROCALCITONIN    Collection Time: 11/05/22  5:35 AM   Result Value Ref Range    Procalcitonin 1.60 ng/mL   GLUCOSE, POC    Collection Time: 11/05/22  7:46 AM   Result Value Ref Range    Glucose (POC) 161 (H) 70 - 110 mg/dL   GLUCOSE, POC    Collection Time: 11/05/22 11:59 AM   Result Value Ref Range    Glucose (POC) 233 (H) 70 - 110 mg/dL         Chemistry Recent Labs     11/05/22  0535 11/04/22  1245 11/04/22  0515 11/03/22  2034 11/03/22  1440 11/03/22  0539   *  --  140*  --   --  73*   *  --  144  --   --  146*   K 4.6  --  4.0  --   --  4.5   *  --  121*  --   --  122*   CO2 13*  --  12*  --   --  15*   BUN 42*  --  28*  --   --  32*   CREA 1.95*  --  1.63*  --   --  1.67*   CA 7.7*  --  7.6*  --   --  7.7*   MG 2.1  --  2.0 2.2   < > 1.8   PHOS 2.8 2.8 1.5*  --   --   --    AGAP 9  --  11  --   --  9   BUCR 22*  --  17  --   --  19   *  --  152*  --   --  131*   TP 5.8*  --  5.8*  --   --  5.5*   ALB 1.3*  --  1.3*  --   --  1.3*   GLOB 4.5*  --  4.5*  --   --  4.2*   AGRAT 0.3*  --  0.3*  --   --  0.3*    < > = values in this interval not displayed. Lactic Acid Lactic acid   Date Value Ref Range Status   08/27/2022 4.4 (HH) 0.4 - 2.0 MMOL/L Final     Comment:     CALLED TO AND CORRECTLY REPEATED BY:  SINDY MOORE ER ON 8/27/22 AT 1813 TO TAD       No results for input(s): LAC in the last 72 hours. Liver Enzymes Protein, total   Date Value Ref Range Status   11/05/2022 5.8 (L) 6.4 - 8.2 g/dL Final     Albumin   Date Value Ref Range Status   11/05/2022 1.3 (L) 3.4 - 5.0 g/dL Final     Globulin   Date Value Ref Range Status   11/05/2022 4.5 (H) 2.0 - 4.0 g/dL Final     A-G Ratio   Date Value Ref Range Status   11/05/2022 0.3 (L) 0.8 - 1.7   Final     Alk. phosphatase   Date Value Ref Range Status   11/05/2022 171 (H) 45 - 117 U/L Final     Recent Labs     11/05/22  0535 11/04/22  0515 11/03/22  0539   TP 5.8* 5.8* 5.5*   ALB 1.3* 1.3* 1.3*   GLOB 4.5* 4.5* 4.2*   AGRAT 0.3* 0.3* 0.3*   * 152* 131*          CBC w/Diff Recent Labs     11/05/22  0535 11/04/22  0515 11/03/22  1440 11/03/22  0640 11/03/22  0539   WBC 8.8 6.7  --   --  5.0   RBC 2.57* 2.72*  --   --  2.19*   HGB 7.8* 8.2* 8.4*   < > 6.6*   HCT 23.0* 23.8* 24.5*   < > 19.8*   PLT 55* 59*  --   --  65*   GRANS 86* 83*  --   --  77*   LYMPH 4* 5*  --   --  12*   EOS 0 0  --   --  1    < > = values in this interval not displayed.           Cardiac Enzymes No results found for: CPK, CK, CKMMB, CKMB, RCK3, CKMBT, CKNDX, CKND1, KIMBERLY, TROPT, TROIQ, BLANCO, TROPT, TNIPOC, BNP, BNPP     BNP No results found for: BNP, BNPP, XBNPT     Coagulation No results for input(s): PTP, INR, APTT, INREXT, INREXT in the last 72 hours. Thyroid  No results found for: T4, T3U, TSH, TSHEXT, TSHEXT    No results found for: T4     Urinalysis Lab Results   Component Value Date/Time    Color YELLOW 11/02/2022 02:40 PM    Appearance CLEAR 11/02/2022 02:40 PM    Specific gravity 1.020 11/02/2022 02:40 PM    pH (UA) 5.5 11/02/2022 02:40 PM    Protein 100 (A) 11/02/2022 02:40 PM    Glucose 250 (A) 11/02/2022 02:40 PM    Ketone Negative 11/02/2022 02:40 PM    Bilirubin Negative 11/02/2022 02:40 PM    Urobilinogen 0.2 11/02/2022 02:40 PM    Nitrites Negative 11/02/2022 02:40 PM    Leukocyte Esterase Negative 11/02/2022 02:40 PM    Epithelial cells 1+ 11/02/2022 02:40 PM    Bacteria FEW (A) 11/02/2022 02:40 PM    WBC 0 to 3 11/02/2022 02:40 PM    RBC 0 to 3 11/02/2022 02:40 PM        Micro  Recent Labs     11/03/22 2039 11/02/22  1440   CULT LIGHT NORMAL RESPIRATORY GAEL No growth (<1,000 CFU/ML)       Recent Labs     11/03/22 2039 11/02/22  1440   CULT LIGHT NORMAL RESPIRATORY GAEL No growth (<1,000 CFU/ML)            Culture data during this hospitalization.    All Micro Results       Procedure Component Value Units Date/Time    CULTURE, RESPIRATORY/SPUTUM/BRONCH Loral Stair [[id number]] Collected: 11/03/22 2039    Order Status: Completed Specimen: Sputum Updated: 11/05/22 0920     Special Requests: NO SPECIAL REQUESTS        GRAM STAIN FEW WBCS SEEN               OCCASIONAL GRAM POSITIVE COCCI IN CLUSTERS           Culture result:       LIGHT NORMAL RESPIRATORY GAEL          CULTURE, BLOOD [[id number]] Collected: 11/02/22 1207    Order Status: Completed Specimen: Blood Updated: 11/05/22 0828     Special Requests: NO SPECIAL REQUESTS        Culture result: NO GROWTH 3 DAYS CULTURE, BLOOD [[id number]] Collected: 11/02/22 1207    Order Status: Completed Specimen: Blood Updated: 11/05/22 0828     Special Requests: NO SPECIAL REQUESTS        Culture result: NO GROWTH 3 DAYS       CULTURE, URINE [[id number]] Collected: 11/02/22 1440    Order Status: Completed Specimen: Cath Urine Updated: 11/04/22 1116     Special Requests: NO SPECIAL REQUESTS        Culture result: No growth (<1,000 CFU/ML)       LEGIONELLA PNEUMOPHILA AG, URINE [[id number]] Collected: 11/03/22 1700    Order Status: Completed Specimen: Urine, random Updated: 11/03/22 2120     Legionella Ag, urine Negative       STREP PNEUMO AG, URINE [[id number]] Collected: 11/03/22 1700    Order Status: Completed Specimen: Urine, random Updated: 11/03/22 2120     Strep pneumo Ag, urine Negative       COVID-19 WITH INFLUENZA A/B [[id number]]  (Abnormal) Collected: 11/02/22 1346    Order Status: Completed Specimen: Nasopharyngeal Updated: 11/02/22 1448     SARS-CoV-2 by PCR Detected        Comment: Positive results are indicative of the presence of SARS-CoV-2. Clinical correlation with patient history and other diagnostic information is necessary to determine patient infection status. Positive results do not rule out bacterial infection or co-infection with other pathogens. CALLED TO AND CORRECTLY REPEATED BY:  ANJEL CALLAHAN ED 11/02/22 AT 1448 BY [facility]          Influenza A by PCR Not detected        Influenza B by PCR Not detected        Comment: NOTE: Influenza A and Influenza B negative results should be considered presumptive in samples that have a positive SARS-CoV-2 result. Consider re-testing with an alternate FDA-approved test if clinically indicated. Testing was performed using gracy Alize SARS-CoV-2 and Influenza A/B nucleic acid assay.   This test is a multiplex Real-Time Reverse Transcriptase Polymerase Chain Reaction (RT-PCR) based in vitro diagnostic test intended for the qualitative detection of nucleic acids from SARS-CoV-2, Influenza A, and Influenza B in nasopharyngeal for use under the FDA's Emergency Use Authorization(EAU) only. Fact sheet for Patients: [email or web address]  Fact sheet for Healthcare Providers: [email or web address]         COVID-19 RAPID TEST [[id number]] Collected: 11/02/22 1430    Order Status: Canceled                CT CHEST ABD PELV WO CONT    Result Date: 11/2/2022  EXAM: CT chest, abdomen, and pelvis INDICATION: Shortness of breath. COMPARISON: 8/27/2022 TECHNIQUE: Axial CT imaging of the chest, abdomen, and pelvis was performed after IV contrast administration. Multiplanar reformats were generated. One or more dose reduction techniques were used on this CT: automated exposure control, adjustment of the mAs and/or kVp according to patient size, and iterative reconstruction techniques. The specific techniques used on this CT exam have been documented in the patient's electronic medical record. Digital Imaging and Communications in Medicine (DICOM) format image data are available to nonaffiliated external healthcare facilities or entities on a secure, media free, reciprocally searchable basis with patient authorization for at least a 12-month period after this study. _______________ FINDINGS: CHEST: MEDIASTINUM: Heart size is normal. Small pericardial effusion. Normal caliber aorta with vascular calcifications LYMPH NODES: No pathologically enlarged mediastinal or hilar lymph nodes. PLEURA: No pleural effusion or pneumothorax. LUNGS/AIRWAY: Extensive bilateral parenchymal groundglass locations with angulation. No evidence of honeycombing. OTHER: None. ** ABDOMEN/PELVIS: Lack of intravenous contrast limits evaluation of abdominal viscera. LIVER, BILIARY: Liver is unremarkable. No abnormal biliary dilation. Gallbladder is unremarkable. PANCREAS: Unremarkable. SPLEEN: Unremarkable. ADRENALS: Unremarkable. KIDNEYS: No hydronephrosis or renal calcification.  Oliveira catheter within the urinary bladder. LYMPH NODES: No pathologically enlarged lymph nodes. GASTROINTESTINAL TRACT: Prior right colectomy with right ileocolic anastomosis. No abnormal bowel dilation or wall thickening. PELVIC ORGANS: Unremarkable. VASCULATURE: Vascular calcifications. OSSEOUS: Numerous sclerotic foci throughout the axial skeleton possibly representing bone islands. No area of erosion or aggressive-appearing bone destruction. OTHER: Trace ascites. _______________     Extensive groundglass opacities throughout the lungs, may reflect atypical infection or edema superimposed on or as pneumonia. No acute intra-abdominal abnormality. Trace ascites. XR CHEST PORT    Result Date: 11/2/2022  EXAM: XR CHEST PORT CLINICAL INDICATION/HISTORY: meets SIRS criteria -Additional: None COMPARISON: 8/27/2022 TECHNIQUE: Frontal view of the chest _______________ FINDINGS: HEART AND MEDIASTINUM: Right chest wall port tip at the cavoatrial junction. Normal cardiac size and mediastinal contours. LUNGS AND PLEURAL SPACES: Bilateral interstitial and parenchymal opacities. No focal pneumothorax or pleural effusion. BONY THORAX AND SOFT TISSUES: No acute osseous abnormality _______________     Bilateral interstitial and parenchymal opacities. Images report reviewed by me:  CT (Most Recent) (CT chest reviewed by me) Results from Hospital Encounter encounter on 11/02/22    CT CHEST ABD PELV WO CONT    Narrative  EXAM: CT chest, abdomen, and pelvis    INDICATION: Shortness of breath. COMPARISON: 8/27/2022    TECHNIQUE: Axial CT imaging of the chest, abdomen, and pelvis was performed  after IV contrast administration. Multiplanar reformats were generated. One or more dose reduction techniques were used on this CT: automated exposure  control, adjustment of the mAs and/or kVp according to patient size, and  iterative reconstruction techniques.   The specific techniques used on this CT  exam have been documented in the patient's electronic medical record. Digital  Imaging and Communications in Medicine (DICOM) format image data are available  to nonaffiliated external healthcare facilities or entities on a secure, media  free, reciprocally searchable basis with patient authorization for at least a  12-month period after this study. _______________    FINDINGS:    CHEST:    MEDIASTINUM: Heart size is normal. Small pericardial effusion. Normal caliber  aorta with vascular calcifications    LYMPH NODES: No pathologically enlarged mediastinal or hilar lymph nodes. PLEURA: No pleural effusion or pneumothorax. LUNGS/AIRWAY: Extensive bilateral parenchymal groundglass locations with  angulation. No evidence of honeycombing. OTHER: None. **    ABDOMEN/PELVIS:    Lack of intravenous contrast limits evaluation of abdominal viscera. LIVER, BILIARY: Liver is unremarkable. No abnormal biliary dilation. Gallbladder  is unremarkable. PANCREAS: Unremarkable. SPLEEN: Unremarkable. ADRENALS: Unremarkable. KIDNEYS: No hydronephrosis or renal calcification. Oliveira catheter within the  urinary bladder. LYMPH NODES: No pathologically enlarged lymph nodes. GASTROINTESTINAL TRACT: Prior right colectomy with right ileocolic anastomosis. No abnormal bowel dilation or wall thickening. PELVIC ORGANS: Unremarkable. VASCULATURE: Vascular calcifications. OSSEOUS: Numerous sclerotic foci throughout the axial skeleton possibly  representing bone islands. No area of erosion or aggressive-appearing bone  destruction. OTHER: Trace ascites. _______________    Impression  Extensive groundglass opacities throughout the lungs, may reflect atypical  infection or edema superimposed on or as pneumonia. No acute intra-abdominal abnormality. Trace ascites. CXR reviewed by me:  XR (Most Recent).  CXR  reviewed by me and compared with previous CXR Results from Hospital Encounter encounter on 11/02/22    XR CHEST PORT    Narrative  CLINICAL HISTORY:  Shortness of breath. COMPARISONS:  Chest x-ray 11/3/2022    TECHNIQUE:  single frontal view of the chest    ------------------------------------------    FINDINGS:    Lungs:  Mild to moderate increase in bilateral pulmonary interstitial markings,  improved from prior radiograph. No evidence of dense consolidation or  appreciable pleural fluid. Mediastinum: Mediport catheter, tip at cavoatrial junction. Bones: Osteopenia. Advanced left shoulder arthropathy with superior subluxation  of the left humeral head and very likely left rotator cuff tear.      ------------------------------------------    Impression  1. Interstitial lung process, improved from 11/3/2022, with differential  including pulmonary edema and atypical infection. ·Please note: Voice-recognition software may have been used to generate this report, which may have resulted in some phonetic-based errors in grammar and contents. Even though attempts were made to correct all the mistakes, some may have been missed, and remained in the body of the document.       Lori Amato MD  11/5/2022

## 2022-11-06 NOTE — PROGRESS NOTES
Hospitalist Progress Note    Patient: Suraj Sotelo MRN: [id number]  CSN: [id number]    YOB: 1940  Age: [de-identified] y.o. Sex: male    DOA: 11/2/2022 LOS:  LOS: 4 days          Chief Complaint:    Sepsis  Patient went into respiratory distress early this morning and required intubation      Assessment/Plan      [de-identified]year old gentleman with cancer came with covid positive and recent covid known infection, as well as possible pneumonia, CLAUS, sepsis    I  Septic shock  I back on pressors g  Due to covid and pneumonia  O he is back on his pressors    Covid pneumonia  Decadron  Supportive care  isolation    Hypoxic respiratory failure  pneumonia  VQ neg for Pe  C intubation early this morning around 630  Recheck duplex      Metastatic colon 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 XXXA    Pneumonia J18.9    CLAUS (acute kidney injury) ([facility] [address] N17.9    Septic shock (HCC) A41.9, R65.21    Carcinoma of colon metastatic to bone ([facility] [address] C18.9, C79.51    COVID-19 U07.1       Subjective:  Respiratory distress failed high flow    currently intubated      Vital signs/Intake and Output:  Visit Vitals  BP (!) 97/51   Pulse (!) 119   Temp 98.1 °F (36.7 °C)   Resp (!) 33   Ht 5' 7\" (1.702 m)   Wt 61.2 kg (135 lb)   SpO2 100%   BMI 21.14 kg/m²     Current Shift:  No intake/output data recorded. Last three shifts:  11/04 1901 - 11/06 0700  In: 1128.3 [P.O.:720;  I.V.:408.3]  Out: 750 [Urine:750]    Exam:seen through glass    General: e intubated and sedated  CVS:Regular rate and rhythm, tachycardia   Lungs:Clear t  Abdomen: Soft, Nontender, No distention,Extremities: No C/C/E, pulses palpable 2+  Neuro:grossly normal , f  Psych:appropriate                Labs: Results:       Chemistry Recent Labs     11/06/22 0514 11/05/22 0535 11/04/22 0515   GLU 83 194* 140*   * 146* 144   K 5.6* 4.6 4.0   * 124* 121*   CO2 8* 13* 12*   BUN 61* 42* 28*   CREA 2.58* 1.95* 1.63*   CA 8.0* 7.7* 7.6*   AGAP 15 9 11   BUCR 24* 22* 17   * 171* 152*   TP 6.8 5.8* 5.8*   ALB 1.5* 1.3* 1.3*   GLOB 5.3* 4.5* 4.5*   AGRAT 0.3* 0.3* 0.3*        CBC w/Diff Recent Labs     11/06/22 0514 11/05/22 0535 11/04/22 0515   WBC 13.0 8.8 6.7   RBC 2.93* 2.57* 2.72*   HGB 8.5* 7.8* 8.2*   HCT 25.7* 23.0* 23.8*   PLT 49* 55* 59*   GRANS 83* 86* 83*   LYMPH 1* 4* 5*   EOS 0 0 0        Cardiac Enzymes No results for input(s): CPK, CKND1, KIMBERLY in the last 72 hours. No lab exists for component: CKRMB, TROIP   Coagulation No results for input(s): PTP, INR, APTT, INREXT, INREXT in the last 72 hours. Lipid Panel No results found for: CHOL, CHOLPOCT, CHOLX, CHLST, CHOLV, 856011, HDL, HDLP, LDL, LDLC, DLDLP, 564111, VLDLC, VLDL, TGLX, TRIGL, TRIGP, TGLPOCT, CHHD, CHHDX   BNP No results for input(s): BNPP in the last 72 hours.    Liver Enzymes Recent Labs     11/06/22 0514   TP 6.8   ALB 1.5*   *        Thyroid Studies No results found for: T4, T3U, TSH, TSHEXT, TSHEXT     Procedures/imaging: see electronic medical records for all procedures/Xrays and details which were not copied into this note but were reviewed prior to creation of Alanna Rojas MD

## 2022-11-06 NOTE — PROGRESS NOTES
Assumed care of pt from [facility]. Advanced NG tube 15cm per  repeated xray. 1628: Advanced tube 10 cm per xray results. Mouth care completed small blood out was on pt gown near chin. Pat Bowles made aware possibly NG tube related. Mouth care completed no blood noted in mouth.

## 2022-11-06 NOTE — PROGRESS NOTES
[facility]  (A Division of [address] [place] [address])    Follow-up Note      Date of Admission: 11/2/2022       Date of Note:  11/5/2022    Summary:      Marylee Schneider is a [de-identified] y.o. BLACK/ with PMH of colon/prostate cancer with bone mets undergoing chemo, CKD, episodes of cytopenia's per heme notes,  recent brief admission from Oct 23-24 at [facility] with COVID 19 Pneumonia. He was Dc'ed with oral doxy on discharge. He completed course end of Oct.     He was brought in to ED on 11.2 with weakness of 2 days, progressive SOB and weakness and found hypoxic and tachycardic. He was placed on HFNC at 15 litres, given fluid resucitation and pressor on. Placed on Vanco/Levofloxacin and Zosyn. He has diffuse GGO on his CT, which was also apparent from his Oct evaluation at [facility]. CC: \"Breathing easier\"  HPI:  Chart reviewed/pt seen  Feeling better, not hungry, but eating more  Tm<100  SaO2 running 95-99% on same 3L/m NC      Current Antimicrobials:    Prior Antimicrobials:  Levofloxacin IV (11/2-) to date   Doxycycline and Augmentin 10/23 X1 week  Pip/tzb IV (11/2-) #2  Vanco IV (11/2-) #2         Assessment: Rec / Plan:   COVID   Acute respiratory failure due to COVID Vs possible sepsis  11/3:  PCT 2.65ng/mL-   -> cont Steroid  -> cont Levofloxacin  BCX remain negative, resp culture with normal john. . Looking less like bacterial sepsis   CLAUS    Metastatic Colon CA (bone)    DMT2 fair control A1c 7.8%    Resp failure with hypoxia        Microbiology:  11/3 - Resp sent      Legionella/Spneumo Ag (-)     11/2 - UA (-)      COVID (+)      Flu (-)      BCx x2 (-)      Lines / Catheters: R chest Medport and peripheral        Patient Active Problem List   Diagnosis Code    Hypokalemia E87.6    Hypothermia T68. XXXA    Pneumonia J18.9    CLAUS (acute kidney injury) ([facility] [address] N17.9    Septic shock (HCC) A41.9, R65.21    Carcinoma of colon metastatic to bone ([address] C18.9, C79.51    COVID-19 U07.1 Current Facility-Administered Medications   Medication Dose Route Frequency    lactated Ringers infusion  50 mL/hr IntraVENous CONTINUOUS    ferrous sulfate tablet 325 mg  1 Tablet Oral DAILY WITH BREAKFAST    0.9% sodium chloride infusion 250 mL  250 mL IntraVENous PRN    insulin lispro (HUMALOG) injection   SubCUTAneous AC&HS    insulin glargine (LANTUS) injection 12 Units  0.2 Units/kg SubCUTAneous QHS    levoFLOXacin (LEVAQUIN) 750 mg in D5W IVPB  750 mg IntraVENous Q48H    dexamethasone (DECADRON) 4 mg/mL injection 6 mg  6 mg IntraVENous Q24H    sodium chloride (NS) flush 5-10 mL  5-10 mL IntraVENous PRN    ELECTROLYTE REPLACEMENT PROTOCOL - Potassium Renal Dosing  1 Each Other PRN    ELECTROLYTE REPLACEMENT PROTOCOL - Magnesium   1 Each Other PRN    ELECTROLYTE REPLACEMENT PROTOCOL  - Phosphorus Renal Dosing  1 Each Other PRN    ELECTROLYTE REPLACEMENT PROTOCOL - Calcium   1 Each Other PRN    glucose chewable tablet 16 g  4 Tablet Oral PRN    glucagon (GLUCAGEN) injection 1 mg  1 mg IntraMUSCular PRN    dextrose 10% infusion 0-250 mL  0-250 mL IntraVENous PRN    pantoprazole (PROTONIX) 40 mg in 0.9% sodium chloride 10 mL injection  40 mg IntraVENous Q12H    [Held by provider] heparin (porcine) injection 5,000 Units  5,000 Units SubCUTAneous Q8H         Review of Systems - General ROS: positive for  - fatigue and malaise  negative for - chills, fever, or night sweats  Respiratory ROS: positive for - cough and shortness of breath  negative for - hemoptysis or sputum changes  Cardiovascular ROS: positive for - dyspnea on exertion and shortness of breath  negative for - chest pain  Gastrointestinal ROS: no abdominal pain, change in bowel habits, or black or bloody stools       Objective:    Visit Vitals  BP (!) 143/87 (BP 1 Location: Left upper arm, BP Patient Position: At rest)   Pulse 82   Temp (!) 96.5 °F (35.8 °C) Comment: warming blanket turned back on   Resp 29   Ht 5' 7\" (1.702 m)   Wt 61.2 kg (135 lb)   SpO2 93%   BMI 21.14 kg/m²       Temp (24hrs), Av °F (36.1 °C), Min:95.9 °F (35.5 °C), Max:97.5 °F (36.4 °C)      GEN: Thin AAM in NAD--remains on 5L oxygen suport  HEENT: anicteric  CHEST: CTA  CVS:RRR  ABD: NT NABS  EXT: no acral ischemia         Lab results:    Chemistry  Recent Labs     22  0535 22  0515 22  0539   * 140* 73*   * 144 146*   K 4.6 4.0 4.5   * 121* 122*   CO2 13* 12* 15*   BUN 42* 28* 32*   CREA 1.95* 1.63* 1.67*   CA 7.7* 7.6* 7.7*   AGAP 9 11 9   BUCR 22* 17 19   * 152* 131*   TP 5.8* 5.8* 5.5*   ALB 1.3* 1.3* 1.3*   GLOB 4.5* 4.5* 4.2*   AGRAT 0.3* 0.3* 0.3*       CBC w/ Diff  Recent Labs     22  0535 22  0515 22  1440 22  0640 22  0539   WBC 8.8 6.7  --   --  5.0   RBC 2.57* 2.72*  --   --  2.19*   HGB 7.8* 8.2* 8.4*   < > 6.6*   HCT 23.0* 23.8* 24.5*   < > 19.8*   PLT 55* 59*  --   --  65*   GRANS 86* 83*  --   --  77*   LYMPH 4* 5*  --   --  12*   EOS 0 0  --   --  1    < > = values in this interval not displayed.        Microbiology  All Micro Results       Procedure Component Value Units Date/Time    CULTURE, RESPIRATORY/SPUTUM/BRONCH Finesse Morgan [[id number]] Collected: 22    Order Status: Completed Specimen: Sputum Updated: 22     Special Requests: NO SPECIAL REQUESTS        GRAM STAIN FEW WBCS SEEN               OCCASIONAL GRAM POSITIVE COCCI IN CLUSTERS           Culture result:       LIGHT NORMAL RESPIRATORY GAEL          CULTURE, BLOOD [[id number]] Collected: 22    Order Status: Completed Specimen: Blood Updated: 22     Special Requests: NO SPECIAL REQUESTS        Culture result: NO GROWTH 3 DAYS       CULTURE, BLOOD [[id number]] Collected: 22    Order Status: Completed Specimen: Blood Updated: 22     Special Requests: NO SPECIAL REQUESTS        Culture result: NO GROWTH 3 DAYS       CULTURE, URINE [[id number]] Collected: 22 1449 Order Status: Completed Specimen: Cath Urine Updated: 11/04/22 1116     Special Requests: NO SPECIAL REQUESTS        Culture result: No growth (<1,000 CFU/ML)       LEGIONELLA PNEUMOPHILA AG, URINE [[id number]] Collected: 11/03/22 1700    Order Status: Completed Specimen: Urine, random Updated: 11/03/22 2120     Legionella Ag, urine Negative       STREP PNEUMO AG, URINE [[id number]] Collected: 11/03/22 1700    Order Status: Completed Specimen: Urine, random Updated: 11/03/22 2120     Strep pneumo Ag, urine Negative       COVID-19 WITH INFLUENZA A/B [[id number]]  (Abnormal) Collected: 11/02/22 1346    Order Status: Completed Specimen: Nasopharyngeal Updated: 11/02/22 1448     SARS-CoV-2 by PCR Detected        Comment: Positive results are indicative of the presence of SARS-CoV-2. Clinical correlation with patient history and other diagnostic information is necessary to determine patient infection status. Positive results do not rule out bacterial infection or co-infection with other pathogens. CALLED TO AND CORRECTLY REPEATED BY:  ANJEL CALLAHAN ED 11/02/22 AT 1448 BY GENAROI          Influenza A by PCR Not detected        Influenza B by PCR Not detected        Comment: NOTE: Influenza A and Influenza B negative results should be considered presumptive in samples that have a positive SARS-CoV-2 result. Consider re-testing with an alternate FDA-approved test if clinically indicated. Testing was performed using gracy Alize SARS-CoV-2 and Influenza A/B nucleic acid assay. This test is a multiplex Real-Time Reverse Transcriptase Polymerase Chain Reaction (RT-PCR) based in vitro diagnostic test intended for the qualitative detection of nucleic acids from SARS-CoV-2, Influenza A, and Influenza B in nasopharyngeal for use under the FDA's Emergency Use Authorization(EAU) only.        Fact sheet for Patients: FindDrives.pl  Fact sheet for Healthcare Providers: FindDrives.pl         COVID-19 RAPID TEST [[id number]] Collected: 11/02/22 1430    Order Status: Canceled

## 2022-11-06 NOTE — CONSULTS
Consult Note  Consult requested by:Dr Governor Members is a [de-identified] y.o. male BLACK/ who is being seen on consult for renal failure. Chief Complaint   Patient presents with    Shortness of Breath     Admission diagnosis: Acute respiratory failure    HPI:  [de-identified] yo PMH HTN, DM, prostate and colon ca with bone mets on chemo, CKD 3 with baseline 1.7 admitted for acute respiratory failure, COVID pneumonia, sepsis now with CLAUS, acidosis, hyperkalemia. Pt is on pressors, has been intermittently hypotensive. Yesterday, BP down to 80/40s. Cr increased to 2.5 today, K 5.6, Co2 8. He has been started on IV bicarb, repeat K 5.3.  cc    CT abd/pelvis w/o contrast 11/2- Extensive groundglass opacities throughout the lungs, may reflect atypical  infection or edema superimposed on or as pneumonia. No acute intra-abdominal abnormality. Kidneys unremarkable     Trace ascites  Past Medical History:   Diagnosis Date    Endocrine disease     Hypertension      History reviewed. No pertinent surgical history.   Social History     Socioeconomic History    Marital status:      Spouse name: Not on file    Number of children: Not on file    Years of education: Not on file    Highest education level: Not on file   Occupational History    Not on file   Tobacco Use    Smoking status: Not on file    Smokeless tobacco: Not on file   Substance and Sexual Activity    Alcohol use: Not on file    Drug use: Not on file    Sexual activity: Not on file   Other Topics Concern    Not on file   Social History Narrative    Not on file     Social Determinants of Health     Financial Resource Strain: Not on file   Food Insecurity: Not on file   Transportation Needs: Not on file   Physical Activity: Not on file   Stress: Not on file   Social Connections: Not on file   Intimate Partner Violence: Not on file   Housing Stability: Not on file       Family Hx:unknown hx kidney disease  No Known Allergies    Home Medications: reviewed  Review of Systems:     Unable to obtain due to clinical status  Visit Vitals  /70   Pulse (!) 116   Temp 98.2 °F (36.8 °C)   Resp 19   Ht 5' 7\" (1.702 m)   Wt 61.2 kg (135 lb)   SpO2 95%   BMI 21.14 kg/m²           Physical Assessment:     Wt Readings from Last 3 Encounters:   11/03/22 61.2 kg (135 lb)   08/27/22 63 kg (139 lb)   12/05/17 78 kg (172 lb)     Temp Readings from Last 3 Encounters:   11/06/22 98.2 °F (36.8 °C)   08/27/22 99.5 °F (37.5 °C)   12/05/17 98 °F (36.7 °C)     BP Readings from Last 3 Encounters:   11/06/22 122/70   08/27/22 119/66   12/05/17 157/75     Pulse Readings from Last 3 Encounters:   11/06/22 (!) 116   08/27/22 85   12/05/17 90    General: sedated on vent  CVS:Regular rate and rhythm, no M/R/G, S1/S2 heard, no thrill  Lungs:Clear to auscultation bilaterally, no wheezes, rhonchi, or rales  Abdomen: Soft, Nontender, No distention, Normal Bowel sounds  Extremities: No C/C/E, pulses palpable 2+  Neuro: No focal deficits  Psych:cannot asess  Derm- no rash    K 5.3 Na 146  CO2 8 BUN/Cr 61/2.5 Ca 8.0  WBC 11.6 H/H 8.5/25.7 plt 49  UA 2+prot, tr blood        Impression:   CKD 3/CLAUS- likely ATN, nonoliguric  Acute respiratory failure  COVID pneumonia  Hyperkalemia  Met acidosis  Sepsis  Met colon and prostate ca  DM  Anemia  Thrombocytopenia      Plan:   IV bicarb  Supportive care  No urgent need for RRT  Wean pressors as tolerated  Transfuse prn, heme following     Twyla Varghese MD  W- [phone]  W-[phone]

## 2022-11-06 NOTE — PROGRESS NOTES
Hospitalist Progress Note    Patient: Sylvain Eden MRN: [id number]  CSN: [id number]    YOB: 1940  Age: [de-identified] y.o. Sex: male    DOA: 11/2/2022 LOS:  LOS: 4 days          Chief Complaint:    sepsis      Assessment/Plan      [de-identified]year old gentleman with cancer came with covid positive and recent covid known infection, as well as possible pneumonia, CLAUS, sepsis    I  Septic shock  Improving  Due to covid and pneumonia  Off of pressors but still fragile    Covid pneumonia  Decadron  Supportive care  isolation    Hypoxic respiratory failure  pneumonia  VQ neg for Pe  Currently off of high flow but desaturates with eating very fragile     Metastatic colon cancer to bone  Hx of prostate cancer  Thrombocytopenia   Severe anemia s/p transfusion  Iron deficiency    Use SCD for DVT prophylaxis    Hematology consult completed  Occult stool check     CLAUS on CKD 3     Diabetes-Started on Lantus and sliding scale insulin      Hypophosphatemia-protocol repletion     Palliative care consult- full code     ICU care       Disposition : Although patient is off of high flow discussed with attending keeping in the ICU he seems very fragile and would likely be a rapid response and deteriorate quickly ICU attending in agreement  Patient Active Problem List   Diagnosis Code    Hypokalemia E87.6    Hypothermia T68. XXXA    Pneumonia J18.9    CLAUS (acute kidney injury) ([facility] [address] N17.9    Septic shock (HCC) A41.9, R65.21    Carcinoma of colon metastatic to bone ([facility] [address] C18.9, C79.51    COVID-19 U07.1       Subjective:    I seen through ICU glass discussed with attending review of systems:    Constitutional: denies fevers, chills    Gastrointestinal: denies nausea, vomiting, diarrhea      Vital signs/Intake and Output:  Visit Vitals  BP (!) 97/51   Pulse (!) 119   Temp 98.1 °F (36.7 °C)   Resp (!) 33   Ht 5' 7\" (1.702 m)   Wt 61.2 kg (135 lb)   SpO2 100%   BMI 21.14 kg/m²     Current Shift:  No intake/output data recorded.   Last three shifts:  11/04 1901 - 11/06 0700  In: 1128.3 [P.O.:720; I.V.:408.3]  Out: 750 [Urine:750]    Exam:    General: elderly AAM,  alert, NAD, OX3 fragile using accessory muscles  CVS:Regular rate and rhythm, no M/R/G, S1/S2 heard, no thrill  Lungs:Clear to auscultation bilaterally, no wheezes, rhonchi, or rales  Abdomen: Soft, Nontender, No distention, Normal Bowel sounds  Extremities: No C/C/E, pulses palpable 2+  Neuro:grossly normal , follows commands  Psych:appropriate                Labs: Results:       Chemistry Recent Labs     11/06/22  0514 11/05/22  0535 11/04/22 0515   GLU 83 194* 140*   * 146* 144   K 5.6* 4.6 4.0   * 124* 121*   CO2 8* 13* 12*   BUN 61* 42* 28*   CREA 2.58* 1.95* 1.63*   CA 8.0* 7.7* 7.6*   AGAP 15 9 11   BUCR 24* 22* 17   * 171* 152*   TP 6.8 5.8* 5.8*   ALB 1.5* 1.3* 1.3*   GLOB 5.3* 4.5* 4.5*   AGRAT 0.3* 0.3* 0.3*        CBC w/Diff Recent Labs     11/06/22 0514 11/05/22  0535 11/04/22 0515   WBC 13.0 8.8 6.7   RBC 2.93* 2.57* 2.72*   HGB 8.5* 7.8* 8.2*   HCT 25.7* 23.0* 23.8*   PLT 49* 55* 59*   GRANS 83* 86* 83*   LYMPH 1* 4* 5*   EOS 0 0 0        Cardiac Enzymes No results for input(s): CPK, CKND1, KIMBERLY in the last 72 hours. No lab exists for component: CKRMB, TROIP   Coagulation No results for input(s): PTP, INR, APTT, INREXT, INREXT in the last 72 hours. Lipid Panel No results found for: CHOL, CHOLPOCT, CHOLX, CHLST, CHOLV, 406532, HDL, HDLP, LDL, LDLC, DLDLP, 792030, VLDLC, VLDL, TGLX, TRIGL, TRIGP, TGLPOCT, CHHD, CHHDX   BNP No results for input(s): BNPP in the last 72 hours.    Liver Enzymes Recent Labs     11/06/22  0514   TP 6.8   ALB 1.5*   *        Thyroid Studies No results found for: T4, T3U, TSH, TSHEXT, TSHEXT     Procedures/imaging: see electronic medical records for all procedures/Xrays and details which were not copied into this note but were reviewed prior to creation of Magdiel Daniel MD

## 2022-11-06 NOTE — PROGRESS NOTES
P[facility] Specialists  Pulmonary, Critical Care, and Sleep Medicine    Name: Garland Cerrato MRN: [id number]   : 1940 Hospital: [facility]    Date: 2022  Room: [address] [place] [address] Note                                              Consult requesting physician: Dr. Limmie Burkitt  Reason for Consult: Acute hypoxic respiratory failure    IMPRESSION:   Acute hypoxic respiratory failure - J96.01  Septic shock - A41.9, R65.21  COVID19 pneumonia - U07.1, J12.82  Immunocompromised status - D84.9  Lactic acidosis - E87.20  CLAUS on CKD - N17.9  Hyperkalemia - E87.5  Leukopenia - D72.819  Anemia - D64.9  Thrombocytopenia - D69.6  DM - uncontrolled with hyperglycemia  Hypoalbuminemia  Severe protein calorie malnutrition  Patient Active Problem List   Diagnosis Code    Hypokalemia E87.6    Hypothermia T68. XXXA    Pneumonia J18.9    CLAUS (acute kidney injury) (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 Initially required HFNC via salter NC  Overnight developed respiratory distress with increased work of breathing likely from compensation for metabolic acidosis requiring initially HFNC and then intubation with ventilator support AM of 22  CXR 22 AM showed some improvement in GGO and f/up CXR later 22 and post-intubation 22 stability  CT chest 22 showed multilobar GGO; COVID-19 positive  VQ scan 11/3/22 - no PE  Ventilator bundle & Sedation protocol followed. Daily sedation holiday, assessment for readiness for SBT and then re-titrate if required. Chlorhexidine mouth washes  Keep SPO2 >=92%. HOB 30 degree elevation all the time. Aggressive pulmonary toileting. Aspiration precautions.    Follow up XR chest and ABG daily    Sedation: Versed drip; PRN fentanyl    CVS: actively titrated vasopressor for goal SBP > 100 mm Hg or MAP > 65 mm Hg  Normal cardiac troponin on admission  ECHO 11/3/22    Left Ventricle: Normal left ventricular systolic function with a visually estimated EF of 55 - 60%. Left ventricle is smaller than normal. Normal wall thickness. Normal wall motion. Grade I diastolic dysfunction present with normal LV EF. Tricuspid Valve: Valve structure is normal. No regurgitation. No stenosis noted. Unable to assess RVSP due to inadequate or insignificant tricuspid regurgitation    ID: COVID19 pneumonia  On steroid - Dexamethasone 6 mg daily  Elevated Procalcitonin, LD  Lactic acid worsen - no evidence for acute abdomen  Patient is out the window for Remdesivir or Paxlovid or monoclonal antibodies  Bl cx 11/2/22: NGTD  Urine culture 11/2/22: NGT final  Sputum cx 11/3/22: in process  Antibiotics: Levofloxacin; Deescalate off of Zosyn, vancomycin per ID 11/4/22  Sepsis bundle and protocol followed. Fluid resuscitation as needed. Follow cultures. Hematology/Oncology: monitor CBC  Known to have chronic anemia - stable Hgb  Relatively stable plt  No evidence for active bleeding anywhere  Not a candidate for empirical anticoagulation; VQ scan on admission no PE  Transfused PRBC x 1 on 11/3/22 for Hgb > 7 goal  Thrombocytopenia likely related to chemorx  Hematology followed    Renal: bicarb drip at 100 mL/hr  Repeat S. K+ - correct as needed  S. Na+ - monitor  Any need for Free H2O bolus via OG tube per clinical course  Monitor renal function, lytes, UO  Nephrology evaluation requested    GI/: PPI for GI prophylaxis  Diabetic diet as tolerated    Endocrine: Monitor FSBS.  SSI and Lantus  No further hypoglycemia episode    Neurology: sedated, tries to open eyes to deep stimuli; doesn't follow commands  During hospital course - AAO x 3, generalized weak - no sensory or motor neurological deficit in prior limited exams  Recent mobility issues from generalized weakness per granddaughter    Pain/Sedation: as above    Skin/Wound: as per nursing care protocol    Electrolytes: Replace electrolytes per ICU electrolyte replacement protocol. Prophylaxis: DVT Prophylaxis: SCD. GI Prophylaxis: PPI. Restraints: Wrist soft restraints for patient interfering with medical therapy/management and patient safety. Lines/Tubes: PIV, midline  Has mediport  ET tube: 11/6/22  Oliveira: 11/2/22 (Medically necessary for strict input/output monitoring in critically ill patient, will remove it when not needed. Oliveira bundle followed). Advance Directive/Palliative Care: Consulted. Full code per GD. Discussed with granddaughter Ms. Snowden Done that prognosis guarded and patient is increased risk for prolonged hospitalization, prolonged ventilator support, cardiovascular collapse with hemodynamic instability, arrhythmia, cardiac arrest, bleeding, need for RRT, nosocomial infection, morbidity, mortality, other. Granddaughter verbalized understanding and would like to visit the patient understanding restrictions with COVID19. Advised the patient to contact ICU staff to coordinate available option    Will defer respective systems problem management to primary and other respective consultant and follow patient in ICU with primary and other medical team.  Further recommendations will be based on the patient's response to recommended treatment and results of the investigation ordered. Quality Care: PPI, DVT prophylaxis, HOB elevated, Infection control all reviewed and addressed. Care of plan d/w RN, RT, hospitalist, Nephrology  D/w patient's granddaughter at listed number (answered all questions to satisfaction). High complexity decision making was performed during the evaluation of this patient at high risk for decompensation with multiple organ involvement.     Total critical care time spent rendering care exclusive of procedures/family discussion/coordination of care: 42 minutes. Subjective/History of Present Illness:     Patient is a [de-identified] y.o. 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 Winston Mo, anemia, HTN, HLD, CKD 3, DM presented to [facility] ER with persistent dyspnea, cough and chronic diarrhea. Patient is poor historian hence information received from GD. Per GD he is not feeling well \"for a while\", patient last month noted to have recurrent episodes of hypotension requiring his PCP to hold off, Magnesium was low; received hydration, blood transfusion and Magnesium through oncology infusion center. He developed cough, dyspnea, fatigue abt 3 weeks ago. He was taken to [facility] and dx with COVID19 infection and pneumonia per GD. There were few family members with Maame Pike but no direct contact with patient per GD. After ER visit he was treated with steroid, antibiotics w/o improvement and was brought to [facility] ER. In ER, patient noted to be hypotensive, hypothermic. Labs showed elevated lactic acid requiring to start fluid, antibiotics and sepsis work up. He required Levophed support and admitted to ICU. The patient denies fever, chills, night sweats, chest pain, wheezing or hemoptysis, palpitations, syncope, orthopnea, edema or paroxysmal nocturnal dyspnea, HA, neck stiffness, photophobia, sore throat, sinus pain or discharge, dysuria, nausea, vomiting, abdominal pain, rash, adenopathy, leg swelling or calf tenderness, urethral discharge, bleeding anywhere.     11/06/22:   Patient seen at bedside in ICU room #103 with proper isolation precaution  Last night patient developed increased work of breathing requiring HFNC  Later on to getting tired from work of breathing and metabolic acidosis on ABG requiring intubation and ventilator support  Post intubation, patient developed hypotension requiring vasopressor support  Sedated on the ventilator, tries to open eyes on deep stimuli  No reported fever. Urine output remains low  Started on bicarb drip for metabolic acidosis, hyperkalemia  Hemoglobin is stable; some exchange oral secretions noted during attempted NG tube placement earlier this a.m. by staff but no gross bleeding  Remains on PPI every 12 hours  [facility] was not contacted by staff on anything about patient overnight. Mode Rate Tidal Volume Pressure FiO2 PEEP   Assist control, VC+   450 ml    100 % 5 cm H20     Peak airway pressure: 22 cm H2O    Minute ventilation: 15.7 l/min      Lung protective strategy and Pl pressure goals less than or equal to 30    ABG:   Lab Results   Component Value Date/Time    PHI 7.31 (L) 11/06/2022 05:59 AM    PCO2I 28.0 (L) 11/06/2022 05:59 AM    PO2I 87 11/06/2022 05:59 AM    HCO3I 14.3 (L) 11/06/2022 05:59 AM    FIO2I 100 11/06/2022 05:59 AM        I/O last 24 hrs: Intake/Output Summary (Last 24 hours) at 11/6/2022 0854  Last data filed at 11/6/2022 0000  Gross per 24 hour   Intake 1128.33 ml   Output 550 ml   Net 578.33 ml           Review of Systems:  Review of systems not obtained due to patient factors. No Known Allergies   Past Medical History:   Diagnosis Date    Endocrine disease     Hypertension       History reviewed. No pertinent surgical history. Social History     Tobacco Use    Smoking status: Not on file    Smokeless tobacco: Not on file   Substance Use Topics    Alcohol use: Not on file      History reviewed. No pertinent family history. Prior to Admission medications    Medication Sig Start Date End Date Taking? Authorizing Provider   simvastatin (ZOCOR) 20 mg tablet Take  by mouth nightly. Ibrahima Langston MD   lisinopril (PRINIVIL, ZESTRIL) 20 mg tablet Take  by mouth daily. Ibrahima Langston MD   aspirin delayed-release 81 mg tablet Take  by mouth daily. Ibrahima Langston MD   metFORMIN (GLUCOPHAGE) 1,000 mg tablet Take 1,000 mg by mouth two (2) times daily (with meals).     Kian MD Ibrahima     Current Facility-Administered Medications   Medication Dose Route Frequency    sodium bicarbonate (8.4%) 150 mEq in dextrose 5% 1,000 mL infusion   IntraVENous CONTINUOUS    midazolam in normal saline (VERSED) 1 mg/mL infusion  0-10 mg/hr IntraVENous TITRATE    NOREPINephrine (LEVOPHED) 8 mg in 5% dextrose 250mL (32 mcg/mL) infusion  0.5-30 mcg/min IntraVENous TITRATE    chlorhexidine (PERIDEX) 0.12 % mouthwash 10 mL  10 mL Oral Q12H    ferrous sulfate tablet 325 mg  1 Tablet Oral DAILY WITH BREAKFAST    insulin lispro (HUMALOG) injection   SubCUTAneous AC&HS    insulin glargine (LANTUS) injection 12 Units  0.2 Units/kg SubCUTAneous QHS    levoFLOXacin (LEVAQUIN) 750 mg in D5W IVPB  750 mg IntraVENous Q48H    dexamethasone (DECADRON) 4 mg/mL injection 6 mg  6 mg IntraVENous Q24H    pantoprazole (PROTONIX) 40 mg in 0.9% sodium chloride 10 mL injection  40 mg IntraVENous Q12H    [Held by provider] heparin (porcine) injection 5,000 Units  5,000 Units SubCUTAneous Q8H         Objective:   Vital Signs:    Visit Vitals  /70   Pulse (!) 113   Temp 97.9 °F (36.6 °C)   Resp (!) 34   Ht 5' 7\" (1.702 m)   Wt 61.2 kg (135 lb)   SpO2 100%   BMI 21.14 kg/m²       O2 Device: Endotracheal tube, Ventilator   O2 Flow Rate (L/min): 50 l/min   Temp (24hrs), Av.6 °F (36.4 °C), Min:95.6 °F (35.3 °C), Max:98.1 °F (36.7 °C)       Intake/Output:   Last shift:      No intake/output data recorded. Last 3 shifts:  1901 -  0700  In: 1128.3 [P.O.:720;  I.V.:408.3]  Out: 750 [Urine:750]      Intake/Output Summary (Last 24 hours) at 2022 0854  Last data filed at 2022 0000  Gross per 24 hour   Intake 1128.33 ml   Output 550 ml   Net 578.33 ml         Last 3 Recorded Weights in this Encounter    22 1133 22 1435 22 0910   Weight: 61.2 kg (135 lb) 62 kg (136 lb 11 oz) 61.2 kg (135 lb)       Recent Labs     22  0559 22  0543   PHI 7.31* 7.19*   PCO2I 28.0* 15.0*   PO2I 87 83 HCO3I 14.3* 5.7*   FIO2I 100 100         Physical Exam: Proper isolation precaution in place and may limit exam     General: in no respiratory distress, sedated, cachectic, chronically ill looking, on ventilator  HEENT: PERRL, ET tube in place with any bleeding  Neck: No abnormally enlarged lymph nodes or thyroid, supple  Chest: normal  Lungs: moderate air entry, scattered stable rhonchi bilaterally, normal percussion bilaterally, no tenderness/ rash  Heart: Regular rate and rhythm, S1S2 present, or without murmur or extra heart sounds  Abdomen: non-distended, bowel sounds normoactive, tympanic, abdomen is soft without significant tenderness, masses, organomegaly or guarding, rigidity, rebound  Extremity: BL ankle and trace-1+ UE BL edema, no cyanosis; peripheral pulses 2+ and symmetric, capillary refill normal BL  Neuro: sedated, tries to open eyes on painful stimuli, doesn't follow commands, no involuntary movements, exam limitation on ventilator  Skin: Skin color, texture, turgor fair        Data:       Recent Results (from the past 24 hour(s))   GLUCOSE, POC    Collection Time: 11/05/22 11:59 AM   Result Value Ref Range    Glucose (POC) 233 (H) 70 - 110 mg/dL   GLUCOSE, POC    Collection Time: 11/05/22  3:53 PM   Result Value Ref Range    Glucose (POC) 256 (H) 70 - 110 mg/dL   GLUCOSE, POC    Collection Time: 11/05/22  9:53 PM   Result Value Ref Range    Glucose (POC) 185 (H) 70 - 110 mg/dL   MAGNESIUM    Collection Time: 11/06/22  5:14 AM   Result Value Ref Range    Magnesium 2.4 1.6 - 2.6 mg/dL   CBC WITH AUTOMATED DIFF    Collection Time: 11/06/22  5:14 AM   Result Value Ref Range    WBC 13.0 4.6 - 13.2 K/uL    RBC 2.93 (L) 4.35 - 5.65 M/uL    HGB 8.5 (L) 13.0 - 16.0 g/dL    HCT 25.7 (L) 36.0 - 48.0 %    MCV 87.7 78.0 - 100.0 FL    MCH 29.0 24.0 - 34.0 PG    MCHC 33.1 31.0 - 37.0 g/dL    RDW 26.1 (H) 11.6 - 14.5 %    PLATELET 49 (L) 494 - 420 K/uL    NRBC 11.6 (H) 0  WBC    ABSOLUTE NRBC 1.50 (H) 0.00 - 0.01 K/uL    NEUTROPHILS 83 (H) 40 - 73 %    LYMPHOCYTES 1 (L) 21 - 52 %    MONOCYTES 11 (H) 3 - 10 %    EOSINOPHILS 0 0 - 5 %    BASOPHILS 1 0 - 2 %    IMMATURE GRANULOCYTES 4 (H) 0.0 - 0.5 %    ABS. NEUTROPHILS 10.9 (H) 1.8 - 8.0 K/UL    ABS. LYMPHOCYTES 0.1 (L) 0.9 - 3.6 K/UL    ABS. MONOCYTES 1.4 (H) 0.05 - 1.2 K/UL    ABS. EOSINOPHILS 0.0 0.0 - 0.4 K/UL    ABS. BASOPHILS 0.1 0.0 - 0.1 K/UL    ABS. IMM. GRANS. 0.5 (H) 0.00 - 0.04 K/UL    DF AUTOMATED      PLATELET COMMENTS DECREASED PLATELETS      RBC COMMENTS ANISOCYTOSIS  2+  SCHISTOCYTES  2+        RBC COMMENTS SPHEROCYTES  1+  NICHO CELLS  2+        RBC COMMENTS ACANTHOCYTES  2+  OVALOCYTES  2+       METABOLIC PANEL, COMPREHENSIVE    Collection Time: 11/06/22  5:14 AM   Result Value Ref Range    Sodium 146 (H) 136 - 145 mmol/L    Potassium 5.6 (H) 3.5 - 5.5 mmol/L    Chloride 123 (H) 100 - 111 mmol/L    CO2 8 (LL) 21 - 32 mmol/L    Anion gap 15 3.0 - 18 mmol/L    Glucose 83 74 - 99 mg/dL    BUN 61 (H) 7.0 - 18 MG/DL    Creatinine 2.58 (H) 0.6 - 1.3 MG/DL    BUN/Creatinine ratio 24 (H) 12 - 20      eGFR 24 (L) >60 ml/min/1.73m2    Calcium 8.0 (L) 8.5 - 10.1 MG/DL    Bilirubin, total 2.5 (H) 0.2 - 1.0 MG/DL    ALT (SGPT) 46 16 - 61 U/L    AST (SGOT) 151 (H) 10 - 38 U/L    Alk.  phosphatase 250 (H) 45 - 117 U/L    Protein, total 6.8 6.4 - 8.2 g/dL    Albumin 1.5 (L) 3.4 - 5.0 g/dL    Globulin 5.3 (H) 2.0 - 4.0 g/dL    A-G Ratio 0.3 (L) 0.8 - 1.7     PHOSPHORUS    Collection Time: 11/06/22  5:14 AM   Result Value Ref Range    Phosphorus 5.4 (H) 2.5 - 4.9 MG/DL   LACTIC ACID    Collection Time: 11/06/22  5:15 AM   Result Value Ref Range    Lactic acid 6.4 (HH) 0.4 - 2.0 MMOL/L   BLOOD GAS, ARTERIAL POC    Collection Time: 11/06/22  5:43 AM   Result Value Ref Range    Device: Non rebreather      FIO2 (POC) 100 %    pH (POC) 7.19 (LL) 7.35 - 7.45      pCO2 (POC) 15.0 (L) 35.0 - 45.0 MMHG    pO2 (POC) 83 80 - 100 MMHG    HCO3 (POC) 5.7 (L) 22 - 26 MMOL/L    sO2 (POC) 93.9 92 - 97 %    Base deficit (POC) 20.5 mmol/L    Set Rate 38 bpm    Allens test (POC) Positive      Site LEFT RADIAL      Patient temp. 98.1      Specimen type (POC) ARTERIAL      Performed by Pérez Lopez    BLOOD GAS, ARTERIAL POC    Collection Time: 11/06/22  5:59 AM   Result Value Ref Range    Device: ADULT VENT      FIO2 (POC) 100 %    pH (POC) 7.31 (L) 7.35 - 7.45      pCO2 (POC) 28.0 (L) 35.0 - 45.0 MMHG    pO2 (POC) 87 80 - 100 MMHG    HCO3 (POC) 14.3 (L) 22 - 26 MMOL/L    sO2 (POC) 96.2 92 - 97 %    Base deficit (POC) 11.1 mmol/L    Mode ASSIST CONTROL      Tidal volume 450 ml    Set Rate 24 bpm    PEEP/CPAP (POC) 5 cmH2O    PIP (POC) 20      Allens test (POC) Positive      Site LEFT RADIAL      Patient temp. 97.4      Specimen type (POC) ARTERIAL      Performed by Pérez Lopez    GLUCOSE, POC    Collection Time: 11/06/22  6:01 AM   Result Value Ref Range    Glucose (POC) 97 70 - 110 mg/dL   CALCIUM, IONIZED    Collection Time: 11/06/22  6:16 AM   Result Value Ref Range    Ionized Calcium 1.08 (L) 1.12 - 1.32 MMOL/L         Chemistry Recent Labs     11/06/22  0514 11/05/22  0535 11/04/22  1245 11/04/22  0515   GLU 83 194*  --  140*   * 146*  --  144   K 5.6* 4.6  --  4.0   * 124*  --  121*   CO2 8* 13*  --  12*   BUN 61* 42*  --  28*   CREA 2.58* 1.95*  --  1.63*   CA 8.0* 7.7*  --  7.6*   MG 2.4 2.1  --  2.0   PHOS 5.4* 2.8 2.8 1.5*   AGAP 15 9  --  11   BUCR 24* 22*  --  17   * 171*  --  152*   TP 6.8 5.8*  --  5.8*   ALB 1.5* 1.3*  --  1.3*   GLOB 5.3* 4.5*  --  4.5*   AGRAT 0.3* 0.3*  --  0.3*          Lactic Acid Lactic acid   Date Value Ref Range Status   11/06/2022 6.4 (HH) 0.4 - 2.0 MMOL/L Final     Comment:     CALLED TO AND CORRECTLY REPEATED BY:  RAMANA GUIDRY RN ICU ON 11/6/22 AT 0555 TO TMB.        Recent Labs     11/06/22  0515   LAC 6.4*        Liver Enzymes Protein, total   Date Value Ref Range Status   11/06/2022 6.8 6.4 - 8.2 g/dL Final     Albumin   Date Value Ref Range Status   11/06/2022 1.5 (L) 3.4 - 5.0 g/dL Final     Globulin   Date Value Ref Range Status   11/06/2022 5.3 (H) 2.0 - 4.0 g/dL Final     A-G Ratio   Date Value Ref Range Status   11/06/2022 0.3 (L) 0.8 - 1.7   Final     Alk. phosphatase   Date Value Ref Range Status   11/06/2022 250 (H) 45 - 117 U/L Final     Recent Labs     11/06/22  0514 11/05/22  0535 11/04/22  0515   TP 6.8 5.8* 5.8*   ALB 1.5* 1.3* 1.3*   GLOB 5.3* 4.5* 4.5*   AGRAT 0.3* 0.3* 0.3*   * 171* 152*          CBC w/Diff Recent Labs     11/06/22  0514 11/05/22  0535 11/04/22  0515   WBC 13.0 8.8 6.7   RBC 2.93* 2.57* 2.72*   HGB 8.5* 7.8* 8.2*   HCT 25.7* 23.0* 23.8*   PLT 49* 55* 59*   GRANS 83* 86* 83*   LYMPH 1* 4* 5*   EOS 0 0 0          Cardiac Enzymes No results found for: CPK, CK, CKMMB, CKMB, RCK3, CKMBT, CKNDX, CKND1, KIMBERLY, TROPT, TROIQ, BLANCO, TROPT, TNIPOC, BNP, BNPP     BNP No results found for: BNP, BNPP, XBNPT     Coagulation No results for input(s): PTP, INR, APTT, INREXT, INREXT in the last 72 hours.       Thyroid  No results found for: T4, T3U, TSH, TSHEXT, TSHEXT    No results found for: T4     Urinalysis Lab Results   Component Value Date/Time    Color YELLOW 11/02/2022 02:40 PM    Appearance CLEAR 11/02/2022 02:40 PM    Specific gravity 1.020 11/02/2022 02:40 PM    pH (UA) 5.5 11/02/2022 02:40 PM    Protein 100 (A) 11/02/2022 02:40 PM    Glucose 250 (A) 11/02/2022 02:40 PM    Ketone Negative 11/02/2022 02:40 PM    Bilirubin Negative 11/02/2022 02:40 PM    Urobilinogen 0.2 11/02/2022 02:40 PM    Nitrites Negative 11/02/2022 02:40 PM    Leukocyte Esterase Negative 11/02/2022 02:40 PM    Epithelial cells 1+ 11/02/2022 02:40 PM    Bacteria FEW (A) 11/02/2022 02:40 PM    WBC 0 to 3 11/02/2022 02:40 PM    RBC 0 to 3 11/02/2022 02:40 PM        Micro  Recent Labs     11/03/22 2039   CULT LIGHT NORMAL RESPIRATORY GAEL       Recent Labs     11/03/22 2039   CULT LIGHT NORMAL RESPIRATORY GAEL            Culture data during this hospitalization. All Micro Results       Procedure Component Value Units Date/Time    CULTURE, BLOOD [[id number]] Collected: 11/02/22 1107    Order Status: Completed Specimen: Blood Updated: 11/06/22 0742     Special Requests: NO SPECIAL REQUESTS        Culture result: NO GROWTH 4 DAYS       CULTURE, BLOOD [[id number]] Collected: 11/02/22 1107    Order Status: Completed Specimen: Blood Updated: 11/06/22 0742     Special Requests: NO SPECIAL REQUESTS        Culture result: NO GROWTH 4 DAYS       CULTURE, RESPIRATORY/SPUTUM/BRONCH Warren Banter STAIN [[id number]] Collected: 11/03/22 2039    Order Status: Completed Specimen: Sputum Updated: 11/05/22 0920     Special Requests: NO SPECIAL REQUESTS        GRAM STAIN FEW WBCS SEEN               OCCASIONAL GRAM POSITIVE COCCI IN CLUSTERS           Culture result:       LIGHT NORMAL RESPIRATORY GAEL          CULTURE, URINE [[id number]] Collected: 11/02/22 1440    Order Status: Completed Specimen: Cath Urine Updated: 11/04/22 1116     Special Requests: NO SPECIAL REQUESTS        Culture result: No growth (<1,000 CFU/ML)       LEGIONELLA PNEUMOPHILA AG, URINE [[id number]] Collected: 11/03/22 1700    Order Status: Completed Specimen: Urine, random Updated: 11/03/22 2120     Legionella Ag, urine Negative       STREP PNEUMO AG, URINE [[id number]] Collected: 11/03/22 1700    Order Status: Completed Specimen: Urine, random Updated: 11/03/22 2120     Strep pneumo Ag, urine Negative       COVID-19 WITH INFLUENZA A/B [[id number]]  (Abnormal) Collected: 11/02/22 1346    Order Status: Completed Specimen: Nasopharyngeal Updated: 11/02/22 1448     SARS-CoV-2 by PCR Detected        Comment: Positive results are indicative of the presence of SARS-CoV-2. Clinical correlation with patient history and other diagnostic information is necessary to determine patient infection status. Positive results do not rule out bacterial infection or co-infection with other pathogens.   CALLED TO AND CORRECTLY REPEATED BY:  ANJEL CALLAHAN ED 11/02/22 AT 1448 BY FOREST          Influenza A by PCR Not detected        Influenza B by PCR Not detected        Comment: NOTE: Influenza A and Influenza B negative results should be considered presumptive in samples that have a positive SARS-CoV-2 result. Consider re-testing with an alternate FDA-approved test if clinically indicated. Testing was performed using gracy [employer]ize SARS-CoV-2 and Influenza A/B nucleic acid assay. This test is a multiplex Real-Time Reverse Transcriptase Polymerase Chain Reaction (RT-PCR) based in vitro diagnostic test intended for the qualitative detection of nucleic acids from SARS-CoV-2, Influenza A, and Influenza B in nasopharyngeal for use under the FDA's Emergency Use Authorization(EAU) only. Fact sheet for Patients: FindDrives.pl  Fact sheet for Healthcare Providers: FindD[employer]pl         COVID-19 RAPID TEST [[id number]] Collected: 11/02/22 1430    Order Status: Canceled              NM LUNG SCAN PERF    Result Date: 11/3/2022  EXAM: NUCLEAR MEDICINE LUNG PERFUSION SCAN CLINICAL INDICATION/HISTORY: PE. -Additional:  Shortness of breath. COMPARISON: Correlation is made with radiographs from the same day. TECHNIQUE: 6.0 mCi Tc-99m MAA was injected intravenously anterior, posterior, and bilateral oblique planar images were acquired. _______________ FINDINGS: No significant wedge-shaped or segmental appearing perfusion defects identified to suggest the presence of pulmonary embolism. _______________     No suspicious perfusion defects, PE absent category. CT CHEST ABD PELV WO CONT    Result Date: 11/2/2022  EXAM: CT chest, abdomen, and pelvis INDICATION: Shortness of breath. COMPARISON: 8/27/2022 TECHNIQUE: Axial CT imaging of the chest, abdomen, and pelvis was performed after IV contrast administration. Multiplanar reformats were generated.  One or more dose reduction techniques were used on this CT: automated exposure control, adjustment of the mAs and/or kVp according to patient size, and iterative reconstruction techniques. The specific techniques used on this CT exam have been documented in the patient's electronic medical record. Digital Imaging and Communications in Medicine (DICOM) format image data are available to nonaffiliated external healthcare facilities or entities on a secure, media free, reciprocally searchable basis with patient authorization for at least a 12-month period after this study. _______________ FINDINGS: CHEST: MEDIASTINUM: Heart size is normal. Small pericardial effusion. Normal caliber aorta with vascular calcifications LYMPH NODES: No pathologically enlarged mediastinal or hilar lymph nodes. PLEURA: No pleural effusion or pneumothorax. LUNGS/AIRWAY: Extensive bilateral parenchymal groundglass locations with angulation. No evidence of honeycombing. OTHER: None. ** ABDOMEN/PELVIS: Lack of intravenous contrast limits evaluation of abdominal viscera. LIVER, BILIARY: Liver is unremarkable. No abnormal biliary dilation. Gallbladder is unremarkable. PANCREAS: Unremarkable. SPLEEN: Unremarkable. ADRENALS: Unremarkable. KIDNEYS: No hydronephrosis or renal calcification. Oliveira catheter within the urinary bladder. LYMPH NODES: No pathologically enlarged lymph nodes. GASTROINTESTINAL TRACT: Prior right colectomy with right ileocolic anastomosis. No abnormal bowel dilation or wall thickening. PELVIC ORGANS: Unremarkable. VASCULATURE: Vascular calcifications. OSSEOUS: Numerous sclerotic foci throughout the axial skeleton possibly representing bone islands. No area of erosion or aggressive-appearing bone destruction. OTHER: Trace ascites. _______________     Extensive groundglass opacities throughout the lungs, may reflect atypical infection or edema superimposed on or as pneumonia. No acute intra-abdominal abnormality. Trace ascites.     XR CHEST PORT    Result Date: 11/6/2022  CLINICAL HISTORY:  Endotracheal tube placement. COMPARISONS:  Chest x-ray 11/6/2022 TECHNIQUE:  single frontal view of the chest ------------------------------------------ FINDINGS: Lungs: Moderate bilateral interstitial and alveolar lung process, not appreciably changed. No significant pleural fluid. Mediastinum: Endotracheal tube tip approximately 2.5 cm above the subha. Mediport catheter, tip likely slightly entering right atrium. . Bones: No evidence of fracture or suspicious bone lesion. ------------------------------------------    Moderate interstitial and alveolar lung process, not appreciably changed. Differential includes pulmonary edema and atypical infection. XR CHEST PORT    Result Date: 11/6/2022  --------------------------------------------------------------------------- <<<<<<<<<           [address] [id number] [facility]           >>>>>>>>> --------------------------------------------------------------------------- CLINICAL HISTORY:  Shortness of breath. COMPARISON EXAMINATIONS:  November 5, 2022. ---  SINGLE FRONTAL VIEW OF THE CHEST  --- A Mediport is again noted in place. The cardiomediastinal silhouette is stable. Diffuse bilateral increased markings and infiltrates are again seen. There are no significant pleural effusions. No significant osseous abnormalities are identified.  --------------    -------------- Diffuse increased markings and infiltrates were present previously and are similar to prior. No new consolidation or evidence for significant pleural effusion. XR CHEST PORT    Result Date: 11/5/2022  CLINICAL HISTORY:  Shortness of breath. COMPARISONS:  Chest x-ray 11/3/2022 TECHNIQUE:  single frontal view of the chest ------------------------------------------ FINDINGS: Lungs:  Mild to moderate increase in bilateral pulmonary interstitial markings, improved from prior radiograph. No evidence of dense consolidation or appreciable pleural fluid.  Mediastinum: Mediport catheter, tip at cavoatrial junction. Bones: Osteopenia. Advanced left shoulder arthropathy with superior subluxation of the left humeral head and very likely left rotator cuff tear. ------------------------------------------    1. Interstitial lung process, improved from 11/3/2022, with differential including pulmonary edema and atypical infection. XR CHEST PORT    Result Date: 11/3/2022  EXAM: XR CHEST PORT CLINICAL INDICATION/HISTORY: COVID19 pneumonia -Additional: None COMPARISON: 11/2/2022 TECHNIQUE: Portable frontal view of the chest _______________ FINDINGS: SUPPORT DEVICES: Right chest wall port unchanged. HEART AND MEDIASTINUM: Cardiomediastinal silhouette within normal limits. LUNGS AND PLEURAL SPACES: Extensive bilateral parenchymal and interstitial opacities, unchanged. No pneumothorax. _______________     Extensive bilateral parenchymal and interstitial opacities, unchanged. XR CHEST PORT    Result Date: 11/2/2022  EXAM: XR CHEST PORT CLINICAL INDICATION/HISTORY: meets SIRS criteria -Additional: None COMPARISON: 8/27/2022 TECHNIQUE: Frontal view of the chest _______________ FINDINGS: HEART AND MEDIASTINUM: Right chest wall port tip at the cavoatrial junction. Normal cardiac size and mediastinal contours. LUNGS AND PLEURAL SPACES: Bilateral interstitial and parenchymal opacities. No focal pneumothorax or pleural effusion. BONY THORAX AND SOFT TISSUES: No acute osseous abnormality _______________     Bilateral interstitial and parenchymal opacities. ECHO ADULT COMPLETE    Result Date: 11/3/2022    Left Ventricle: Normal left ventricular systolic function with a visually estimated EF of 55 - 60%. Left ventricle is smaller than normal. Normal wall thickness. Normal wall motion. Grade I diastolic dysfunction present with normal LV EF. Tricuspid Valve: Valve structure is normal. No regurgitation. No stenosis noted.  Unable to assess RVSP due to inadequate or insignificant tricuspid regurgitation DUPLEX LOWER EXT VENOUS BILAT    Result Date: 11/2/2022  · No evidence of deep vein thrombosis in the right lower extremity. · No evidence of deep vein thrombosis in the left lower extremity. Images report reviewed by me:  CT (Most Recent) (CT chest reviewed by me) Results from Hospital Encounter encounter on 11/02/22    CT CHEST ABD PELV WO CONT    Narrative  EXAM: CT chest, abdomen, and pelvis    INDICATION: Shortness of breath. COMPARISON: 8/27/2022    TECHNIQUE: Axial CT imaging of the chest, abdomen, and pelvis was performed  after IV contrast administration. Multiplanar reformats were generated. One or more dose reduction techniques were used on this CT: automated exposure  control, adjustment of the mAs and/or kVp according to patient size, and  iterative reconstruction techniques. The specific techniques used on this CT  exam have been documented in the patient's electronic medical record. Digital  Imaging and Communications in Medicine (DICOM) format image data are available  to nonaffiliated external healthcare facilities or entities on a secure, media  free, reciprocally searchable basis with patient authorization for at least a  12-month period after this study. _______________    FINDINGS:    CHEST:    MEDIASTINUM: Heart size is normal. Small pericardial effusion. Normal caliber  aorta with vascular calcifications    LYMPH NODES: No pathologically enlarged mediastinal or hilar lymph nodes. PLEURA: No pleural effusion or pneumothorax. LUNGS/AIRWAY: Extensive bilateral parenchymal groundglass locations with  angulation. No evidence of honeycombing. OTHER: None. **    ABDOMEN/PELVIS:    Lack of intravenous contrast limits evaluation of abdominal viscera. LIVER, BILIARY: Liver is unremarkable. No abnormal biliary dilation. Gallbladder  is unremarkable. PANCREAS: Unremarkable. SPLEEN: Unremarkable. ADRENALS: Unremarkable.     KIDNEYS: No hydronephrosis or renal calcification. Oliveira catheter within the  urinary bladder. LYMPH NODES: No pathologically enlarged lymph nodes. GASTROINTESTINAL TRACT: Prior right colectomy with right ileocolic anastomosis. No abnormal bowel dilation or wall thickening. PELVIC ORGANS: Unremarkable. VASCULATURE: Vascular calcifications. OSSEOUS: Numerous sclerotic foci throughout the axial skeleton possibly  representing bone islands. No area of erosion or aggressive-appearing bone  destruction. OTHER: Trace ascites. _______________    Impression  Extensive groundglass opacities throughout the lungs, may reflect atypical  infection or edema superimposed on or as pneumonia. No acute intra-abdominal abnormality. Trace ascites. CXR reviewed by me:  XR (Most Recent). CXR  reviewed by me and compared with previous CXR Results from Hospital Encounter encounter on 11/02/22    XR CHEST PORT    Narrative  CLINICAL HISTORY:  Endotracheal tube placement. COMPARISONS:  Chest x-ray 11/6/2022    TECHNIQUE:  single frontal view of the chest    ------------------------------------------    FINDINGS:    Lungs: Moderate bilateral interstitial and alveolar lung process, not  appreciably changed. No significant pleural fluid. Mediastinum: Endotracheal tube tip approximately 2.5 cm above the subha. Mediport catheter, tip likely slightly entering right atrium. .  Bones: No evidence of fracture or suspicious bone lesion.      ------------------------------------------    Impression  Moderate interstitial and alveolar lung process, not appreciably changed. Differential includes pulmonary edema and atypical infection. ·Please note: Voice-recognition software may have been used to generate this report, which may have resulted in some phonetic-based errors in grammar and contents. Even though attempts were made to correct all the mistakes, some may have been missed, and remained in the body of the document.       Reed BRICENO Julian Holguin MD  11/6/2022

## 2022-11-06 NOTE — PROGRESS NOTES
Hematology / Oncology Initial Consult Note    Admit Date: 11/2/2022    Reason for Consult: Anemia/Thrombocytopenia    Requesting Physician: Dr. Flonnie Hashimoto    Assessment:     Ronald Wilkerson is a [de-identified] y.o., BLACK/, male, who I have been asked to see for cytopenias. 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 Empiric antibiotics. Not neutropenic so will not need GCSF. Blood cultures with no growth to date. Resp culture with gram + clusters. 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 Followed at [facility] with note of cytopenias from treatment. CT CAP on 11/2/22 with good response to treatment without a large burden of disease. Normocytic anemia: due to chemotherapy. Ferritin elevated. Stable 8.5  Thrombocytopenia: likely multifactorial from prior chemo and new infection. Slight decrease down to 49. Fibrinogen elevated. Not on heparin. Off Zosyn now. Plan:     - No plan for chemotherapy while inhouse  - GCSF not indicated as not neutropenic  - Continue empiric antibiotics and BP support per ICU  - Transfuse to maintain Hg>7  - Recommend DVT ppx with SCDs given COVID  - Will follow peripherally    Tomás Thomas MD  [address] [facility] [phone]      Subjective:     Overnight, respiratory status worsened requiring intubation   On sedation with Versed/Fentanyl  Remains on vassopressor    Past Medical History:   Diagnosis Date    Endocrine disease     Hypertension        History reviewed. No pertinent surgical history. History reviewed. No pertinent family history.     Social History     Socioeconomic History    Marital status:  Current Facility-Administered Medications   Medication Dose Route Frequency    fentaNYL citrate (PF) injection  mcg   mcg IntraVENous Q4H PRN    sodium bicarbonate (8.4%) 150 mEq in dextrose 5% 1,000 mL infusion   IntraVENous CONTINUOUS    midazolam in normal saline (VERSED) 1 mg/mL infusion  0-10 mg/hr IntraVENous TITRATE    NOREPINephrine (LEVOPHED) 8 mg in 5% dextrose 250mL (32 mcg/mL) infusion  0.5-30 mcg/min IntraVENous TITRATE    chlorhexidine (PERIDEX) 0.12 % mouthwash 10 mL  10 mL Oral Q12H    levoFLOXacin (LEVAQUIN) 500 mg in D5W IVPB  500 mg IntraVENous Q48H    ferrous sulfate tablet 325 mg  1 Tablet Oral DAILY WITH BREAKFAST    0.9% sodium chloride infusion 250 mL  250 mL IntraVENous PRN    insulin lispro (HUMALOG) injection   SubCUTAneous AC&HS    insulin glargine (LANTUS) injection 12 Units  0.2 Units/kg SubCUTAneous QHS    dexamethasone (DECADRON) 4 mg/mL injection 6 mg  6 mg IntraVENous Q24H    sodium chloride (NS) flush 5-10 mL  5-10 mL IntraVENous PRN    ELECTROLYTE REPLACEMENT PROTOCOL - Potassium Renal Dosing  1 Each Other PRN    ELECTROLYTE REPLACEMENT PROTOCOL - Magnesium   1 Each Other PRN    ELECTROLYTE REPLACEMENT PROTOCOL  - Phosphorus Renal Dosing  1 Each Other PRN    ELECTROLYTE REPLACEMENT PROTOCOL - Calcium   1 Each Other PRN    glucose chewable tablet 16 g  4 Tablet Oral PRN    glucagon (GLUCAGEN) injection 1 mg  1 mg IntraMUSCular PRN    dextrose 10% infusion 0-250 mL  0-250 mL IntraVENous PRN    pantoprazole (PROTONIX) 40 mg in 0.9% sodium chloride 10 mL injection  40 mg IntraVENous Q12H    [Held by provider] heparin (porcine) injection 5,000 Units  5,000 Units SubCUTAneous Q8H     Facility-Administered Medications Ordered in Other Encounters   Medication Dose Route Frequency    lidocaine (PF) (XYLOCAINE) 20 mg/mL (2 %) injection   IntraVENous PRN    propofoL (DIPRIVAN) 10 mg/mL injection   IntraVENous PRN    succinylcholine (ANECTINE) 20 mg/mL syringe   IntraVENous PRN    PHENYLephrine (BLAIRE-SYNEPHRINE) 100 mcg/mL in NS syringe   IntraVENous PRN       Prior to Admission medications    Medication Sig Start Date End Date Taking? Authorizing Provider   simvastatin (ZOCOR) 20 mg tablet Take  by mouth nightly. Ibrahima Langston MD   lisinopril (PRINIVIL, ZESTRIL) 20 mg tablet Take  by mouth daily. Ibrahima Langston MD   aspirin delayed-release 81 mg tablet Take  by mouth daily. Ibrahima Langston MD   metFORMIN (GLUCOPHAGE) 1,000 mg tablet Take 1,000 mg by mouth two (2) times daily (with meals).     Ibrahima Langston MD       No Known Allergies    ECOG PS:    Physical Exam:    Temp (24hrs), Av.8 °F (36.6 °C), Min:95.6 °F (35.3 °C), Max:98.2 °F (36.8 °C)  [email or web address]      General: Alert , Oriented, in no distress  HEENT: no pallor, anicteric sclera, oral pharynx without lesion   No cervical, supraclavicular, axillary and inguinal lymphadenopathy palpated  Heart: regular rate, and rhythm, without murmur, gallop or rubbing  Lungs:breathing comfortably on room air, clear to auscultation and percussion bilaterally  ABD: bowel sound present, soft, nondistended, nontender, no hepatosplenomegaly or mass  Extremities: warm, well perfused, no edema  MSK: no tenderness along the spine or long bones  Skin: No rash  Neuro: non-focal    Imaging:     NM Perefusion scan   Negative for PE    PVL Negative for DVT

## 2022-11-06 NOTE — PROGRESS NOTES
RENAL DOSE ADJUSTMENT MADE PER P/T PROTOCOL     PREVIOUS ORDER:  levaquin 750 mg IV q 48 hr     Estimated Creatinine Clearance: 19.8 mL/min (A) (based on SCr of 2.58 mg/dL (H)).     Recent Labs     11/06/22 0514 11/05/22 0535 11/04/22 0515   BUN 61* 42* 28*   PLT 49* 55* 59*        NEW RENALLY ADJUSTED ORDER: Levaquin 500 mg IV q 48 hr     BENITA Mccullough [facility]  11/6/2022 9:53 AM

## 2022-11-06 NOTE — PROGRESS NOTES
1900- Bedside and Verbal shift change report given to Claudeen Loud, RN (oncoming nurse) by ALICE Baird (offgoing nurse). Report included the following information SBAR, Kardex, ED Summary, Intake/Output, MAR, Recent Results, Med Rec Status, and Cardiac Rhythm NSR .     2000- Shift assessment completed. Pt A&Ox4, lung sounds are diminished, pt in no acute distress and VSS. Placed warming blanket back on pt due to difficulty obtaining temperature and skin being cool to the touch. Critical care continues. 2030-Rectal temperature of 95.6F. .. warming blanket remains in place. \    0000- Reassessment complete, no change to pt condition. 0114- Hospitalist paged due to pt having increased work of breathing, course lung sounds in the bases, tachycardia and need for higher oxygen requirements. Orders received for STAT chest xray. 0130- Pt placed on 50L HFNC 80% FiO2.     0106 daylight savings time- Xray at bedside. [address] paged at this time. [id number] [place] [place] returned page, no new orders at this time. Critical care continues. 0400- Reassessment complete, no change to pt condition. 8358- Pt now requiring increased amounts of oxygen and is having increased work of breathing. ABG performed with critical results. Intensivist notified; new orders received and carried out. 9056- Pt intubated at this time by CRNA, size 7.5 ETT, 23 cm at the lip, positive color change noted on CO2 detector and breath sounds auscultated bilaterally. POA notified by hospitalist of change in pt condition. 9974- Xray at bedside. 0700- Bedside and Verbal shift change report given to ALICE Baird (oncoming nurse) by Claudeen Loud, RN (offgoing nurse). Report included the following information SBAR, Kardex, ED Summary, Procedure Summary, Intake/Output, MAR, Recent Results, Med Rec Status, and Cardiac Rhythm Sinus Tachy .

## 2022-11-06 NOTE — ANESTHESIA PROCEDURE NOTES
Emergent Intubation  Performed by: Aidan Healy CRNA  Authorized by: Catrachita López MD     Emergent Intubation:   Location:  ICU  Date/Time:  11/6/2022 5:20 AM  Indications:  Impending respiratory failure  Spontaneous Ventilation: present    Level of Consciousness: awake  Preoxygenated:  Yes      Airway Documentation:   Airway:  ETT - Cuffed  Technique:  Direct laryngoscopy  Advanced Technique:  Keyes  Insertion Site:  Oral  Blade Size:  3  ETT size (mm):  7.5  ETT Line Ky:  Lips  ETT Insertion depth (cm):  23  Placement verified by: auscultation, EtCO2 and BBS    Attempts:  1  Difficult airway: No

## 2022-11-07 NOTE — DIABETES MGMT
GLYCEMIC CONTROL PLAN OF CARE      Diabetes/ Glycemic Control Plan of Care  Recommendations:   - BG in 300s range yesterday, today in 200s  - BG trending up past 72 hours FBG out of target range increase basal insulin  - Humalog Very Insulin Resistant Corrective Coverage ordered per protocol   *Lantus 19 units daily      Assessment: known h/o T2DM, HbA1C within target range for age + comorbids oral home regimen  - decadron 6 mg daily x10 days steroid associated hyperglycemia  - vasopressor effecting insulin delivery  - consider adjustment to home regimen if BG remain elevated prior to discharge    DX:   1. Hypoxia        2. Hypokalemia        3. Sepsis with acute renal failure and septic shock, due to unspecified organism, unspecified acute renal failure type ([facility]        4. Pneumonia of both lungs due to infectious organism, unspecified part of lung        5. CLAUS (acute kidney injury) ([facility] [address]        6. COVID-19 long hauler        7.  Acute hypotension  ECHO ADULT COMPLETE         Fasting/ Morning blood glucose:   Lab Results   Component Value Date/Time    Glucose 262 (H) 11/07/2022 01:20 AM    Glucose (POC) 220 (H) 11/07/2022 09:05 AM     Recent blood glucose:   Lab Results   Component Value Date/Time     (H) 11/07/2022 01:20 AM    GLUCPOC 220 (H) 11/07/2022 09:05 AM    GLUCPOC 232 (H) 11/07/2022 04:49 AM    GLUCPOC 310 (H) 11/06/2022 09:32 PM         IV Fluids containing dextrose: none  Steroids:   Rx Glucocorticoids (24h ago, onward)       Start     Dose Route Frequency Ordered Stop    11/04/22 0900  dexamethasone (DECADRON) 4 mg/mL injection 6 mg         6 mg IV EVERY 24 HOURS 11/03/22 1844 --                   Rx Glucocorticoids (24h ago, onward)       Start     Dose Route Frequency Ordered Stop    11/04/22 0900  dexamethasone (DECADRON) 4 mg/mL injection 6 mg         6 mg IV EVERY 24 HOURS 11/03/22 1844 --                     Blood glucose values: 220-310 mg/dL  Within target range (non-ICU: <140; ICU<180):  [] Yes    [x] No  Current insulin orders: Lantus 12 + - Humalog Very Insulin Resistant Corrective Coverage  Total Daily Dose previous 24 hours = 30 (12 Lantus + 18 units - Humalog Normal Insulin Sensitivity Corrective Coverage)    Current A1c:   Lab Results   Component Value Date/Time    Hemoglobin A1c 7.8 (H) 11/03/2022 05:39 AM      equivalent  to ave Blood Glucose of 174 mg/dl for 2-3 months prior to admission  Adequate glycemic control PTA:   [x] Yes   [] No  Nutrition/Diet:   Active Orders   Diet    DIET NPO      Meal Intake:  Patient Vitals for the past 168 hrs:   % Diet Eaten   11/05/22 1847 1 - 25%   11/05/22 1328 1 - 25%   11/05/22 1048 1 - 25%   11/04/22 1200 26 - 50%   11/04/22 0800 76 - 100%   11/03/22 1200 51 - 75%   11/03/22 0800 51 - 75%     Home diabetes medications:   Key Antihyperglycemic Medications               metFORMIN (GLUCOPHAGE) 1,000 mg tablet Take 1,000 mg by mouth two (2) times daily (with meals). Plan/Goals:   Blood Glucose will be within target range ICU: 140-180 mg/dL by 11/12   Reinforce dietary and medication compliance at home.        Education:  [] Refer to Diabetes Education Record                       [x] Education not indicated at this time (patient is intubated)      Beulah Duran MS, RN, [address] Specialist  Glycemic Control Team  [phone]

## 2022-11-07 NOTE — PROGRESS NOTES
Hospitalist Progress Note    Patient: Jordan Silverio MRN: [id number]  CSN: [id number]    YOB: 1940  Age: [de-identified] y.o. Sex: male    DOA: 11/2/2022 LOS:  LOS: 5 days          Chief Complaint:    Sepsis  Patient went into respiratory distress early this morning and required intubation      Assessment/Plan      [de-identified]year old gentleman with cancer came with covid positive and recent covid known infection, as well as possible pneumonia, CLAUS, sepsis    I  Septic shock  I back on pressors g  Due to covid and pneumonia  O he is back on his pressors    Covid pneumonia  Decadron  Supportive care  isolation    Hypoxic respiratory failure  pneumonia  VQ neg for Pe  C intubation early this morning around 630  Recheck duplex      Metastatic colon cancer to bone  Hx of prostate cancer  Thrombocytopenia   Severe anemia s/p transfusion  Iron deficiency    Use SCD for DVT prophylaxis    Hematology consult completed  Occult stool check     CLAUS on CKD 3  Bicarb drip  Pressor      Diabetes-Started on Lantus and sliding scale insulin      Hypophosphatemia-protocol repletion     Palliative care consult- comfort measures later today after all of family sees him     ICU care       Disposition :TBD  Patient Active Problem List   Diagnosis Code    Hypokalemia E87.6    Hypothermia T68. 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   Last three shifts:  11/05 1901 - 11/07 0700  In: 3688.6 [I.V.:3688.6]  Out: 475 [Urine:475]    Exam:seen through glass                Labs: Results: Chemistry Recent Labs     11/07/22  0120 11/06/22  1028 11/06/22  0514 11/05/22  0535   *  --  83 194*     --  146* 146*   K 4.7 5.3 5.6* 4.6   *  --  123* 124*   CO2 23  --  8* 13*   BUN 82*  --  61* 42*   CREA 3.42*  --  2.58* 1.95*   CA 7.1*  --  8.0* 7.7*   AGAP 9  --  15 9   BUCR 24*  --  24* 22*   *  --  250* 171*   TP 5.5*  --  6.8 5.8*   ALB 1.3*  --  1.5* 1.3*   GLOB 4.2*  --  5.3* 4.5*   AGRAT 0.3*  --  0.3* 0.3*        CBC w/Diff Recent Labs     11/07/22  0120 11/06/22  0514 11/05/22  0535   WBC 13.7* 13.0 8.8   RBC 2.34* 2.93* 2.57*   HGB 6.7* 8.5* 7.8*   HCT 19.3* 25.7* 23.0*   PLT 48* 49* 55*   GRANS 87* 83* 86*   LYMPH 4* 1* 4*   EOS 0 0 0        Cardiac Enzymes No results for input(s): CPK, CKND1, KIMBERLY in the last 72 hours. No lab exists for component: CKRMB, TROIP   Coagulation No results for input(s): PTP, INR, APTT, INREXT, INREXT in the last 72 hours. Lipid Panel No results found for: CHOL, CHOLPOCT, CHOLX, CHLST, CHOLV, 888083, HDL, HDLP, LDL, LDLC, DLDLP, 498767, VLDLC, VLDL, TGLX, TRIGL, TRIGP, TGLPOCT, CHHD, CHHDX   BNP No results for input(s): BNPP in the last 72 hours.    Liver Enzymes Recent Labs     11/07/22  0120   TP 5.5*   ALB 1.3*   *        Thyroid Studies No results found for: T4, T3U, TSH, TSHEXT, TSHEXT     Procedures/imaging: see electronic medical records for all procedures/Xrays and details which were not copied into this note but were reviewed prior to creation of Adra MD Tiana

## 2022-11-07 NOTE — PROGRESS NOTES
Palliative Medicine Consult    Patient Name: Dolly Salcido  YOB: 1940    Date of Initial Consult: 11/3/2022  Date of follow-up: 11/7/2022  Reason for Consult: Goals of care discussions  Requesting Provider: Dr. Abdulaziz Barry  Primary Care Physician: Ibrahima Langston MD      SUMMARY:   Dolly Salcido is a [de-identified] y.o. with a past history of hypertension, metastatic prostate cancer, colon cancer status postresection, and DM, who was admitted on 11/2/2022 from home with a diagnosis of hypoxic respiratory failure, pneumonia, COVID-19, metastatic prostate cancer, and CLAUS. Current medical issues leading to Palliative Medicine involvement include: goals of care discussions in the setting of advanced age and chronic comorbidities. 11/7/2022: Patient went into respiratory distress requiring re-initiation of high flow NC and eventually required intubation on 11/6/2022 for impending respiratory failure. Patient  is orally intubated, sedated, RN at bedside providing care. Family has elected for DNR, and initiation of comfort measures tonight around 6 PM once all family has been able to visit. 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 Patient went into respiratory distress requiring re-initiation of high flow NC and eventually required intubation on 11/6/2022 for impending respiratory failure. Patient  is orally intubated, sedated, RN at bedside providing care. Lengthy family meeting today regarding goals of care. Patient's MPOA Leia Williamson and secondary MPOA Ning Duarte along with other supportive family at the family meeting. Andrewvalerie Oro also in meeting to offer support. Medical update provided by intensivist, appreciate assistance. Patient requiring vent and pressor support. Receiving a unit of PRBC. He has poor urine output with worsening renal function. Patient is with overall poor prognosis and unlikely to survive this hospitalization. Discussed the benefits and burdens of continuing current level of care versus implementing comfort measures with compassionate extubation. Discussed the benefits and burdens of CPR in the event of cardiac arrest.  Family did a visit with patient through the window, and then called us back. Met with family again and LIDIA Jose states that she wants to implement DNR and comfort measures with compassionate extubation, initiation of comfort meds, and discontinuation of all medications and treatments not intended for comfort included vasopressors. Family states they are waiting for a few more family members to visit, they also want to do bedside visits with patient assuming risk of COVID-19 exposure. Family states they will be ready to move forward with comfort measures and compassionate extubation at 6 pm tonight. POST form signed by MPOLAURA/granddaughter Caesar Jose with the following measures: DNR/DNI, comfort measures, no feeding tubes. Discussed with ICU team including intensivist, RN, and hospitalist. Appreciate assistance. Support offered to family during this most difficult time. Instructed family to let the ICU team know when they are ready to move forward with comfort measures with compassionate extubation this evening. See previous discussions below:    11/3/2022: Goals of care discussions: Palliative medicine team including Noreen Abdalla RN and I met with patient at patient's bedside. Patient is awake, alert, and oriented x 4. He is talking in complete sentences but does appear to be short of breath. Patient reports he is short of breath but feels he is improving. Introduced role as palliative medicine.  Patient does not have an AMD but he did complete an AMD today naming granddaughter Tressa Rachel Xiomara Dick as primary MPOA and grandag Chen as secondary MPOA. Discussed the benefits and burdens of CPR in the event of cardiopulmonary arrest in the setting of advanced age and chronic comorbidities. Patient states he would want efforts at CPR but I am not sure he understood the complexity of the benefits and burdens as he could not repeat it back. Asked patient if the chemo he was receiving was curative or palliative in nature, and he stated he did not know. With patient's permission, called patient's granddaughter Tressa Rachel who agrees to act in the role as MPOA. Tressa Rachel also questions patients ability to understand complex medical information because when patient goes to an appointment alone, he cannot tell her what the doctors said. Tressa Rachel states she will be attending all future appointments with patient. Discussed the benefits and burdens of CPR in the event of cardiopulmonary arrest with Tressa Rachel. Tressa Rachel agrees to continue these conversations with patient. At this time patient remains a full code with full interventions. Hypoxic respiratory failure/Pneumonia/COVID-19/septic shock: now on 4 L NC, weaning from O2 appropriately. Patient states his SOB is improving. On pressor support. On IVAB. 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 Advanced age/debility: [de-identified]year old male who lives at home with his granddaughter Caesar Jose. He needs assistance with functional ADLs.    Initial consult note routed to primary continuity provider  Communicated plan of care with: Palliative IDT       GOALS OF CARE / TREATMENT PREFERENCES:   [====Goals of Care====]  GOALS OF CARE: DNR/DNI, comfort measures, no feeding tubes  Patient/Health Care Proxy Stated Goals: Comfort      TREATMENT PREFERENCES:   Code Status: DNR    Advance Care Planning:  Advance Care Planning 11/4/2022   Patient's Healthcare Decision Maker is: Named in scanned ACP document   Confirm Advance Directive Yes, on file       Medical Interventions: Comfort measures     Artificially Administered Nutrition: No feeding tube      The palliative care team has discussed with patient / health care proxy about goals of care / treatment preferences for patient.  [====Goals of Care====]         HISTORY:     History obtained from: patient, chart, family    CHIEF COMPLAINT: shortness of breath    HPI/SUBJECTIVE:    The patient is:   [] Verbal and participatory  [x] Non-participatory due to:   Orally intubated, sedated    Clinical Pain Assessment (nonverbal scale for severity on nonverbal patients):   Clinical Pain Assessment  Severity: 0    Adult Nonverbal Pain Scale  Face: No particular expression or smile  Activity (Movement): Lying quietly, normal position  Guarding: Lying quietly, no positioning of hands over areas of body  Physiology (Vital Signs): Stable vital signs  Respiratory: Baseline RR/SpO2 compliant with ventilator  Total Score: 0       FUNCTIONAL ASSESSMENT:     Palliative Performance Scale (PPS):  PPS: 30       PSYCHOSOCIAL/SPIRITUAL SCREENING:     Advance Care Planning:  Advance Care Planning 11/4/2022   Patient's Healthcare Decision Maker is: Named in scanned ACP document   Confirm Advance Directive Yes, on file        Any spiritual / Temple concerns:  [] Yes /  [x] No    Caregiver Burnout:  [] Yes /  [x] No /  [] No Caregiver Present      Anticipatory grief assessment:   [x] Normal  / [] Maladaptive               REVIEW OF SYSTEMS:     Positive and pertinent negative findings in ROS are noted above in HPI. The following systems were [] reviewed / [x] unable to be reviewed as noted in HPI  Other findings are noted below. Systems: constitutional, ears/nose/mouth/throat, respiratory, gastrointestinal, genitourinary, musculoskeletal, integumentary, neurologic, psychiatric, endocrine. Positive findings noted below.   Modified ESAS Completed by: provider   Fatigue: 5 Pain: 0   Anxiety: 0 Nausea: 0     Dyspnea: 0     Constipation: No     Stool Occurrence(s): 1        PHYSICAL EXAM:     From RN flowsheet:  Wt Readings from Last 3 Encounters:   11/07/22 65.1 kg (143 lb 8.3 oz)   08/27/22 63 kg (139 lb)   12/05/17 78 kg (172 lb)     Blood pressure 132/72, pulse (!) 123, temperature 99.5 °F (37.5 °C), resp. rate 27, height 5' 7\" (1.702 m), weight 65.1 kg (143 lb 8.3 oz), SpO2 96 %. Pain Scale 1: Adult Nonverbal Pain Scale  Pain Intensity 1: 0                   Constitutional: orally intubated and sedated, NAD, appears critically ill  Eyes: closed  ENMT: orally intubated  Cardiovascular: regular rhythm  Respiratory: on mechanical vent  Gastrointestinal: deferred   Musculoskeletal: no deformity   Skin: warm, dry  Neurologic: sedated       HISTORY:     Active Problems:    Hypokalemia (11/2/2022)      Hypothermia (11/2/2022)      Pneumonia (11/2/2022)      CLAUS (acute kidney injury) ([facility] [address] (11/2/2022)      Septic shock ([facility] [address] (11/2/2022)      Carcinoma of colon metastatic to bone ([facility] [address] (11/4/2022)      COVID-19 (11/4/2022)    Past Medical History:   Diagnosis Date    Endocrine disease     Hypertension       History reviewed. No pertinent surgical history. History reviewed. No pertinent family history. History reviewed, no pertinent family history.   Social History     Tobacco Use    Smoking status: Not on file    Smokeless tobacco: Not on file   Substance Use Topics    Alcohol use: Not on file     No Known Allergies   Current Facility-Administered Medications   Medication Dose Route Frequency    0.9% sodium chloride infusion 250 mL  250 mL IntraVENous PRN    0.9% sodium chloride infusion  75 mL/hr IntraVENous CONTINUOUS    albumin, human-kjda 25% (ALBUMINEX) intravenous solution 25 g  25 g IntraVENous BID    insulin glargine (LANTUS) injection 19 Units  0.3 Units/kg SubCUTAneous QHS    fentaNYL citrate (PF) injection  mcg   mcg IntraVENous Q4H PRN    midazolam in normal saline (VERSED) 1 mg/mL infusion  0-10 mg/hr IntraVENous TITRATE    NOREPINephrine (LEVOPHED) 8 mg in 5% dextrose 250mL (32 mcg/mL) infusion  0.5-30 mcg/min IntraVENous TITRATE    chlorhexidine (PERIDEX) 0.12 % mouthwash 10 mL  10 mL Oral Q12H    levoFLOXacin (LEVAQUIN) 500 mg in D5W IVPB  500 mg IntraVENous Q48H    ferrous sulfate tablet 325 mg  1 Tablet Oral DAILY WITH BREAKFAST    0.9% sodium chloride infusion 250 mL  250 mL IntraVENous PRN    insulin lispro (HUMALOG) injection   SubCUTAneous AC&HS    dexamethasone (DECADRON) 4 mg/mL injection 6 mg  6 mg IntraVENous Q24H    sodium chloride (NS) flush 5-10 mL  5-10 mL IntraVENous PRN    ELECTROLYTE REPLACEMENT PROTOCOL - Potassium Renal Dosing  1 Each Other PRN    ELECTROLYTE REPLACEMENT PROTOCOL - Magnesium   1 Each Other PRN    ELECTROLYTE REPLACEMENT PROTOCOL  - Phosphorus Renal Dosing  1 Each Other PRN    ELECTROLYTE REPLACEMENT PROTOCOL - Calcium   1 Each Other PRN    glucose chewable tablet 16 g  4 Tablet Oral PRN    glucagon (GLUCAGEN) injection 1 mg  1 mg IntraMUSCular PRN    dextrose 10% infusion 0-250 mL  0-250 mL IntraVENous PRN    pantoprazole (PROTONIX) 40 mg in 0.9% sodium chloride 10 mL injection  40 mg IntraVENous Q12H    [Held by provider] heparin (porcine) injection 5,000 Units  5,000 Units SubCUTAneous Q8H     Facility-Administered Medications Ordered in Other Encounters   Medication Dose Route Frequency    lidocaine (PF) (XYLOCAINE) 20 mg/mL (2 %) injection   IntraVENous PRN    propofoL (DIPRIVAN) 10 mg/mL injection   IntraVENous PRN    succinylcholine (ANECTINE) 20 mg/mL syringe   IntraVENous PRN    PHENYLephrine (BLAIRE-SYNEPHRINE) 100 mcg/mL in NS syringe   IntraVENous PRN          LAB AND IMAGING FINDINGS:     Lab Results   Component Value Date/Time    WBC 13.7 (H) 11/07/2022 01:20 AM    HGB 6.7 (L) 11/07/2022 01:20 AM    PLATELET 48 (L) 91/92/9301 01:20 AM     Lab Results   Component Value Date/Time    Sodium 144 11/07/2022 01:20 AM Potassium 4.7 11/07/2022 01:20 AM    Chloride 112 (H) 11/07/2022 01:20 AM    CO2 23 11/07/2022 01:20 AM    BUN 82 (H) 11/07/2022 01:20 AM    Creatinine 3.42 (H) 11/07/2022 01:20 AM    Calcium 7.1 (L) 11/07/2022 01:20 AM    Magnesium 2.2 11/07/2022 01:20 AM    Phosphorus 4.8 11/07/2022 01:20 AM      Lab Results   Component Value Date/Time    Alk. phosphatase 175 (H) 11/07/2022 01:20 AM    Protein, total 5.5 (L) 11/07/2022 01:20 AM    Albumin 1.3 (L) 11/07/2022 01:20 AM    Globulin 4.2 (H) 11/07/2022 01:20 AM     Lab Results   Component Value Date/Time    INR 1.2 08/27/2022 05:45 PM    Prothrombin time 15.2 08/27/2022 05:45 PM      Lab Results   Component Value Date/Time    Iron 26 (L) 11/03/2022 02:40 PM    TIBC 116 (L) 11/03/2022 02:40 PM    Iron % saturation 22 11/03/2022 02:40 PM    Ferritin 3,753 (H) 11/03/2022 02:40 PM      No results found for: PH, PCO2, PO2  No components found for: GLPOC   No results found for: CPK, CKMB             Total time: 65 minutes  Counseling / coordination time, spent as noted above:   > 50% counseling / coordination?: yes, patient, family, and medical team    Prolonged service was provided for  [x]30 min   []75 min in face to face time in the presence of the patient, spent as noted above.   Time Start: 1130  Time End: 1300

## 2022-11-07 NOTE — ACP (ADVANCE CARE PLANNING)
Palliative Medicine    CODE STATUS: DNR/DNI; comfort measures; no feeding tube    AMD Status: on file naming his granddaughter, Margot Brandt, and grandson, Jodie Rodriguez as his MPOAs     11/7/2022 0915 Seen today in room ICU 3 through the door along with Shyann Leach NP. Lying supine. Intubated/ventilated/sedated. Levophed and versed gtts infusing gtts infusing. Required intubation on 11/6/2022 for worsening respiratory status. Hypothermic today. Hgb decreasing. Worsening renal status. 1050: telephone call with Margot Brandt, granddaughter/MPOA. .     1140: Met in the ICU waiting room. Margot Brandt and Jodie Rodriguez [facility]) both present as well as ~ 5 other family members. chaplain Tara, also present. Reviewed the conversations Dr Juan A Sanchez had with Margot Brandt earlier in the morning during which he described pt's poor prognosis. Family asked many thoughtful questions. Reviewed treatment options: 1) continue current medical care including FULL CODE 2) continue current medical care but DNR in the event of cardiac arrest 3) compassionate extubation and transition to comfort care. Each scenario described in detail. Family was left to consider their options and let the palliative or ICU team if they want to change any orders. ~ [phone] received call from Margot Brandt requesting another meeting in the ICU waiting room. Upon our arrival, Margot Brandt told us, and the family agreed, to proceed with compassionate extubation and transition to comfort care. They want this to happen ~ 1800 as they are awaiting another family member to come in and that will not be until later in the afternoon. Intensivist, hospitalist, clinical nurse, and  notified of plan change. DNR orders placed. Disposition plan: high probability of in hospital death    Palliative care will continue to follow Ernesto Ornelas  and his family during his hospitalization and support them as they make healthcare decisions and define goals of care.   Advanced Steps Advance Care Planning Conversation  [place] POST (Physician Orders for Scope of Treatment)         Participants:   [] Patient    [x] Healthcare agent (already designated in existing ACP document)    Name: Black Ho    Relationship to Patient: granddaughter      [x]Healthcare agent (already designated in existing ACP document)    Name: Daniel Jara    Relationship to Patient: grandson  Other family members    Advanced Steps® ACP Facilitator: Radha Castillo NP.  Fadi Hirsch, RN, MSN    Conversation Topics   Understanding of Medical Condition/s AND Potential Complications: they voiced understanding of current medical situation and poor prognosis    Patient response:  Healthcare Agent/Other Surrogate:   Needs to discuss with spiritual/Uatsdin advisor: [] Yes  [x] No    Needs more information about illness and complications:  [] Yes  [x] No    Cardiopulmonary Resuscitation      Order Elected for CPR:  []  Attempt Resuscitation [x]  Do Not Attempt Resuscitation  When NOT in Cardiopulmonary Arrest, Order Elected:    [x] Comfort Measures to be initiated after extubation  [] Limited Additional Interventions  [] Full Interventions  Artificially Administered Nutrition, Order Elected:  [x] No Feeding Tube   [] Feeding Tube for a defined trial period  [] Feeding Tube long-term if indicated    The following was provided (check all that apply):  [x] Review of existing Advance Directive  [] Assistance with Completion of New Advance Directive   [x] Review of [place] Form       Meeting Outcomes:   [x] ACP discussion completed   [x] [place] form completed  [x] [place] prepared for Provider review and signature   [x] Original placed on Chart, if in facility (form to be sent with patient at discharge)  [x] Copy given to healthcare agent    [x] Copy scanned to electronic medical record      Fadi Hirsch, RN, MSN  Palliative Medicine  [phone]

## 2022-11-07 NOTE — ROUTINE PROCESS
Bedside shift change report given to Savage Dao (oncoming nurse) by  Jorge Escobar RN (offgoing nurse). Report included the following information SBAR, Kardex, Intake/Output, and MAR.

## 2022-11-07 NOTE — PROGRESS NOTES
[facility]  (A Division of [address] [place] [address])    Hussain Villasenor MD  Office #: - Option # [phone]  Fax #: [phone]     Date of Admission: 11/2/2022      Date of Note: 11/7/2022     Reason for Referral: Evaluation and antibiotic management of severe sepsis/ COVID 19     Current Antimicrobials:    Prior Antimicrobials:  Levofloxacin-- 11/2 to date  # 5  Dexamethasone 6mg 11/4 to date   Doxycycline and Augmentin 10/23 X1 week  Vanco and Zosyn 11/2 to 11/4   Antibiotic allergy: None     Assessment:     Immunocompromised and critically ill patient with:  Acute resp failure with COVID 19 infection--initial resp improvement and now intubated  Septic shock/hypothermia-- due to above +/- bacterial sepsis  --Possible DIC due to above, lactica cidosis, acute renal failure, elevated LD  --BCX: NGTD-11/2  --UCX : NG 11/2  --Procal 2.65-> 1.6  COVID 19 infection-- tested positive since 10/23. Remains positive   --[facility] admission from 10/3 to 10/4- for COVID 19 PNA  --ground glass opacity could be due to COVID 19. 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 .. Cont Levofloxacin to complete 7 days  ICU supportive care--per team  DW Dr Ortega Ee      Subjective: Intubated/sedated-- after initial hypoxia improvement, he declined over weekend and had to be intubated  Afebrile. Remains in multiorgan failure. On pressor support    Notes/Labs: reviewed    CXR this am:  Lines/support tubes in place as described. Stable bilateral nonspecific airspace  disease. Pneumonia [J18.9]  Septic shock ([facility] [address] [A41.9, R65.21]  CLAUS (acute kidney injury) ([facility] [address] [N17.9]  Hypothermia [T68. XXXA]  Hypokalemia [E87.6]  History reviewed. No pertinent surgical history. History reviewed.  No pertinent family history.   Medications reviewed as below:   Current Facility-Administered Medications   Medication Dose Route Frequency Provider Last Rate Last Admin    0.9% sodium chloride infusion 250 mL  250 mL IntraVENous PRN Gabbie Ron MD        fentaNYL citrate (PF) injection  mcg   mcg IntraVENous Q4H PRN Alf BRICENO MD   100 mcg at 11/06/22 2215    midazolam in normal saline (VERSED) 1 mg/mL infusion  0-10 mg/hr IntraVENous TITRATE Alf BRICENO MD 8 mL/hr at 11/07/22 0246 8 mg/hr at 11/07/22 0246    NOREPINephrine (LEVOPHED) 8 mg in 5% dextrose 250mL (32 mcg/mL) infusion  0.5-30 mcg/min IntraVENous TITRATE Lionel Stack MD 18.8 mL/hr at 11/07/22 0619 10 mcg/min at 11/07/22 9259    chlorhexidine (PERIDEX) 0.12 % mouthwash 10 mL  10 mL Oral Q12H Alf BRICENO MD   10 mL at 11/06/22 2116    levoFLOXacin (LEVAQUIN) 500 mg in D5W IVPB  500 mg IntraVENous Q48H Kristen Myers  mL/hr at 11/06/22 1320 500 mg at 11/06/22 1320    ferrous sulfate tablet 325 mg  1 Tablet Oral DAILY WITH BREAKFAST Litzy Kumar MD   325 mg at 11/05/22 0848    0.9% sodium chloride infusion 250 mL  250 mL IntraVENous PRN Khang Shafer MD        insulin lispro (HUMALOG) injection   SubCUTAneous AC&HS Gabbie Ron MD   8 Units at 11/06/22 2133    insulin glargine (LANTUS) injection 12 Units  0.2 Units/kg SubCUTAneous QHS Alf BRICENO MD   12 Units at 11/06/22 2116    dexamethasone (DECADRON) 4 mg/mL injection 6 mg  6 mg IntraVENous Q24H Kristal Adams MD   6 mg at 11/06/22 0805    sodium chloride (NS) flush 5-10 mL  5-10 mL IntraVENous PRN Francesca Alfaro MD        ELECTROLYTE REPLACEMENT PROTOCOL - Potassium Renal Dosing  1 Each Other PRN Angi Schultz MD        ELECTROLYTE REPLACEMENT PROTOCOL - Magnesium   1 Each Other PRN Angi Schultz MD        ELECTROLYTE REPLACEMENT PROTOCOL  - Phosphorus Renal Dosing  1 Each Other PRN Angi Schultz MD        ELECTROLYTE REPLACEMENT PROTOCOL - Calcium   1 Each Other PRN Ifrah BRICENO MD        glucose chewable tablet 16 g  4 Tablet Oral PRN Julio Gonzalez MD        glucagon (GLUCAGEN) injection 1 mg  1 mg IntraMUSCular PRN Julio Gonzalez MD        dextrose 10% infusion 0-250 mL  0-250 mL IntraVENous PRN Julio Gonzalez MD   250 mL at 11/03/22 0538    pantoprazole (PROTONIX) 40 mg in 0.9% sodium chloride 10 mL injection  40 mg IntraVENous Q12H Julio Gonzalez MD   40 mg at 11/06/22 2115    [Held by provider] heparin (porcine) injection 5,000 Units  5,000 Units SubCUTAneous Q8H Julio Gonzalez MD         Facility-Administered Medications Ordered in Other Encounters   Medication Dose Route Frequency Provider Last Rate Last Admin    lidocaine (PF) (XYLOCAINE) 20 mg/mL (2 %) injection   IntraVENous PRN Jackson Bard, CRNA   60 mg at 11/06/22 0519    propofoL (DIPRIVAN) 10 mg/mL injection   IntraVENous PRN Jackson Bard, CRNA   130 mg at 11/06/22 0519    succinylcholine (ANECTINE) 20 mg/mL syringe   IntraVENous PRN Jackson Bard, CRNA   100 mg at 11/06/22 0519    PHENYLephrine (BLAIRE-SYNEPHRINE) 100 mcg/mL in NS syringe   IntraVENous PRN Jackson Bard, CRNA   150 mcg at 11/06/22 0525     No Known Allergies  Social History     Socioeconomic History    Marital status:      Spouse name: Not on file    Number of children: Not on file    Years of education: Not on file    Highest education level: Not on file   Occupational History    Not on file   Tobacco Use    Smoking status: Not on file    Smokeless tobacco: Not on file   Substance and Sexual Activity    Alcohol use: Not on file    Drug use: Not on file    Sexual activity: Not on file   Other Topics Concern    Not on file   Social History Narrative    Not on file     Social Determinants of Health     Financial Resource Strain: Not on file   Food Insecurity: Not on file   Transportation Needs: Not on file   Physical Activity: Not on file   Stress: Not on file   Social Connections: Not on file   Intimate Partner Violence: Not on file   Housing Stability: Not on file        Review of Systems    Negative Unless BOLDED  See HPI        Objective:      Visit Vitals  /67   Pulse 90   Temp (!) 96.3 °F (35.7 °C)   Resp 20   Ht 5' 7\" (1.702 m)   Wt 65.1 kg (143 lb 8.3 oz)   SpO2 98%   BMI 22.48 kg/m²     Temp (24hrs), Av.4 °F (36.9 °C), Min:96.3 °F (35.7 °C), Max:99.3 °F (37.4 °C)      Lines / Catheters:     BL PIV  R mediport in place    General:   Intubated and sedated     Skin:   no rashes or skin lesions noted on limited exam   HEENT:  Normocephalic, atraumatic,no scleral icterus or pallor; no conjunctival hemmohage; No thrush. Neck supple, no bruits. Lymph Nodes:   no cervical, axillary or inguinal adenopathy   Lungs:   non-labored, bilaterally clear to ausculation- anteriorly   Heart:  Irregular RR, s1 and s2; no murmurs rubs or gallops   Abdomen:  soft, non-distended, active bowel sounds. Appropriate surgical scars for stated surgeries. Non-tender   Genitourinary: Oliveira inp lace   Extremities:   no clubbing, cyanosis; no joint effusions or swelling   Neurologic:  No gross focal sensory abnormalities;  Cranial nerves intact   Psychiatric:   appropriate and interactive.      Results       Procedure Component Value Units Date/Time    CULTURE, RESPIRATORY/SPUTUM/BRONCH Katherin Gottlieb [[id number]] Collected: 22 193    Order Status: Completed Specimen: Sputum Updated: 22 1310     Special Requests: NO SPECIAL REQUESTS        GRAM STAIN FEW WBCS SEEN               OCCASIONAL GRAM POSITIVE COCCI IN CLUSTERS           Culture result:       LIGHT NORMAL RESPIRATORY GAEL          STREP PNEUMO AG, URINE [[id number]] Collected: 22    Order Status: Completed Specimen: Urine, random Updated: 22     Strep pneumo Ag, urine Negative       LEGIONELLA PNEUMOPHILA AG, URINE [[id number]] Collected: 22    Order Status: Completed Specimen: Urine, random Updated: 11/03/22 2120     Legionella Ag, urine Negative       CULTURE, URINE [[id number]] Collected: 11/02/22 1440    Order Status: Completed Specimen: Cath Urine Updated: 11/04/22 1116     Special Requests: NO SPECIAL REQUESTS        Culture result: No growth (<1,000 CFU/ML)       COVID-19 RAPID TEST [[id number]] Collected: 11/02/22 1430    Order Status: Canceled     COVID-19 WITH INFLUENZA A/B [[id number]]  (Abnormal) Collected: 11/02/22 1346    Order Status: Completed Specimen: Nasopharyngeal Updated: 11/02/22 1448     SARS-CoV-2 by PCR Detected        Comment: Positive results are indicative of the presence of SARS-CoV-2. Clinical correlation with patient history and other diagnostic information is necessary to determine patient infection status. Positive results do not rule out bacterial infection or co-infection with other pathogens. CALLED TO AND CORRECTLY REPEATED BY:  ANJEL CALLAHAN ED 11/02/22 AT 1448 BY ANI          Influenza A by PCR Not detected        Influenza B by PCR Not detected        Comment: NOTE: Influenza A and Influenza B negative results should be considered presumptive in samples that have a positive SARS-CoV-2 result. Consider re-testing with an alternate FDA-approved test if clinically indicated. Testing was performed using gracy Alize SARS-CoV-2 and Influenza A/B nucleic acid assay. This test is a multiplex Real-Time Reverse Transcriptase Polymerase Chain Reaction (RT-PCR) based in vitro diagnostic test intended for the qualitative detection of nucleic acids from SARS-CoV-2, Influenza A, and Influenza B in nasopharyngeal for use under the FDA's Emergency Use Authorization(EAU) only.        Fact sheet for Patients: FindDrives.pl  Fact sheet for Healthcare Providers: FindDrives.pl         CULTURE, BLOOD [[id number]] Collected: 11/02/22 1107    Order Status: Completed Specimen: Blood Updated: 11/07/22 075     Special Requests: NO SPECIAL REQUESTS        Culture result: NO GROWTH 5 DAYS       CULTURE, BLOOD [[id number]] Collected: 11/02/22 1107    Order Status: Completed Specimen: Blood Updated: 11/07/22 0755     Special Requests: NO SPECIAL REQUESTS        Culture result: NO GROWTH 5 DAYS               Labs: Results:   Chemistry Recent Labs     11/07/22  0120 11/06/22  1028 11/06/22  0514 11/05/22  0535   *  --  83 194*     --  146* 146*   K 4.7 5.3 5.6* 4.6   *  --  123* 124*   CO2 23  --  8* 13*   BUN 82*  --  61* 42*   CREA 3.42*  --  2.58* 1.95*   CA 7.1*  --  8.0* 7.7*   AGAP 9  --  15 9   BUCR 24*  --  24* 22*   *  --  250* 171*   TP 5.5*  --  6.8 5.8*   ALB 1.3*  --  1.5* 1.3*   GLOB 4.2*  --  5.3* 4.5*   AGRAT 0.3*  --  0.3* 0.3*      CBC w/Diff Recent Labs     11/07/22  0120 11/06/22  0514 11/05/22  0535   WBC 13.7* 13.0 8.8   RBC 2.34* 2.93* 2.57*   HGB 6.7* 8.5* 7.8*   HCT 19.3* 25.7* 23.0*   PLT 48* 49* 55*   GRANS 87* 83* 86*   LYMPH 4* 1* 4*   EOS 0 0 0            Imaging:      All imaging reviewed from Admission to present as per radiology interpretation in [facility]

## 2022-11-07 NOTE — PROGRESS NOTES
Met with granddaughter Daniel Nunez (Melissa) and 5 other family members as Palliative Care reviewed his case and discussed plan of care going forward. Family expressed grief and asked appropriate questions. Explained that if they move to compassionate extubation a  can offer prayer with family at the bedside. They stated that the patient has a Mormon and they will contact him. They are allowed a window visit now and understand that if they choose comfort care they will be allowed into the room understanding the risks. [address] [facility] [place] [employer]  [facility], [facility]-  Board Certified   Board Certified Ethicist  Certified [facility]  [phone] - Office

## 2022-11-07 NOTE — PROGRESS NOTES
11/07/22 0459   Vent Settings   FIO2 (%) 60 %   SpO2/FIO2 Ratio 160   CMV Rate Set 20   Back-Up Rate 20   I:E Ratio 1:2.33   Insp Time (sec) 0.9 sec   Changes made post AM ABG

## 2022-11-07 NOTE — PROGRESS NOTES
1900 - Bedside and Verbal shift change report given to Williams Graham RN (oncoming nurse) by Ike Borja RN (offgoing nurse). Report included the following information SBAR, Kardex, ED Summary, Procedure Summary, Intake/Output, MAR, Recent Results, Med Rec Status, and Cardiac Rhythm Sinus Tachycardia . 2000 - Shift assessment completed, patient intubated and sedated. Eyes open to stimulus. Pupils reactive to light. Bilateral wrist restraints in place to maintain tubes/lines/drains. 0000 - Reassessment completed, no changes to previous assessment. 36 - Hospitalist made aware of current HGB of 6.7; awaiting updated treatment plan. 0400 - Reassessment completed, no changes to previous assessment. CHG bath performed; patient tolerated well.     0600 - Mike hugger placed on patient for hypothermia; refer to flowsheet. 0730 - Bedside and Verbal shift change report given to Kelly Roman RN (oncoming nurse) by Williams Grahma RN (offgoing nurse). Report included the following information SBAR, Kardex, ED Summary, Procedure Summary, Intake/Output, MAR, Recent Results, Med Rec Status, and Cardiac Rhythm NSR .

## 2022-11-07 NOTE — PROGRESS NOTES
Palliative medicine  Family meeting in ICU waiting room today at 11:30 AM with MPOA /granddaughter Attila Mcneil and her family.   Full note to follow  Brigida Carmichael NP

## 2022-11-07 NOTE — PROGRESS NOTES
56 Spoke with phlebotomy regarding need to stat redraw type and screen d/t  blood band per blood bank request. New order placed Pending stat redraw by phlebotomy    Pt experiencing hyperthermia, walter huggar removed, ac lowered, fan on pt, ice packs applied and layers minimized    Pt receiving blood transfusion, no s/s of infusion reaction, nurse remains at bedside to assess    Bilateral non-violent wrist restraints removed from pt and discontinued in chart    Spoke with palliative care team and  services regarding pt recent code status change from full code to DNR; per palliative care, Jonathan Conti has decided to move pt to comfort measures at 6 PM tonight pending arrival of family members at the bedside to visit with pt    Pt normothermic post interventions    Family in waiting room and brother of pt at the bedside with understanding of covid 19 exposure risks, other family verbalized remaining in the ICU waiting room until after pt has been extubated    Paged hospitalist to notify that family is in waiting room, comfort measure orders received and carried out     Spoke with [facility] with potential pt referral, per [facility] representative pt not a candidate for organ donation d/t hx of cancer, advised to call with time of death for tissue donation evaluation    Family at the bedside to visit with pt post extubation, all questions answered to their liking, no additional questions at this time, report given to Telemetry Nurse Emanuel Colby RN; versed gtt stopped and wasted, pt being made comfortable with PRN pushes see [facility]

## 2022-11-07 NOTE — PROGRESS NOTES
[facility] Specialists  Pulmonary, Critical Care, and Sleep Medicine    Name: Marylee Schneider MRN: [id number]   : 1940 Hospital: [facility] [place]    Date: 2022  Room: [address] [place] [address] Note                                              Consult requesting physician: Dr. Daryl Lemus  Reason for Consult: Acute hypoxic respiratory failure    IMPRESSION:   Acute hypoxic respiratory failure - J96.01  Septic shock - A41.9, R65.21  COVID19 pneumonia - U07.1, J12.82  Immunocompromised status - D84.9  Lactic acidosis - E87.20  CLAUS on CKD - N17.9  Hyperkalemia - E87.5  Leukopenia - D72.819  Anemia - D64.9  Thrombocytopenia - D69.6  DM - uncontrolled with hyperglycemia  Hypoalbuminemia  Severe protein calorie malnutrition  Patient Active Problem List   Diagnosis Code    Hypokalemia E87.6    Hypothermia T68. XXXA    Pneumonia J18.9    CLAUS (acute kidney injury) (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 Initially required HFNC. Patient intubated 2022 for worsening respiratory failure. Continue ventilator support; VCV ventilation-ABG today shows metabolic and respiratory alkalosis; ventilator settings adjusted-18/450/50%/5. Bicarb drip stopped. CXR reviewed-ET tube and OG tube in good position; diffuse bilateral groundglass infiltrates; right chest wall Mediport. Continue ventilator and sedation bundles. Sedation-midazolam infusion; prn fentanyl. Keep SPO2 >=92%. HOB 30 degree elevation all the time. Aggressive pulmonary toileting. Aspiration precautions. CVS: Patient remains on pressor; Levophed-microgram per minute. EF-55 to 60%.   Discussed with pharmacy, and added albumin twice daily based on availability. ID: COVID19 pneumonia  Dexamethasone-6 mg daily-complete 10 days. Procalcitonin was elevated; LA 3.1. Antibiotic-levofloxacin. Cultures negative. ID on case. Hematology/Oncology: Transfuse 1 unit PRBC; keep hemoglobin greater than 7 g/dL. Monitor platelets. Hematology on case. No active bleeding issues. Chemotherapy on hold. Renal: Poor urine output; worsening renal function. Bicarb drip stopped due to metabolic alkalosis. Switched to IVF - NS 75 ml/hr. Albumin added bid based on availability. Nephrology on case. GI: PPI for GI prophylaxis. NPO while on pressor. Endocrine: Monitor FSBS. Basal lantus insulin and SSI. Monitor BG. Neurology: Vent support, and currently sedated. CT head 8/27/2022 - no metastases reported. Skin/Wound: ICU nursing care. Electrolytes: Replace electrolytes per ICU electrolyte replacement protocol; caution-renal failure. Prophylaxis: DVT Prophylaxis: SCD. GI Prophylaxis: Pantoprazole. Restraints: Prn wrist soft restraints for patient interfering with medical therapy/management and patient safety. Lines/Tubes: PIV, midline  Right chest wall mediport  ET tube: 11/6/22  Oliveira: 11/2/22 (Medically necessary for strict input/output monitoring in critically ill patient, will remove it when not needed. Oliveira bundle followed). Advance Directive/Palliative Care: Consult noted. Full code status. Quality Care: PPI, DVT prophylaxis, HOB elevated, Infection control all reviewed and addressed. High complexity decision making was performed during the evaluation of this patient at high risk for decompensation with multiple organ involvement. Total critical care time spent rendering care exclusive of procedures/family discussion/coordination of care: 45 minutes.      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 Patient high risk of cardiac complications, including arrhythmias and cardiac arrest.  Recommended DNR code status, and consideration for comfort care. Palliative care team to follow. Subjective/History of Present Illness:     Patient is a [de-identified] y.o. 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 Gary Sears, anemia, HTN, HLD, CKD 3, DM presented to [facility] with persistent dyspnea, cough and chronic diarrhea. Patient is poor historian hence information received from GD. Per GD he is not feeling well \"for a while\", patient last month noted to have recurrent episodes of hypotension requiring his PCP to hold off, Magnesium was low; received hydration, blood transfusion and Magnesium through oncology infusion center. He developed cough, dyspnea, fatigue abt 3 weeks ago. He was taken to [facility] and dx with COVID19 infection and pneumonia per GD. There were few family members with [address] but no direct contact with patient per GD. After ER visit he was treated with steroid, antibiotics w/o improvement and was brought to [facility] ER. In ER, patient noted to be hypotensive, hypothermic. Labs showed elevated lactic acid requiring to start fluid, antibiotics and sepsis work up. He required Levophed support and admitted to ICU. 11/07/22:   Chart reviewed; discussed during signout.   Patient seen in ICU; he is on ventilator support sedated; intubated 11/6/2022 a.m. due to respiratory failure. Thin respiratory secretions reported; no bloody secretions from ET tube. Hemoglobin 6.7; no hematemesis or rectal bleeding. Telemetry-sinus rhythm  Blood pressure-needing pressor. Mildly hypothermic - needing warm blanket. UOP-poor - 45 ml overnight. Drips- bicarb, midazolam 8mg/hr, levophed 10 mcg/min      Review of Systems:  Review of systems not obtained due to patient factors. No Known Allergies   Past Medical History:   Diagnosis Date    Endocrine disease     Hypertension       History reviewed. No pertinent surgical history. Social History     Tobacco Use    Smoking status: Not on file    Smokeless tobacco: Not on file   Substance Use Topics    Alcohol use: Not on file      History reviewed. No pertinent family history. Prior to Admission medications    Medication Sig Start Date End Date Taking? Authorizing Provider   simvastatin (ZOCOR) 20 mg tablet Take  by mouth nightly. Ibrahima Langston MD   lisinopril (PRINIVIL, ZESTRIL) 20 mg tablet Take  by mouth daily. Ibrahima Langston MD   aspirin delayed-release 81 mg tablet Take  by mouth daily. Ibrahima Langston MD   metFORMIN (GLUCOPHAGE) 1,000 mg tablet Take 1,000 mg by mouth two (2) times daily (with meals).     Ibrahima Langston MD     Current Facility-Administered Medications   Medication Dose Route Frequency    midazolam in normal saline (VERSED) 1 mg/mL infusion  0-10 mg/hr IntraVENous TITRATE    NOREPINephrine (LEVOPHED) 8 mg in 5% dextrose 250mL (32 mcg/mL) infusion  0.5-30 mcg/min IntraVENous TITRATE    chlorhexidine (PERIDEX) 0.12 % mouthwash 10 mL  10 mL Oral Q12H    levoFLOXacin (LEVAQUIN) 500 mg in D5W IVPB  500 mg IntraVENous Q48H    ferrous sulfate tablet 325 mg  1 Tablet Oral DAILY WITH BREAKFAST    insulin lispro (HUMALOG) injection   SubCUTAneous AC&HS    insulin glargine (LANTUS) injection 12 Units  0.2 Units/kg SubCUTAneous QHS    dexamethasone (DECADRON) 4 mg/mL injection 6 mg  6 mg IntraVENous Q24H    pantoprazole (PROTONIX) 40 mg in 0.9% sodium chloride 10 mL injection  40 mg IntraVENous Q12H    [Held by provider] heparin (porcine) injection 5,000 Units  5,000 Units SubCUTAneous Q8H         Objective:   Vital Signs:    Visit Vitals  /67   Pulse 90   Temp (!) 96.3 °F (35.7 °C)   Resp 20   Ht 5' 7\" (1.702 m)   Wt 65.1 kg (143 lb 8.3 oz)   SpO2 98%   BMI 22.48 kg/m²       O2 Device: Endotracheal tube, Ventilator   O2 Flow Rate (L/min): 50 l/min   Temp (24hrs), Av.4 °F (36.9 °C), Min:96.3 °F (35.7 °C), Max:99.3 °F (37.4 °C)       Intake/Output:   Last shift:      No intake/output data recorded.     Last 3 shifts:  1901 -  0700  In: 3688.6 [I.V.:3688.6]  Out: 475 [Urine:475]      Intake/Output Summary (Last 24 hours) at 2022 0908  Last data filed at 2022 0400  Gross per 24 hour   Intake 3427.75 ml   Output 125 ml   Net 3302.75 ml         Last 3 Recorded Weights in this Encounter    22 1435 22 0910 22 0400   Weight: 62 kg (136 lb 11 oz) 61.2 kg (135 lb) 65.1 kg (143 lb 8.3 oz)       Recent Labs     22  0443 22  0559 22  0543   PHI 7.50* 7.31* 7.19*   PCO2I 28.0* 28.0* 15.0*   PO2I 199* 87 83   HCO3I 22.0 14.3* 5.7*   FIO2I 75 100 100         Physical Exam: Limitation in exam due to PPE requirements for Covid isolation      Intubated and sedated; acyanotic  HEENT: pupils not dilated, reactive, no scleral jaundice, moist oral mucosa, no nasal drainage; neck supple  Neck: No adenopathy or thyroid swelling  Orotracheal and orogastric tubes-no bleeding or secretions  CVS: S1S2 no murmurs; JVD not elevated; telemetry-sinus rhythm  RS: [place] air entry bilaterally, decreased BS at bases, no wheezes, + bilateral crackles; not tachypneic or in distress  Abd: soft, non tender, no hepatosplenomegaly, no abd distension, no guarding or rigidity, bowel sounds heard  Neuro: Intubated and sedated; limited exam   Extrm: Mild bilateral leg edema   Skin: no rash  Lymphatic: no cervical or supraclavicular adenopathy  Vasc: DPs palpable       Data:       Recent Results (from the past 24 hour(s))   LACTIC ACID    Collection Time: 11/06/22 10:28 AM   Result Value Ref Range    Lactic acid 6.2 (HH) 0.4 - 2.0 MMOL/L   POTASSIUM    Collection Time: 11/06/22 10:28 AM   Result Value Ref Range    Potassium 5.3 3.5 - 5.5 mmol/L   GLUCOSE, POC    Collection Time: 11/06/22 11:18 AM   Result Value Ref Range    Glucose (POC) 195 (H) 70 - 110 mg/dL   GLUCOSE, POC    Collection Time: 11/06/22  5:19 PM   Result Value Ref Range    Glucose (POC) 332 (H) 70 - 110 mg/dL   CALCIUM, IONIZED    Collection Time: 11/06/22  6:43 PM   Result Value Ref Range    Ionized Calcium 1.05 (L) 1.12 - 1.32 MMOL/L   LACTIC ACID    Collection Time: 11/06/22  6:44 PM   Result Value Ref Range    Lactic acid 4.0 (HH) 0.4 - 2.0 MMOL/L   GLUCOSE, POC    Collection Time: 11/06/22  9:32 PM   Result Value Ref Range    Glucose (POC) 310 (H) 70 - 110 mg/dL   LACTIC ACID    Collection Time: 11/07/22  1:20 AM   Result Value Ref Range    Lactic acid 3.1 (HH) 0.4 - 2.0 MMOL/L   MAGNESIUM    Collection Time: 11/07/22  1:20 AM   Result Value Ref Range    Magnesium 2.2 1.6 - 2.6 mg/dL   CBC WITH AUTOMATED DIFF    Collection Time: 11/07/22  1:20 AM   Result Value Ref Range    WBC 13.7 (H) 4.6 - 13.2 K/uL    RBC 2.34 (L) 4.35 - 5.65 M/uL    HGB 6.7 (L) 13.0 - 16.0 g/dL    HCT 19.3 (L) 36.0 - 48.0 %    MCV 82.5 78.0 - 100.0 FL    MCH 28.6 24.0 - 34.0 PG    MCHC 34.7 31.0 - 37.0 g/dL    RDW 27.0 (H) 11.6 - 14.5 %    PLATELET 48 (L) 372 - 420 K/uL    NRBC 41.0 (H) 0  WBC    ABSOLUTE NRBC 5.62 (H) 0.00 - 0.01 K/uL    NEUTROPHILS 87 (H) 40 - 73 %    BAND NEUTROPHILS 4 0 - 5 %    LYMPHOCYTES 4 (L) 21 - 52 %    MONOCYTES 3 3 - 10 %    EOSINOPHILS 0 0 - 5 %    BASOPHILS 0 0 - 2 %    METAMYELOCYTES 2 (H) 0 %    IMMATURE GRANULOCYTES 0 %    ABS.  NEUTROPHILS 12.5 (H) 1.8 - 8.0 K/UL ABS. LYMPHOCYTES 0.5 (L) 0.9 - 3.6 K/UL    ABS. MONOCYTES 0.4 0.05 - 1.2 K/UL    ABS. EOSINOPHILS 0.0 0.0 - 0.4 K/UL    ABS. BASOPHILS 0.0 0.0 - 0.1 K/UL    ABS. IMM. GRANS. 0.0 K/UL    DF MANUAL      PLATELET COMMENTS DECREASED PLATELETS      RBC COMMENTS ANISOCYTOSIS  2+        RBC COMMENTS POIKILOCYTOSIS  2+        RBC COMMENTS SCHISTOCYTES  1+        RBC COMMENTS NICHO CELLS  2+        RBC COMMENTS ACANTHOCYTES  1+       METABOLIC PANEL, COMPREHENSIVE    Collection Time: 11/07/22  1:20 AM   Result Value Ref Range    Sodium 144 136 - 145 mmol/L    Potassium 4.7 3.5 - 5.5 mmol/L    Chloride 112 (H) 100 - 111 mmol/L    CO2 23 21 - 32 mmol/L    Anion gap 9 3.0 - 18 mmol/L    Glucose 262 (H) 74 - 99 mg/dL    BUN 82 (H) 7.0 - 18 MG/DL    Creatinine 3.42 (H) 0.6 - 1.3 MG/DL    BUN/Creatinine ratio 24 (H) 12 - 20      eGFR 17 (L) >60 ml/min/1.73m2    Calcium 7.1 (L) 8.5 - 10.1 MG/DL    Bilirubin, total 1.6 (H) 0.2 - 1.0 MG/DL    ALT (SGPT) 35 16 - 61 U/L    AST (SGOT) 74 (H) 10 - 38 U/L    Alk. phosphatase 175 (H) 45 - 117 U/L    Protein, total 5.5 (L) 6.4 - 8.2 g/dL    Albumin 1.3 (L) 3.4 - 5.0 g/dL    Globulin 4.2 (H) 2.0 - 4.0 g/dL    A-G Ratio 0.3 (L) 0.8 - 1.7     CALCIUM, IONIZED    Collection Time: 11/07/22  1:20 AM   Result Value Ref Range    Ionized Calcium 1.04 (L) 1.12 - 1.32 MMOL/L   PHOSPHORUS    Collection Time: 11/07/22  1:20 AM   Result Value Ref Range    Phosphorus 4.8 2.5 - 4.9 MG/DL   BLOOD GAS, ARTERIAL POC    Collection Time: 11/07/22  4:43 AM   Result Value Ref Range    Device: ADULT VENT      FIO2 (POC) 75 %    pH (POC) 7.50 (H) 7.35 - 7.45      pCO2 (POC) 28.0 (L) 35.0 - 45.0 MMHG    pO2 (POC) 199 (H) 80 - 100 MMHG    HCO3 (POC) 22.0 22 - 26 MMOL/L    sO2 (POC) 99.8 (H) 92 - 97 %    Base deficit (POC) 1.1 mmol/L    Mode ASSIST CONTROL      Tidal volume 450 ml    Set Rate 24 bpm    PEEP/CPAP (POC) 5 cmH2O    PIP (POC) 22      Allens test (POC) Positive      Site LEFT RADIAL      Patient temp.  Allika 46 Specimen type (POC) ARTERIAL      Performed by Darlene Angulo    GLUCOSE, POC    Collection Time: 11/07/22  4:49 AM   Result Value Ref Range    Glucose (POC) 232 (H) 70 - 110 mg/dL   LACTIC ACID    Collection Time: 11/07/22  7:45 AM   Result Value Ref Range    Lactic acid 3.1 (HH) 0.4 - 2.0 MMOL/L   GLUCOSE, POC    Collection Time: 11/07/22  9:05 AM   Result Value Ref Range    Glucose (POC) 220 (H) 70 - 110 mg/dL         Chemistry Recent Labs     11/07/22  0120 11/06/22  1028 11/06/22  0514 11/05/22  0535   *  --  83 194*     --  146* 146*   K 4.7 5.3 5.6* 4.6   *  --  123* 124*   CO2 23  --  8* 13*   BUN 82*  --  61* 42*   CREA 3.42*  --  2.58* 1.95*   CA 7.1*  --  8.0* 7.7*   MG 2.2  --  2.4 2.1   PHOS 4.8  --  5.4* 2.8   AGAP 9  --  15 9   BUCR 24*  --  24* 22*   *  --  250* 171*   TP 5.5*  --  6.8 5.8*   ALB 1.3*  --  1.5* 1.3*   GLOB 4.2*  --  5.3* 4.5*   AGRAT 0.3*  --  0.3* 0.3*          Lactic Acid Lactic acid   Date Value Ref Range Status   11/07/2022 3.1 (HH) 0.4 - 2.0 MMOL/L Final     Comment:     CALLED TO AND CORRECTLY REPEATED BY:  RAMANA ALVAREZ RN ICU TO [address] AT 1589 ON 11/7/22. Recent Labs     11/07/22  0745 11/07/22  0120 11/06/22  1844   LAC 3.1* 3.1* 4.0*          Liver Enzymes Protein, total   Date Value Ref Range Status   11/07/2022 5.5 (L) 6.4 - 8.2 g/dL Final     Albumin   Date Value Ref Range Status   11/07/2022 1.3 (L) 3.4 - 5.0 g/dL Final     Globulin   Date Value Ref Range Status   11/07/2022 4.2 (H) 2.0 - 4.0 g/dL Final     A-G Ratio   Date Value Ref Range Status   11/07/2022 0.3 (L) 0.8 - 1.7   Final     Alk.  phosphatase   Date Value Ref Range Status   11/07/2022 175 (H) 45 - 117 U/L Final     Recent Labs     11/07/22  0120 11/06/22  0514 11/05/22  0535   TP 5.5* 6.8 5.8*   ALB 1.3* 1.5* 1.3*   GLOB 4.2* 5.3* 4.5*   AGRAT 0.3* 0.3* 0.3*   * 250* 171*          CBC w/Diff Recent Labs     11/07/22  0120 11/06/22  0514 11/05/22  0535   WBC 13.7* 13.0 8.8 RBC 2.34* 2.93* 2.57*   HGB 6.7* 8.5* 7.8*   HCT 19.3* 25.7* 23.0*   PLT 48* 49* 55*   GRANS 87* 83* 86*   LYMPH 4* 1* 4*   EOS 0 0 0          Cardiac Enzymes No results found for: CPK, CK, CKMMB, CKMB, RCK3, CKMBT, CKNDX, CKND1, KIMBERLY, TROPT, TROIQ, BLANCO, TROPT, TNIPOC, BNP, BNPP     BNP No results found for: BNP, BNPP, XBNPT     Coagulation No results for input(s): PTP, INR, APTT, INREXT, INREXT in the last 72 hours. Thyroid  No results found for: T4, T3U, TSH, TSHEXT, TSHEXT    No results found for: T4     Urinalysis Lab Results   Component Value Date/Time    Color YELLOW 11/02/2022 02:40 PM    Appearance CLEAR 11/02/2022 02:40 PM    Specific gravity 1.020 11/02/2022 02:40 PM    pH (UA) 5.5 11/02/2022 02:40 PM    Protein 100 (A) 11/02/2022 02:40 PM    Glucose 250 (A) 11/02/2022 02:40 PM    Ketone Negative 11/02/2022 02:40 PM    Bilirubin Negative 11/02/2022 02:40 PM    Urobilinogen 0.2 11/02/2022 02:40 PM    Nitrites Negative 11/02/2022 02:40 PM    Leukocyte Esterase Negative 11/02/2022 02:40 PM    Epithelial cells 1+ 11/02/2022 02:40 PM    Bacteria FEW (A) 11/02/2022 02:40 PM    WBC 0 to 3 11/02/2022 02:40 PM    RBC 0 to 3 11/02/2022 02:40 PM          Culture data during this hospitalization.    All Micro Results       Procedure Component Value Units Date/Time    CULTURE, BLOOD [[id number]] Collected: 11/02/22 1107    Order Status: Completed Specimen: Blood Updated: 11/07/22 0755     Special Requests: NO SPECIAL REQUESTS        Culture result: NO GROWTH 5 DAYS       CULTURE, BLOOD [[id number]] Collected: 11/02/22 1107    Order Status: Completed Specimen: Blood Updated: 11/07/22 6585     Special Requests: NO SPECIAL REQUESTS        Culture result: NO GROWTH 5 DAYS       CULTURE, RESPIRATORY/SPUTUM/BRONCH [place] STAIN [[id number]] Collected: 11/03/22 1939    Order Status: Completed Specimen: Sputum Updated: 11/06/22 1310     Special Requests: NO SPECIAL REQUESTS        GRAM STAIN FEW WBCS SEEN OCCASIONAL GRAM POSITIVE COCCI IN CLUSTERS           Culture result:       LIGHT NORMAL RESPIRATORY GAEL          CULTURE, URINE [[id number]] Collected: 11/02/22 1440    Order Status: Completed Specimen: Cath Urine Updated: 11/04/22 1116     Special Requests: NO SPECIAL REQUESTS        Culture result: No growth (<1,000 CFU/ML)       LEGIONELLA PNEUMOPHILA AG, URINE [[id number]] Collected: 11/03/22 1700    Order Status: Completed Specimen: Urine, random Updated: 11/03/22 2120     Legionella Ag, urine Negative       STREP PNEUMO AG, URINE [[id number]] Collected: 11/03/22 1700    Order Status: Completed Specimen: Urine, random Updated: 11/03/22 2120     Strep pneumo Ag, urine Negative       COVID-19 WITH INFLUENZA A/B [[id number]]  (Abnormal) Collected: 11/02/22 1346    Order Status: Completed Specimen: Nasopharyngeal Updated: 11/02/22 1448     SARS-CoV-2 by PCR Detected        Comment: Positive results are indicative of the presence of SARS-CoV-2. Clinical correlation with patient history and other diagnostic information is necessary to determine patient infection status. Positive results do not rule out bacterial infection or co-infection with other pathogens. CALLED TO AND CORRECTLY REPEATED BY:  ANJEL CALLAHAN ED 11/02/22 AT 1448 BY FOREST          Influenza A by PCR Not detected        Influenza B by PCR Not detected        Comment: NOTE: Influenza A and Influenza B negative results should be considered presumptive in samples that have a positive SARS-CoV-2 result. Consider re-testing with an alternate FDA-approved test if clinically indicated. Testing was performed using gracy [employer]ize SARS-CoV-2 and Influenza A/B nucleic acid assay.   This test is a multiplex Real-Time Reverse Transcriptase Polymerase Chain Reaction (RT-PCR) based in vitro diagnostic test intended for the qualitative detection of nucleic acids from SARS-CoV-2, Influenza A, and Influenza B in nasopharyngeal for use under the FDA's Emergency Use Authorization(EAU) only. Fact sheet for Patients: FindDrives.pl  Fact sheet for Healthcare Providers: FindDrives.pl         COVID-19 RAPID TEST [[id number]] Collected: 11/02/22 1430    Order Status: Canceled                Chest Images report reviewed by me:    NM LUNG SCAN PERF  Result Date: 11/3/2022  EXAM: NUCLEAR MEDICINE LUNG PERFUSION SCAN CLINICAL INDICATION/HISTORY: PE. -Additional:  Shortness of breath. COMPARISON: Correlation is made with radiographs from the same day. TECHNIQUE: 6.0 mCi Tc-99m MAA was injected intravenously anterior, posterior, and bilateral oblique planar images were acquired. _______________ FINDINGS: No significant wedge-shaped or segmental appearing perfusion defects identified to suggest the presence of pulmonary embolism. _______________   No suspicious perfusion defects, PE absent category. CT CHEST ABD PELV WO CONT  Result Date: 11/2/2022  FINDINGS: CHEST: MEDIASTINUM: Heart size is normal. Small pericardial effusion. Normal caliber aorta with vascular calcifications LYMPH NODES: No pathologically enlarged mediastinal or hilar lymph nodes. PLEURA: No pleural effusion or pneumothorax. LUNGS/AIRWAY: Extensive bilateral parenchymal groundglass locations with angulation. No evidence of honeycombing. OTHER: None. ** ABDOMEN/PELVIS: Lack of intravenous contrast limits evaluation of abdominal viscera. LIVER, BILIARY: Liver is unremarkable. No abnormal biliary dilation. Gallbladder is unremarkable. PANCREAS: Unremarkable. SPLEEN: Unremarkable. ADRENALS: Unremarkable. KIDNEYS: No hydronephrosis or renal calcification. Oliveira catheter within the urinary bladder. LYMPH NODES: No pathologically enlarged lymph nodes. GASTROINTESTINAL TRACT: Prior right colectomy with right ileocolic anastomosis. No abnormal bowel dilation or wall thickening. PELVIC ORGANS: Unremarkable. VASCULATURE: Vascular calcifications.  OSSEOUS: Numerous sclerotic foci throughout the axial skeleton possibly representing bone islands. No area of erosion or aggressive-appearing bone destruction. OTHER: Trace ascites. IMPRESSION  Extensive groundglass opacities throughout the lungs, may reflect atypical infection or edema superimposed on or as pneumonia. No acute intra-abdominal abnormality. Trace ascites. ECHO ADULT COMPLETE  Result Date: 11/3/2022    Left Ventricle: Normal left ventricular systolic function with a visually estimated EF of 55 - 60%. Left ventricle is smaller than normal. Normal wall thickness. Normal wall motion. Grade I diastolic dysfunction present with normal LV EF. Tricuspid Valve: Valve structure is normal. No regurgitation. No stenosis noted. Unable to assess RVSP due to inadequate or insignificant tricuspid regurgitation       DUPLEX LOWER EXT VENOUS BILAT  Result Date: 11/2/2022  · No evidence of deep vein thrombosis in the right lower extremity. · No evidence of deep vein thrombosis in the left lower extremity. CXR reviewed by me:  XR 11/6/2022 Results from Hospital Encounter encounter on 11/02/22    XR ABD (KUB)    Narrative  CLINICAL HISTORY: GI tube placement. COMPARISONS: Plain radiograph, earlier today. TECHNIQUE: Supine view OF THE ABDOMEN    --------------------------------------------------------    FINDINGS:    NG/OG tube has been advanced in the interval, its tip now at the GE junction. Recommend further advancement of at least 10 cm. SOFT TISSUES:  Increased air within small bowel, not significantly dilated,  potentially administered during tube placement. Pulmonary interstitial process,  not appreciably changed. BONES:  No fracture or suspicious bone lesion.      --------------------------------------------------------    Impression  1. GI tube location, as described.          CXR 11/7/2022  CLINICAL HISTORY: Acute hypoxic respiratory failure, COVID19 pneumonia, ET tube  position   COMPARISON: None  FINDINGS:  Frontal view of the chest demonstrate patchy bilateral airspace disease, stable. Right approach Mediport tip in the SVC. ET tube tip approximately 3 cm from the  subha. Enteric tube tip in stomach. . Cardiac silhouette is normal in size and  contour. No acute bony or soft tissue abnormality. IMPRESSION  Lines/support tubes in place as described. Stable bilateral nonspecific airspace  disease. Please note: Voice-recognition software may have been used to generate this report, which may have resulted in some phonetic-based errors in grammar and contents. Even though attempts were made to correct all the mistakes, some may have been missed, and remained in the body of the document. Dragon software not working well since recent [employer], and care taken as much as possible to correct mistakes.       Priscilla Denton MD  11/7/2022

## 2022-11-07 NOTE — PALLIATIVE CARE
Chart reviewed, noted pt required emergent intubation on 11/6/22, family meeting planned with palliative care today, cm will cont to review for medical updates for assistance with discharge planning pending  medical prognosis.

## 2022-11-08 LAB
ABO + RH BLD: NORMAL
BLD PROD TYP BPU: NORMAL
BLOOD GROUP ANTIBODIES SERPL: NORMAL
BPU ID: NORMAL
CALLED TO:,BCALL1: NORMAL
CROSSMATCH RESULT,%XM: NORMAL
SPECIMEN EXP DATE BLD: NORMAL
STATUS OF UNIT,%ST: NORMAL
UNIT DIVISION, %UDIV: 0

## 2022-11-08 NOTE — PROGRESS NOTES
Called to examine patient who has . No response to verbal and tactile stimuli. No respiratory effort. Absent heart sounds and pulses. Pupils fixed and dilated. Patient pronounced dead at 2007 PM hours.      Tk Pro MD   Hospitalist, Internal Medicine

## 2022-11-08 NOTE — PROGRESS NOTES
Assumed care of patient from [address] (off going nurse). Patient  placed on comfort measures. Family at bedside. Call bell within reach. Bed in lowest, locked position. 2007- Patient asystole on monitor. Patient assessed. Chest auscultated x 1 min. No heartbeat heard. MD paged. 2100- [facility] notified of time of death, patient not candidate for tissue donation. 2140- Family no longer at bedside. Post-mortem care provided.

## 2022-11-08 NOTE — PROGRESS NOTES
11/07/22 1910   Ventilator Initiate/Discontinue   Ventilator Discontinue Yes   Patient extubated and on comfort measures.

## 2022-11-08 NOTE — DISCHARGE SUMMARY
Death Summary     Patient: Ale Loaiza       MRN: [id number]       YOB: 1940       Age: [de-identified] y.o. Date of admission:  11/2/2022    Date of death:  11/7/2022    Primary care provider:  Ibrahima Langston MD     Admitting provider:  Nancy Conner MD    Discharging provider:  Matthew Garcia MD     Consultations  IP CONSULT TO HOSPITALIST  IP CONSULT TO INTENSIVIST  IP CONSULT TO HEMATOLOGY  IP CONSULT TO PALLIATIVE CARE - PROVIDER  IP CONSULT TO INFECTIOUS DISEASES  IP CONSULT TO PALLIATIVE CARE - PROVIDER  IP CONSULT TO NEPHROLOGY    Procedures  Intubation 11/6/22    Admission diagnoses  Pneumonia [J18.9]  Septic shock ([facility] [A41.9, R65.21]  CLAUS (acute kidney injury) ([facility] [facility] [N17.9]  Hypothermia [T68. XXXA]  Hypokalemia [E87.6]    Please refer to the admission history and physical for details on the presenting problem. Final discharge diagnoses and brief hospital course  Acute hypoxic respiratory failure - J96.01  Septic shock - A41.9, R65.21  COVID19 pneumonia - U07.1, J12.82  Immunocompromised status - D84.9  Lactic acidosis - E87.20  CLAUS on CKD - N17.9  Hyperkalemia - E87.5  Leukopenia - D72.819  Anemia - D64.9  Thrombocytopenia - D69.6  DM - uncontrolled with hyperglycemia  Hypoalbuminemia  Severe protein calorie malnutrition    Patient Active Problem List   Diagnosis Code    Hypokalemia E87.6    Hypothermia T68. 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 Initially required HFNC. Patient intubated 11/6/2022 for worsening respiratory failure. CVS: Patient remains on pressor; Levophed-microgram per minute. EF-55 to 60%.   ID: COVID19 pneumonia  Dexamethasone-6 mg daily-complete 10 days.  Procalcitonin was elevated; LA 3.1. Antibiotic-levofloxacin. Cultures negative. Hematology/Oncology: Transfuse 1 unit PRBC; keep hemoglobin greater than 7 g/dL. No active bleeding issues. Chemotherapy on hold. Renal: Poor urine output; worsening renal function. Bicarb drip stopped due to metabolic alkalosis. GI: PPI for GI prophylaxis. NPO while on pressor. Endocrine: FSBS was monitored   Basal lantus insulin and SSI. Monitor BG. Neurology: Vent support, and currently sedated. CT head 8/27/2022 - no metastases reported. Family decided today to make pt comfort care and extubate patient. At 2007, bedside nurse found pt to have asystole on the monitor. Overnight hospitalist came to evaluate the patient. No response to verbal and tactile stimuli. No respiratory effort. Absent heart sounds and pulses. Pupils fixed and dilated. Patient pronounced dead at 2007 PM hours. Last vital signs recorded:  Visit Vitals  /72   Pulse (!) 0   Temp 97.3 °F (36.3 °C)   Resp (!) 0   Ht 5' 7\" (1.702 m)   Wt 65.1 kg (143 lb 8.3 oz)   SpO2 (!) 42%   BMI 22.48 kg/m²          Recent Labs     11/07/22  0120 11/06/22 0514 11/05/22  0535   WBC 13.7* 13.0 8.8   HGB 6.7* 8.5* 7.8*   HCT 19.3* 25.7* 23.0*   PLT 48* 49* 55*     Recent Labs     11/07/22  0120 11/06/22  1028 11/06/22  0514 11/05/22  0535     --  146* 146*   K 4.7 5.3 5.6* 4.6   *  --  123* 124*   CO2 23  --  8* 13*   BUN 82*  --  61* 42*   CREA 3.42*  --  2.58* 1.95*   *  --  83 194*   CA 7.1*  --  8.0* 7.7*   MG 2.2  --  2.4 2.1   PHOS 4.8  --  5.4* 2.8     Recent Labs     11/07/22  0120 11/06/22  0514 11/05/22  0535   * 250* 171*   TP 5.5* 6.8 5.8*   ALB 1.3* 1.5* 1.3*   GLOB 4.2* 5.3* 4.5*     No results for input(s): INR, PTP, APTT, INREXT in the last 72 hours. No results for input(s): FE, TIBC, PSAT, FERR in the last 72 hours. No results for input(s): PH, PCO2, PO2 in the last 72 hours.   No results for input(s): CPK, CKMB in the last 72 hours. 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 Glenda Hurry  ICD-9-CM: 991.6  11/2/2022 - Present        Pneumonia ICD-10-CM: J18.9  ICD-9-CM: 561  11/2/2022 - Present        CLAUS (acute kidney injury) ([facility] [facility] ICD-10-CM: N17.9  ICD-9-CM: 584.9  11/2/2022 - Present        Septic shock (HCC) ICD-10-CM: A41.9, R65.21  ICD-9-CM: 038.9, 785.52, 995.92  11/2/2022 - Present         Signed:  Anita Marlow MD                 Hospitalist                 11/7/2022                 9:22 PM        Cc:  Other, MD Ibrahima

## 2022-11-08 NOTE — ADT AUTH CERT NOTES
Pneumonia - Care Day 5 (11/6/2022) by Wilda Grace       Review Status Review Entered   Completed 11/8/2022 4072       Created By   Wilda Grace      Criteria Review      Care Day: 5 Care Date: 11/6/2022 Level of Care: ICU    Guideline Day 2    Level Of Care    ( ) Floor    11/8/2022 09:12:59 EST by Wilda Grace      11/6 ICU    ( ) Social Determinants of Health Assessment    11/8/2022 09:18:37 EST by Tñoa Morales      not indicated    (X) Readmission Risk Assessment    11/8/2022 09:18:37 EST by Wilda Grace      ED visits: 8/27/22; 10/23/22    ( ) Extended Stay Risk Assessment    11/8/2022 09:18:37 EST by Wilda Grace      not indicated    Clinical Status    ( ) * No CO2 retention or acidosis    11/8/2022 09:20:57 EST by Toña Weber      CO2: 8 (LL)  pCO2 : 15.0 (L),  28.0 (L)    ( ) * No requirement for mechanical ventilation    11/8/2022 09:18:37 EST by Toña Weber      vent FIO2 100%    ( ) * Hypotension absent    11/8/2022 09:18:37 EST by Toña Weber      BP: 53/31    (X) * Afebrile or fever improved    11/8/2022 09:18:37 EST by Toña Weber      T: 99.3    ( ) * No hypoxia on room air or oxygenation improved    11/8/2022 09:18:37 EST by Toña Weber      33% o2 sat    ( ) * Mental status improved or at baseline    11/8/2022 09:12:59 EST by Toña Weber      intubated and sedated    Activity    ( ) * Increased activity    11/8/2022 09:12:59 EST by Toña Weber      bedrest    Routes    ( ) Usual diet    11/8/2022 09:18:37 EST by Toña Weber      NPO    Interventions    (X) Incentive spirometry    (X) Head of bed at 30 degrees    (X) Possible oxygen    Medications    (X) IV or oral antibiotics    11/8/2022 09:22:06 EST by Toña Weber      iv levaquin 500 mg q48h    * Milestone   Additional Notes   11/6/22 LOC IP ICU      Pertinent Updates:   -0114- pt having increased work of breathing, course lung sounds in the bases, tachycardia and need for higher oxygen requirements. 0130- Pt placed on 50L HFNC 80% FiO2.     0523- Pt intubated at this time    -CULTURE, RESPIRATORY/SPUTUM/BRONCH W GRAM STAIN    FEW WBCS SEEN,OCCASIONAL GRAM POSITIVE COCCI IN CLUSTERS, LIGHT NORMAL RESPIRATORY GAEL      -Lactic acid: 6.4 (HH), 6.2 (HH), 4.0 (HH)      Vitals:   95.8 132 213/111 47 60% vent FIO2 100%   T: 97.8, 97.4   HR: 110,116,121,94,124,126,72,108   BP: 149/91, 197/94, 144/81,183/83,129/69,80/56,60/42,97/51   RR:32,34,41,29,42,36,35,40,45,39,4,31   O2 Sat%: 19,56,67,44,807,12,99,49,23,17      Abnl/Pertinent Labs/Radiology:   glucose: 61,83, 97, 195,332,310    NRBC: 11.6 (H)       RBC: 2.93 (L)          HGB: 8.5 (L)   HCT: 25.7 (L)           RDW: 26.1 (H)         PLATELET: 49 (L)   NEUTROPHILS: 83 (H)         LYMPHOCYTES: 1 (L)   MONOCYTES: 11 (H)            IMMATURE GRANULOCYTES: 4 (H)   ABSOLUTE NRBC: 1.50 (H)         ABS. NEUTROPHILS: 10.9 (H)   ABS. IMM. GRANS.: 0.5 (H)          ABS. LYMPHOCYTES: 0.1 (L)   ABS. MONOCYTES: 1.4 (H)          Sodium: 146 (H)   Potassium: 5.6 (H), 5.3         Chloride: 123 (H)       Creatinine: 2.58 (H)   BUN/Creatinine ratio: 24 (H)          Calcium: 8.0 (L)   Phosphorus: 5.4 (H)         eGFR: 24 (L)       Bilirubin, total: 2.5 (H)   Albumin: 1.5 (L)           Globulin: 5.3 (H)        A-G Ratio: 0.3 (L)   AST: 151 (H)        Alk. phosphatase: 250 (H)         pH (POC): 7.19 (LL), 7.31 (L)       HCO3 (POC): 5.7 (L), 14.3 (L)   Allens test (POC): Positive       Ionized Calcium: 1.08 (L), 1.05 (L)   -XR abdomen:   FINDINGS:NG/OG tube has been advanced in the interval, its tip now at the GE junction. Recommend further advancement of at least 10 cm. SOFT TISSUES:  Increased air within small bowel, not significantly dilated, potentially administered during tube placement. Pulmonary interstitial process, not appreciably changed. BONES:  No fracture or suspicious bone lesion.       -CXR:   IMPRESSION: Diffuse increased markings and infiltrates were present previously and are similar to prior. No new consolidation or evidence for significant pleural effusion. Physical Exam:   General:  intubated and sedated   CVS:Regular rhythm, tachycardia    Lungs:Clear    Abdomen: Soft, Nontender, No distention,Extremities: No C/C/E, pulses palpable 2+   Neuro:grossly normal    Psych:appropriate      MD Consults/Assessments & Plans:   Per Attending:   he is back on his pressors   intubation early this morning around 630   Recheck duplex    on Lantus and sliding scale insulin       Per Oncology:   - No plan for chemotherapy while inhouse   - Continue empiric antibiotics and BP support per ICU   - Transfuse to maintain Hg>7      Per Nephrology:   IV bicarb   Supportive care      Per Pulmonary:   Later on to getting tired from work of breathing and metabolic acidosis on ABG requiring intubation and ventilator support   Post intubation, patient developed hypotension    Urine output remains low   some exchange oral secretions noted during attempted NG tube placement earlier this a.m. by staff but no gross bleeding   Remains on PPI every 12 hours      Medications:    Iv calcium gluconate 2 gm given x1   Iv sodium bicarbonate 150 meq given x1   Iv decadron 6 mg q24h   Iv fentanyl citrate q4h prn 50 mcg and 100 mcg given x1   Sc lantus 12U bedtime   Sc humalog 2U given x1 8U given x2   Iv LR @50 mL/hr continuous-dcd   Iv versed drip titrate   Iv levophed drip titrate    Iv protonix 40 mg q12h   Iv Sodium bicarbonate 150 meq in D5 1L @100 mL/hr continuous      Orders:   strict I&O, neurologic status assessment, droplet plus and droplet isolation, glucose checks q6h, skin assessment, turn pt q2h, float heels, SCDs        Pneumonia - Care Day 4 (11/5/2022) by Abimael Mojica       Review Status Review Entered   Completed 11/8/2022 0853       Created By   Abimael Mojica      Criteria Review      Care Day: 4 Care Date: 11/5/2022 Level of Care: ICU    Guideline Day 2    Level Of Care    ( ) Floor    11/8/2022 08:47:41 EST by Tom Ma      11/5 ICU    ( ) Social Determinants of Health Assessment    11/8/2022 08:47:41 EST by Tom Ma      not indicated    (X) Readmission Risk Assessment    11/8/2022 08:47:41 EST by Tom Ma      ED visits: 8/27/22; 10/23/22    ( ) Extended Stay Risk Assessment    11/8/2022 08:47:41 EST by Tom Ma      not indicated    Clinical Status    ( ) * No CO2 retention or acidosis    11/8/2022 08:52:03 EST by Toña Weber      CO2: 13 (L)    (X) * No requirement for mechanical ventilation    11/8/2022 08:47:41 EST by Toña Weber      on nc    (X) * Hypotension absent    11/8/2022 08:51:06 EST by Toña Weber      BP: 184/89    (X) * Afebrile or fever improved    11/8/2022 08:51:06 EST by Toña Weber      T: 95.6    ( ) * No hypoxia on room air or oxygenation improved    11/8/2022 08:51:06 EST by Toña Weber      82% Non-rebreather mask 15L    (X) * Mental status improved or at baseline    11/8/2022 08:47:41 EST by Toña Weber      AAOx4    Activity    ( ) * Increased activity    11/8/2022 08:47:41 EST by Toña Weber      bedrest    Routes    (X) Oral medications    11/8/2022 08:53:03 EST by Toña Weber      po ferrous sulfate 325 mg daily    (X) Usual diet    11/8/2022 08:52:03 EST by Toña Weber      ADULT DIET Easy to Chew; 5 carb choices (75 gm/meal)    Interventions    (X) Incentive spirometry    (X) Head of bed at 30 degrees    (X) Possible oxygen    * Milestone   Additional Notes   11/5/22 LOC IP ICU      Pertinent Updates:   -2000- Placed warming blanket back on pt due to difficulty obtaining temperature and skin being cool to the touch. 2030-Rectal temperature of 95.6F. .. warming blanket remains in place.                  -PLATELET: 55 (L)      Vitals:   97.5 112 170/86 43 89% non rebreather mask 15L    T:  95.9,96.5   HR: 104,106,109,100,98,81,101   BP: 135/71,126/89,174/88,106/51,168/94,113/54,176/94,154/84   RR: 28,31,35,32,38,36,28   O2 Sat:95%, 91%, 83%      Abnl/Pertinent Labs/Radiology:   glucose: 194,161,233,256,185   Ionized Calcium: 1.18       NRBC: 3.1 (H)         RBC: 2.57 (L)   HGB: 7.8 (L)        HCT: 23.0 (L)        RDW: 20.7 (H)   NEUTROPHILS: 86 (H)        LYMPHOCYTES: 4 (L)   IMMATURE GRANULOCYTES: 2 (H)       ABSOLUTE NRBC: 0.27 (H)   ABS. IMM. GRANS.: 0.2 (H)        ABS. LYMPHOCYTES: 0.4 (L)   Sodium: 146 (H)      Chloride: 124 (H)      BUN: 42 (H)   Creatinine: 1.95 (H)         BUN/Creatinine ratio: 22 (H)     Calcium: 7.7 (L)       eGFR: 34 (L)         Protein, total: 5.8 (L)   Albumin: 1.3 (L)       Globulin: 4.5 (H)         A-G Ratio: 0.3 (L)   AST: 61 (H)       Alk. phosphatase: 171 (H)     Procalcitonin: 1.60   -CXR:   IMPRESSION: Interstitial lung process, improved from 11/3/2022, with differential including pulmonary edema and atypical infection. Physical Exam:   General: elderly AAM,  alert, NAD, OX4 fragile using accessory muscles   CVS:irregular rate and rhythm, no M/R/G, S1/S2 heard, no thrill   Lungs:Clear to auscultation bilaterally, no wheezes, rhonchi, or rales   Abdomen: Soft, Nontender, No distention, Normal Bowel sounds   Extremities: No C/C/E, pulses palpable 2+   Neuro:grossly normal , follows commands   Psych:appropriate      MD Consults/Assessments & Plans:   Per Attending:   Off of pressors but still fragile   Supportive care, isolation   Use SCD for DVT prophylaxis   Diabetes-Started on Lantus and sliding scale insulin    Hypophosphatemia-protocol repletion    Palliative care consult- full code   ICU care   Disposition : Although patient is off of high flow discussed with attending keeping in the ICU he seems very fragile and would likely be a rapid response and deteriorate quickly ICU attending in agreement      Per Pulmonary:   requires increased O2 flow up to 15 L/min   Reports stable cough without any active phlegm   Low appetite.   Low urine output      Per Oncology:   - No plan for chemotherapy while inhouse   - GCSF not indicated as not neutropenic   - Continue empiric antibiotics and BP support per ICU   - Transfuse to maintain Hg>7      Per Infectious Disease:   cont Steroid   cont Levofloxacin   BCX remain negative, resp culture with normal john. . Looking less like bacterial sepsis      Medications:    Iv decadron 6 mg q24h   Sc lantus 12U bedtime   Sc humalog 2U given x2, 4U and 6U given x1   Iv LR @50 mL/hr continuous   Iv protonix 40 mg q12h      Orders:   strict I&O, neurologic status assessment, droplet plus and droplet isolation, glucose checks q6h, skin assessment, turn pt q2h, float heels, SCDs        Pneumonia - Care Day 3 (11/4/2022) by Millicent Nunez       Review Status Review Entered   Completed 11/8/2022 0839       Created By   Millicent Nunez      Criteria Review      Care Day: 3 Care Date: 11/4/2022 Level of Care: ICU    Guideline Day 2    Level Of Care    ( ) Floor    11/8/2022 08:21:09 EST by Millicent Nunez      11/4 ICU    ( ) Social Determinants of Health Assessment    11/8/2022 08:21:09 EST by Millicent Nunez      not indicated    (X) Readmission Risk Assessment    11/8/2022 08:21:09 EST by Millicent Nunez      ED visits: 8/27/22; 10/23/22    ( ) Extended Stay Risk Assessment    11/8/2022 08:21:09 EST by Millicent Nunez      not indicated    Clinical Status    ( ) * No CO2 retention or acidosis    11/8/2022 08:26:39 EST by Toña Weber      CO2: 12 (L)    (X) * No requirement for mechanical ventilation    11/8/2022 08:21:09 EST by Toña Weber      on NC    (X) * Hypotension absent    11/8/2022 08:21:09 EST by Toña Weber      BP: 98/52    (X) * Afebrile or fever improved    11/8/2022 08:21:09 EST by Toña Weber      T: 97.8    ( ) * No hypoxia on room air or oxygenation improved    11/8/2022 08:21:09 EST by Toña Weber      92% 3L nc    (X) * Mental status improved or at baseline    11/8/2022 08:21:09 EST by Toña Weber      AAOx4    Activity    ( ) * Increased activity    11/8/2022 08:21:09 EST by Toña Weber      bedrest    Routes    (X) Oral medications    11/8/2022 08:26:39 EST by Toña Weber      po ferrous sulfate 325 mg daily    (X) Usual diet    11/8/2022 08:26:39 EST by Toña Weber      ADULT DIET Easy to Chew; 5 carb choices (75 gm/meal)    Interventions    (X) Incentive spirometry    (X) Head of bed at 30 degrees    (X) Possible oxygen    Medications    (X) IV or oral antibiotics    11/8/2022 08:26:39 EST by Toña Weber      iv levaquin 750 mg q48h,  iv zosyn 3.375 gm [id number]    * Milestone   Additional Notes   11/4/22 LOC IP ICU      Pertinent Updates:   -Urine Culture: No growth (<1,000 CFU/ML)   -Phosphorus: 1.5 (L), 2.8    PLATELET: 59 (L)      Vitals:   97.5 113 162/67 30 96% 3L nc    HR: 89,106,108,100   BP: 134/68,117/57,126/61   RR: 21,24,23,27      Abnl/Pertinent Labs:   glucose: 140,114,147,244,251,241   Ionized Calcium: 1.17        NRBC: 2.5 (H)       RBC: 2.72 (L)   HGB: 8.2 (L)         HCT: 23.8 (L)        RDW: 19.9 (H)   NEUTROPHILS: 83 (H)         LYMPHOCYTES: 5 (L)   IMMATURE GRANULOCYTES: 2 (H)      ABSOLUTE NRBC: 0.17 (H)   ABS. IMM. GRANS.: 0.1 (H)         ABS. LYMPHOCYTES: 0.3 (L)   Chloride: 121 (H)        CO2: 12 (L)        BUN: 28 (H)        Creatinine: 1.63 (H)         Calcium: 7.6 (L)       eGFR: 42 (L)   Bilirubin, total: 1.2 (H)        Protein, total: 5.8 (L)       Albumin: 1.3 (L)   Globulin: 4.5 (H)         A-G Ratio: 0.3 (L)        AST: 59 (H)   Alk.  phosphatase: 152 (H)      Physical Exam:   General: elderly AAM,  alert, NAD, OX4   CVS:irregular rate and rhythm, no M/R/G, S1/S2 heard, no thrill   Lungs:Clear to auscultation bilaterally, no wheezes, rhonchi, or rales   Abdomen: Soft, Nontender, No distention, Normal Bowel sounds   Extremities: No C/C/E, pulses palpable 2+   Neuro:grossly normal , follows commands   Psych:appropriate      MD Consults/Assessments & Plans:   Per Attending:   Weaning from pressors, down to one low dose this am   Eating breakfast   Supportive care   isolation   Use SCD for DVT prophylaxis   Diabetes-Started on Lantus and sliding scale insulin    Hypophosphatemia-protocol repletion   Palliative care consult- full code   ICU care       Per Pulmonary:   Keep SPO2 >=92%. On O2 via NC at 3 Lpm. Wean O2 flow as tolerated for goal SPO2   HOB 30 degree elevation all the time. Aggressive pulmonary toileting. Aspiration precautions. Incentive spirometry encouraged   Self proning when stable   Protecting airway    - Dexamethasone 6 mg daily   Antibiotics: Levofloxacin; Deescalate off of Zosyn   Sepsis bundle and protocol followed. Fluid resuscitation as needed. Follow cultures. Per Infectious Disease:   Stop vanco+ pip/tzb (hurts the renal function as well as platelets); doesn't help the COVID. Consider this infection his current booster -- take one of the bivalent Boosters in 2-3m. Medications:    Iv sodium phosphate 9 mmol given x1   Iv decadron 6 mg q24h   Sc lantus 12U bedtime   Sc humalog 6U and 4U given x1   Iv levophed drip titrate    Iv protonix 40 mg q12h      Orders:   strict I&O, neurologic status assessment, droplet plus and droplet isolation, glucose checks q6h, skin assessment, turn pt q2h, float heels, SCDs

## 2022-11-08 NOTE — PROGRESS NOTES
Bereavement Note:     responded to the death of Marianne Galaviz, who is a [de-identified] y.o., male, offering Spiritual Care to patient's family, see flow sheets for interventions. Date of Death: 2022    Extended Emergency Contact Information  Primary Emergency Contact: Nickolas Medrano Phone: [phone]  Mobile Phone: [phone]  Relation: Grandchild  Secondary Emergency Contact: Aaron pruitt  Mobile Phone: [phone]  Relation: Grandchild                 YES      NO  UNKNOWN  Life Net   []        [x]    []   [employer]   [] [x] []   Medical Examiner  []        [x]  []   Going to [employer]  []        [x] []      Autopsy   []        [x]         []   Sympathy Card  [x]        []  Bereavement Materials  [x]        []           Business Card Provided  [x]        []              Home: [address] will continue to follow family and will provide spiritual care as needed.   [address]   [phone] - Office

## 2022-11-09 LAB
BACTERIA SPEC CULT: NORMAL
BACTERIA SPEC CULT: NORMAL
SERVICE CMNT-IMP: NORMAL
SERVICE CMNT-IMP: NORMAL